# Patient Record
Sex: FEMALE | Race: BLACK OR AFRICAN AMERICAN | NOT HISPANIC OR LATINO | Employment: FULL TIME | URBAN - METROPOLITAN AREA
[De-identification: names, ages, dates, MRNs, and addresses within clinical notes are randomized per-mention and may not be internally consistent; named-entity substitution may affect disease eponyms.]

---

## 2023-02-23 ENCOUNTER — APPOINTMENT (OUTPATIENT)
Dept: CARDIOLOGY | Facility: CLINIC | Age: 28
DRG: 259 | End: 2023-02-23
Attending: EMERGENCY MEDICINE
Payer: COMMERCIAL

## 2023-02-23 ENCOUNTER — TELEPHONE (OUTPATIENT)
Dept: CARDIOLOGY | Facility: CLINIC | Age: 28
End: 2023-02-23
Payer: COMMERCIAL

## 2023-02-23 ENCOUNTER — HOSPITAL ENCOUNTER (INPATIENT)
Facility: CLINIC | Age: 28
LOS: 7 days | Discharge: HOME | DRG: 259 | End: 2023-03-02
Attending: EMERGENCY MEDICINE | Admitting: HOSPITALIST
Payer: COMMERCIAL

## 2023-02-23 ENCOUNTER — APPOINTMENT (OUTPATIENT)
Dept: GENERAL RADIOLOGY | Facility: CLINIC | Age: 28
DRG: 259 | End: 2023-02-23
Attending: EMERGENCY MEDICINE
Payer: COMMERCIAL

## 2023-02-23 DIAGNOSIS — Z95.0 PACEMAKER: Primary | ICD-10-CM

## 2023-02-23 DIAGNOSIS — D50.8 OTHER IRON DEFICIENCY ANEMIA: ICD-10-CM

## 2023-02-23 DIAGNOSIS — I44.2 COMPLETE HEART BLOCK (H): ICD-10-CM

## 2023-02-23 DIAGNOSIS — I48.92 ATRIAL FLUTTER, UNSPECIFIED TYPE (H): ICD-10-CM

## 2023-02-23 DIAGNOSIS — Z95.2 HISTORY OF TRICUSPID VALVE REPLACEMENT WITH MECHANICAL VALVE: Primary | ICD-10-CM

## 2023-02-23 DIAGNOSIS — Z95.0 CARDIAC PACEMAKER IN SITU: ICD-10-CM

## 2023-02-23 DIAGNOSIS — I51.3 MURAL THROMBUS OF HEART: ICD-10-CM

## 2023-02-23 LAB
ANION GAP SERPL CALCULATED.3IONS-SCNC: 12 MMOL/L (ref 7–15)
ATRIAL RATE - MUSE: 234 BPM
ATRIAL RATE - MUSE: 241 BPM
BASOPHILS # BLD AUTO: 0 10E3/UL (ref 0–0.2)
BASOPHILS NFR BLD AUTO: 0 %
BUN SERPL-MCNC: 14.2 MG/DL (ref 6–20)
CALCIUM SERPL-MCNC: 9.4 MG/DL (ref 8.6–10)
CHLORIDE SERPL-SCNC: 103 MMOL/L (ref 98–107)
CREAT SERPL-MCNC: 0.65 MG/DL (ref 0.51–0.95)
DEPRECATED HCO3 PLAS-SCNC: 23 MMOL/L (ref 22–29)
DIASTOLIC BLOOD PRESSURE - MUSE: NORMAL MMHG
DIASTOLIC BLOOD PRESSURE - MUSE: NORMAL MMHG
EOSINOPHIL # BLD AUTO: 0 10E3/UL (ref 0–0.7)
EOSINOPHIL NFR BLD AUTO: 1 %
ERYTHROCYTE [DISTWIDTH] IN BLOOD BY AUTOMATED COUNT: 17.6 % (ref 10–15)
ERYTHROCYTE [DISTWIDTH] IN BLOOD BY AUTOMATED COUNT: 17.7 % (ref 10–15)
GFR SERPL CREATININE-BSD FRML MDRD: >90 ML/MIN/1.73M2
GLUCOSE SERPL-MCNC: 82 MG/DL (ref 70–99)
HCT VFR BLD AUTO: 30 % (ref 35–47)
HCT VFR BLD AUTO: 34.3 % (ref 35–47)
HGB BLD-MCNC: 8.9 G/DL (ref 11.7–15.7)
HGB BLD-MCNC: 9.7 G/DL (ref 11.7–15.7)
HOLD SPECIMEN: NORMAL
IMM GRANULOCYTES # BLD: 0 10E3/UL
IMM GRANULOCYTES NFR BLD: 0 %
INR PPP: 2.17 (ref 0.85–1.15)
INTERPRETATION ECG - MUSE: NORMAL
INTERPRETATION ECG - MUSE: NORMAL
LVEF ECHO: NORMAL
LYMPHOCYTES # BLD AUTO: 1 10E3/UL (ref 0.8–5.3)
LYMPHOCYTES NFR BLD AUTO: 31 %
MCH RBC QN AUTO: 21.9 PG (ref 26.5–33)
MCH RBC QN AUTO: 22.5 PG (ref 26.5–33)
MCHC RBC AUTO-ENTMCNC: 28.3 G/DL (ref 31.5–36.5)
MCHC RBC AUTO-ENTMCNC: 29.7 G/DL (ref 31.5–36.5)
MCV RBC AUTO: 76 FL (ref 78–100)
MCV RBC AUTO: 77 FL (ref 78–100)
MONOCYTES # BLD AUTO: 0.2 10E3/UL (ref 0–1.3)
MONOCYTES NFR BLD AUTO: 5 %
NEUTROPHILS # BLD AUTO: 2.1 10E3/UL (ref 1.6–8.3)
NEUTROPHILS NFR BLD AUTO: 63 %
NRBC # BLD AUTO: 0 10E3/UL
NRBC BLD AUTO-RTO: 0 /100
NT-PROBNP SERPL-MCNC: 127 PG/ML (ref 0–450)
P AXIS - MUSE: -77 DEGREES
P AXIS - MUSE: -84 DEGREES
PLATELET # BLD AUTO: 179 10E3/UL (ref 150–450)
PLATELET # BLD AUTO: 231 10E3/UL (ref 150–450)
POTASSIUM SERPL-SCNC: 4 MMOL/L (ref 3.4–5.3)
PR INTERVAL - MUSE: 100 MS
PR INTERVAL - MUSE: NORMAL MS
QRS DURATION - MUSE: 4 MS
QRS DURATION - MUSE: 4 MS
QT - MUSE: 140 MS
QT - MUSE: 176 MS
QTC - MUSE: 238 MS
QTC - MUSE: 292 MS
R AXIS - MUSE: 0 DEGREES
R AXIS - MUSE: 0 DEGREES
RBC # BLD AUTO: 3.96 10E6/UL (ref 3.8–5.2)
RBC # BLD AUTO: 4.43 10E6/UL (ref 3.8–5.2)
SODIUM SERPL-SCNC: 138 MMOL/L (ref 136–145)
SYSTOLIC BLOOD PRESSURE - MUSE: NORMAL MMHG
SYSTOLIC BLOOD PRESSURE - MUSE: NORMAL MMHG
T AXIS - MUSE: 31 DEGREES
T AXIS - MUSE: 74 DEGREES
TROPONIN T SERPL HS-MCNC: <6 NG/L
VENTRICULAR RATE- MUSE: 166 BPM
VENTRICULAR RATE- MUSE: 174 BPM
WBC # BLD AUTO: 3.3 10E3/UL (ref 4–11)
WBC # BLD AUTO: 5.2 10E3/UL (ref 4–11)

## 2023-02-23 PROCEDURE — 85025 COMPLETE CBC W/AUTO DIFF WBC: CPT | Performed by: EMERGENCY MEDICINE

## 2023-02-23 PROCEDURE — 71046 X-RAY EXAM CHEST 2 VIEWS: CPT

## 2023-02-23 PROCEDURE — 250N000011 HC RX IP 250 OP 636: Performed by: EMERGENCY MEDICINE

## 2023-02-23 PROCEDURE — 210N000002 HC R&B HEART CARE

## 2023-02-23 PROCEDURE — 36415 COLL VENOUS BLD VENIPUNCTURE: CPT | Performed by: EMERGENCY MEDICINE

## 2023-02-23 PROCEDURE — 85610 PROTHROMBIN TIME: CPT | Performed by: EMERGENCY MEDICINE

## 2023-02-23 PROCEDURE — 99223 1ST HOSP IP/OBS HIGH 75: CPT | Mod: AI | Performed by: HOSPITALIST

## 2023-02-23 PROCEDURE — 250N000011 HC RX IP 250 OP 636: Performed by: HOSPITALIST

## 2023-02-23 PROCEDURE — 93005 ELECTROCARDIOGRAM TRACING: CPT

## 2023-02-23 PROCEDURE — 93306 TTE W/DOPPLER COMPLETE: CPT | Mod: 26 | Performed by: INTERNAL MEDICINE

## 2023-02-23 PROCEDURE — 83880 ASSAY OF NATRIURETIC PEPTIDE: CPT | Performed by: EMERGENCY MEDICINE

## 2023-02-23 PROCEDURE — 36415 COLL VENOUS BLD VENIPUNCTURE: CPT | Performed by: HOSPITALIST

## 2023-02-23 PROCEDURE — 84484 ASSAY OF TROPONIN QUANT: CPT | Performed by: EMERGENCY MEDICINE

## 2023-02-23 PROCEDURE — 255N000002 HC RX 255 OP 636: Performed by: EMERGENCY MEDICINE

## 2023-02-23 PROCEDURE — 999N000208 ECHOCARDIOGRAM COMPLETE

## 2023-02-23 PROCEDURE — 80048 BASIC METABOLIC PNL TOTAL CA: CPT | Performed by: EMERGENCY MEDICINE

## 2023-02-23 PROCEDURE — 85027 COMPLETE CBC AUTOMATED: CPT | Performed by: HOSPITALIST

## 2023-02-23 PROCEDURE — 99285 EMERGENCY DEPT VISIT HI MDM: CPT | Mod: 25

## 2023-02-23 RX ORDER — HEPARIN SODIUM 10000 [USP'U]/100ML
0-5000 INJECTION, SOLUTION INTRAVENOUS CONTINUOUS
Status: DISCONTINUED | OUTPATIENT
Start: 2023-02-23 | End: 2023-02-24

## 2023-02-23 RX ORDER — HEPARIN SODIUM 10000 [USP'U]/100ML
0-5000 INJECTION, SOLUTION INTRAVENOUS CONTINUOUS
Status: DISCONTINUED | OUTPATIENT
Start: 2023-02-23 | End: 2023-02-23

## 2023-02-23 RX ORDER — ACETAMINOPHEN 325 MG/1
650 TABLET ORAL DAILY PRN
Status: ON HOLD | COMMUNITY
End: 2023-03-02

## 2023-02-23 RX ORDER — WARFARIN SODIUM 10 MG/1
TABLET ORAL
COMMUNITY
End: 2023-06-07

## 2023-02-23 RX ADMIN — HEPARIN SODIUM 900 UNITS/HR: 10000 INJECTION, SOLUTION INTRAVENOUS at 18:14

## 2023-02-23 RX ADMIN — HEPARIN SODIUM 1350 UNITS/HR: 10000 INJECTION, SOLUTION INTRAVENOUS at 19:45

## 2023-02-23 RX ADMIN — HUMAN ALBUMIN MICROSPHERES AND PERFLUTREN 9 ML: 10; .22 INJECTION, SOLUTION INTRAVENOUS at 16:34

## 2023-02-23 ASSESSMENT — ENCOUNTER SYMPTOMS
SHORTNESS OF BREATH: 1
COUGH: 0
DIZZINESS: 1
LIGHT-HEADEDNESS: 1
FEVER: 0
FATIGUE: 1

## 2023-02-23 ASSESSMENT — ACTIVITIES OF DAILY LIVING (ADL)
ADLS_ACUITY_SCORE: 35
ADLS_ACUITY_SCORE: 33
ADLS_ACUITY_SCORE: 35

## 2023-02-23 NOTE — TELEPHONE ENCOUNTER
Was going to attempt to call patient again but appears as though she is currently in the ED. Will check back later.  NELI MITTAL

## 2023-02-23 NOTE — ED PROVIDER NOTES
History     Chief Complaint:  Chest Pain       HPI   Silvia Otero is a 27 year old female with a history of a tricuspid valve replacement and third degree AV block with a pacemaker on Coumadin who presents with chest pain. The patient reports that she had her pacemaker placed in [place] in 2016 and was told that she would need the battery replaced this year. About a week ago the patient started to develop stabbing chest pain, shortness of breath, dizziness, lightheadedness, and fatigue. She has been in communication with cardiology and is scheduled for an appointment with electrophysiology tomorrow. Today her symptoms worsened, prompting her to come into the ED for evaluation. Upon evaluation in the ED she reports that her pain has improved. She denies any recent fever, cough, or leg swelling. She has been taking her Coumadin as prescribed.  No numbness tingling or weakness.      Independent Historian:   None - Patient Only    Review of External Notes: I reviewed the paperwork that the patient has on her phone showing that she has a artificial tricuspid valve with a pacemaker due to third-degree heart block.    ROS:  Review of Systems   Constitutional: Positive for fatigue. Negative for fever.   Respiratory: Positive for shortness of breath. Negative for cough.    Cardiovascular: Positive for chest pain. Negative for leg swelling.   Neurological: Positive for dizziness and light-headedness.   All other systems reviewed and are negative.      Allergies:  No known drug allergies      Medications:    Coumadin     Past Medical History:    CAD   Third degree AV block   Tricuspid valve tumor     Past Surgical History:    Pacemaker placement   Tricuspid valve tumor resection   Tricuspid valve replacement      Family History:    Diabetes - Father     Social History:  The patient presents to the ED alone.   The patient moved to [place] from [place] in June 2022.     Physical Exam     Patient Vitals for the  "past 24 hrs:   BP Temp Temp src Pulse Resp SpO2 Height Weight   02/23/23 1419 126/72 97.5  F (36.4  C) Temporal 63 20 99 % 1.753 m (5' 9\") 74.4 kg (164 lb)        Physical Exam  Nursing note and vitals reviewed.  Constitutional:  Appears well-developed and well-nourished.   HENT:   Head:    Atraumatic.   Mouth/Throat:   Oropharynx is clear and moist. No oropharyngeal exudate.   Eyes:    Pupils are equal, round, and reactive to light.   Neck:    Normal range of motion. Neck supple.      No tracheal deviation present. No thyromegaly present.   Cardiovascular:  Normal rate, regular rhythm, no murmur   Pulmonary/Chest: Breath sounds are clear and equal without wheezes or crackles.  Abdominal:   Soft. Bowel sounds are normal. Exhibits no distension and      no mass. There is no tenderness.      There is no rebound and no guarding.   Musculoskeletal:  Exhibits no edema.   Lymphadenopathy:  No cervical adenopathy.   Neurological:   Alert and oriented to person, place, and time.GCS 15.  CN 2-12 intact.  and proximal upper extremity strength strong and equal.  Bilateral lower extremity strength strong and equal, including strong dorsiflexion and plantarflexion strength.  Sensation intact and equal to the face, arms and legs.  No facial droop or weakness. Normal speech.  Follows commands and answers questions normally.     Skin:    Skin is warm and dry. No rash noted. No pallor.      Emergency Department Course   ECG  ECG taken at 14:33:51, ECG read at 1509  Atrial flutter, Undetermined rhythm, Indeterminate axis, Pulmonary disease pattern, Nonspecific T wave abnormality, Critical test result: high heart rate, Abnormal ECG   Rate 166 bpm. CA interval * ms. QRS duration 4 ms. QT/QTc 176/292 ms. P-R-T axes -77 0 31.     ECG taken at 18:04 is unchanged from prior     Imaging:  Echocardiogram Complete   Final Result      XR Chest 2 Views   Final Result   IMPRESSION: Heart size is normal. Mechanical heart valve, median "   sternotomy wires, and implantable cardiac device in the anterior soft   tissues of the upper abdomen are noted. No pleural effusion,   pneumothorax, or abnormal area of consolidation. Pulmonary vascularity   is normal in size.      RADHA MONTGOMERY MD            SYSTEM ID:  [id number]      Transesophageal Echocardiogram    (Results Pending)   Report per radiology    Laboratory:  Labs Ordered and Resulted from Time of ED Arrival to Time of ED Departure   INR - Abnormal       Result Value    INR 2.17 (*)    CBC WITH PLATELETS AND DIFFERENTIAL - Abnormal    WBC Count 3.3 (*)     RBC Count 3.96      Hemoglobin 8.9 (*)     Hematocrit 30.0 (*)     MCV 76 (*)     MCH 22.5 (*)     MCHC 29.7 (*)     RDW 17.7 (*)     Platelet Count 179      % Neutrophils 63      % Lymphocytes 31      % Monocytes 5      % Eosinophils 1      % Basophils 0      % Immature Granulocytes 0      NRBCs per 100 WBC 0      Absolute Neutrophils 2.1      Absolute Lymphocytes 1.0      Absolute Monocytes 0.2      Absolute Eosinophils 0.0      Absolute Basophils 0.0      Absolute Immature Granulocytes 0.0      Absolute NRBCs 0.0     BASIC METABOLIC PANEL - Normal    Sodium 138      Potassium 4.0      Chloride 103      Carbon Dioxide (CO2) 23      Anion Gap 12      Urea Nitrogen 14.2      Creatinine 0.65      Calcium 9.4      Glucose 82      GFR Estimate >90     TROPONIN T, HIGH SENSITIVITY - Normal    Troponin T, High Sensitivity <6     NT PROBNP INPATIENT - Normal    N terminal Pro BNP Inpatient 127          Emergency Department Course & Assessments:     Interventions:  Medications   heparin loading dose for LOW INTENSITY TREATMENT * Give BEFORE starting heparin infusion (has no administration in time range)   heparin 25,000 units in 0.45% NaCl 250 mL ANTICOAGULANT infusion (has no administration in time range)   perflutren diluted 1mL to 2mL with saline (OPTISON) diluted injection 9 mL (9 mLs Intravenous $Given 2/23/23 1628)   sodium chloride (PF) 0.9% PF  flush 10 mL (10 mLs Intravenous $Given 2/23/23 7238)      Consultations/Discussion of Management or Tests:  1541: I spoke with Dr. Koch of the electrophysiology service regarding patient's presentation, findings, and plan of care.      1711: I spoke with Dr. Quiroga of the cardiology service regarding patient's presentation, findings, and plan of care.     1742: I spoke with Dr. Bess of the hospitalist service regarding patient's presentation, findings, and plan of care.     18:20 [employer] rep Jade called and stated that she did not get any of the interrogation info, so the tech was asked to reinterrogate the pacemaker.     Social Determinants of Health affecting care:   Healthcare Access/Compliance    Assessments:  1524: The patient was seen and evaluated.     1723: I updated and reassessed the patient.     Disposition:  The patient was admitted to the hospital under the care of Dr. Bess.     Impression & Plan      Medical Decision Making:  This patient arrived with concern for possibl pacemaker battery malfunction since she is aware that the battery on her pacemaker is due to be changed.  Her symptoms have been lightheadedness, intermittent chest pains and shortness of breath for the past week.  I found her to be in atrial flutter without pacemaker spikes on her EKG so I requested that her pacemaker be interrogated.  The tech interrogated her [employer] pacemaker but I did not hear back from the rep in spite of multiple phone calls.  I consulted electrophysiology cardiology and ordered a stat bedside echo.  Dr. Damon Quiroga the cardiologist called stating that he was concerned about intracardiac thrombus around her mechanical valve and since her INR is slightly subtherapeutic she is placed on heparin bolus and drip and admitted to the hospital with plans to have a HANK in the morning.  She will need to be kept n.p.o. after midnight.  There was no sign of acute myocardial infarction or heart failure at this time and I  feel that pulmonary embolism is very unlikely.  There is no sign of stroke.  There is no sign of pericardial effusion or tamponade on echo.  She is admitted to the care of the hospitalist to the cardiac telemetry unit.    Diagnosis:    ICD-10-CM    1. Mural thrombus of heart  I51.3       2. Atrial flutter, unspecified type (H)  I48.92               Scribe Disclosure:  I, Damon Cisneros, am serving as a scribe at 3:12 PM on 2/23/2023 to document services personally performed by Allison Tanner MD based on my observations and the provider's statements to me.   2/23/2023   Allison Tanner MD [place], Cheri Lee, MD  02/23/23 1812       Allison Tanner MD  02/23/23 1821

## 2023-02-23 NOTE — ED TRIAGE NOTES
Pt states she has chest heaviness that comes and goes. Pacemaker placed 2016 and needs change. PT. Also states she has dizziness at times.      Triage Assessment     Row Name 02/23/23 4426       Triage Assessment (Adult)    Airway WDL WDL       Respiratory WDL    Respiratory WDL WDL       Skin Circulation/Temperature WDL    Skin Circulation/Temperature WDL WDL       Cardiac WDL    Cardiac WDL X  pacemaker placed left side lower chest per pt       Peripheral/Neurovascular WDL    Peripheral Neurovascular WDL WDL       Cognitive/Neuro/Behavioral WDL    Cognitive/Neuro/Behavioral WDL WDL

## 2023-02-23 NOTE — TELEPHONE ENCOUNTER
This encounter was rerouted to device pool by scheduling. I am assuming it is because they were unable to get an in clinic appt slot scheduled close to EP appt at 1245 tomorrow.   There was an appt slot available at 1100 on the DCR2 schedule so I went ahead and scheduled patient for that slot. Called patient, no answer. LVM requesting a call back to device RN line to be sure this new in clinic device check time works for her. Left her on the schedule for both scheduled times for now and will cancel whichever isn't necessary once she calls back.    NELI RN

## 2023-02-23 NOTE — TELEPHONE ENCOUNTER
----- Message from Noah Mcintyre CMA sent at 2/23/2023  8:06 AM CST -----  Regarding: Hello, can you please place an order for device check in clinic, New Pt Thank you!   [facility]     Phone Message     May a detailed message be left on voicemail: yes      Reason for Call: Other: Patient is new to cardiology and has a pacemaker that was placed in [place]. She states she needs her battery changed on the device. Patient has some of her medica records with her personally. Please review and contact patient to schedule accordingly.       Action Taken: Other: cardiology     Travel Screening: Not Applicable   Thank you!  [facility]

## 2023-02-23 NOTE — Clinical Note
Stat order placed for HCG to be run on blood available in lab  will await result before any fluro is delivered

## 2023-02-23 NOTE — TELEPHONE ENCOUNTER
1400 UPDATE: per [employer], unable to retrieve prior records. Most likely able to obtain transmission from in clinic monitor however, a code will be needed from tech services to access the device.     Spoke with pt. Denies symptoms. Informed her that scheduling will be calling to coordinate an in clinic visit to establish with cardiology and for an in clinic device check, soonest available. Reviewed the reasoning behind establishing care.  Orders entered for both and routed to scheduling.     Pt reports she has a [employer] device. Direct number given for device RN if any symptoms, questions or conerns. DAXA Thomas

## 2023-02-23 NOTE — H&P
[facility]    History and Physical - Hospitalist Service       Date of Admission:  2/23/2023    Assessment & Plan      Silvia Otero is a 27 year old female admitted on 2/23/2023 after she presented with chest pain, found to have a possible intracardiac thrombus around her mechanical valve.    Third-degree AV block status post pacemaker placement  *Patient had pacemaker placed and [place] in 2016 and was informed she was to have the battery changed this year.  *She presents with symptoms of chest pain, shortness of breath and lightheadedness for the past 1 week and is concerned for pacemaker malfunction.  *EKG showing sawtooth waves with heart rate in the 170s and no pacer spikes.  Plan  --[employer] pacer tech consulted for pacemaker interrogation and plans to do it in person tonight  --Continue to monitor on telemetry.  --Cardiology already consulted; EP consult pending pacemaker interrogation results      Possible Mechanical tricuspid valve thrombus  * Stat echo obtained in the ED revealed possible mechanical valve thrombus  * Cardiologist who read the echo recommended a HANK in the a.m.  Order ready placed  Plan  -- N.p.o. after midnight for HANK in a.m.  -- Heparin drip      Subtherapeutic INR  *INR 2.1 on admission  *Goal INR 2.5-3.0  Plan  --Started on heparin drip  -- We will hold off on resumption of warfarin tonight due to possible HANK to be done in the morning.  Plan to resume postprocedure if no further procedures to be done    Atrial flutter  --Seen on EKG  --Rates currently controlled  --Cardiology consult  --Heparin drip, transition to PTA Coumadin after HANK.    Anemia  * Hgb 8.9 on admission  * baseline unclear as patient recently migrated from [place], however patient is on anticoagulation. No black stools or gross bleeding noted.  Plan  -- monitor CBC, repeat in am       Diet:  regular   DVT Prophylaxis: heparin gtt  Iglesias Catheter: Not present  Lines: None      Cardiac Monitoring: None  Code Status:  Full code    Clinically Significant Risk Factors Present on Admission               # Drug Induced Coagulation Defect: home medication list includes an anticoagulant medication                 Disposition Plan      Expected Discharge Date: 02/25/2023                  Rylie Bess MD  Hospitalist Service  [facility]  Securely message with [employer] (more info)  Text page via [facility] Paging/Directory     ______________________________________________________________________    Chief Complaint   Lightheadedness  Chest pain a  SOB    History is obtained from the patient    History of Present Illness   Silvia Otero is a 27 year old female who  Presents with above symptoms for the past 1 week.    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    She presents today with complaints of lightheadedness, intermittent chest heaviness and shortness of breath at the part of the past week.    Of note, she recently migrated from [place]  last year and is yet to establish care here in the . She has a hx of  A tricuspid tumor that was operated on complicated by damamanda to the tricuspid valve requiring a replacement in 2001 and revision in 2016. Also at that time, her course was complicated by a third degree heart block requiring a pace maker. She notes she was told she would need to change the batteries to her pace maker this year. She comes in today concerned about a malfunction of her pacemaker.     In the ED, vital signs were stable.  Labs revealed unremarkable BMP, proBNP within normal limits, troponin negative, WBC 3.7, hemoglobin 8.9, INR 2.1, chest x-ray unremarkable.  EKG with sawtooth waves seen, HR in the 170s. Stat echo was obtained in the ER and was significant for mechanical tricuspid valve with possible thrombus.      Past Medical History    No past medical history on file.    Past Surgical  History   No past surgical history on file.    Prior to Admission Medications   Prior to Admission Medications   Prescriptions Last Dose Informant Patient Reported? Taking?   warfarin ANTICOAGULANT (COUMADIN) 10 MG tablet   Yes Yes   Sig: Take by mouth See Admin Instructions 10 mg on Mondays thru Thursdays, then 15 mg Fridays thru Sundays.      Facility-Administered Medications: None        Review of Systems    The 10 point Review of Systems is negative other than noted in the HPI or here.      Physical Exam   Vital Signs: Temp: 97.5  F (36.4  C) Temp src: Temporal BP: 126/72 Pulse: 63   Resp: 20 SpO2: 99 % O2 Device: None (Room air)    Weight: 164 lbs 0 oz    General Appearance: Well appearing for stated age.  Respiratory: CTAB, no rales or ronchi  Cardiovascular: S1, S2 normal, no murmurs  GI: non-tender on palpation, BS present      Medical Decision Making       55 MINUTES SPENT BY ME on the date of service doing chart review, history, exam, documentation & further activities per the note.      Data     I have personally reviewed the following data over the past 24 hrs:    3.3 (L)  \   8.9 (L)   / 179     138 103 14.2 /  82   4.0 23 0.65 \       Trop: <6 BNP: 127       INR:  2.17 (H) PTT:  N/A   D-dimer:  N/A Fibrinogen:  N/A       Imaging results reviewed over the past 24 hrs:   Recent Results (from the past 24 hour(s))   XR Chest 2 Views    Narrative    CHEST TWO VIEWS  2/23/2023 3:17 PM     HISTORY: 27-year-old woman with shortness of breath and complete heart  block history, evaluate for CHF.    COMPARISON: None.       Impression    IMPRESSION: Heart size is normal. Mechanical heart valve, median  sternotomy wires, and implantable cardiac device in the anterior soft  tissues of the upper abdomen are noted. No pleural effusion,  pneumothorax, or abnormal area of consolidation. Pulmonary vascularity  is normal in size.    ARDHA MONTGOMERY MD         SYSTEM ID:  [id number]   Echocardiogram Complete   Result Value     LVEF  60-65%    [facility]    [id number]  [id number]  [id number]  [id number]^PATT^ANTHONY^SYED                                                                       Version 2     [facility]  Echocardiography Laboratory  [address] [place] [address]     Name: EDDY AKBAR  MRN: [id number]  : 1995  Study Date: 2023 04:10 PM  Age: 27 yrs  Gender: Female  Patient Location: [facility]  Reason For Study: Abn EKG  Ordering Physician: ANTHONY TANNER  Performed By: Shawna Pang     BSA: 1.9 m2  Height: 69 in  Weight: 164 lb  HR: 63  BP: 116/62 mmHg  ______________________________________________________________________________  Procedure  Complete Portable Echo Adult. Optison (NDC #[id number]) given intravenously.  ______________________________________________________________________________  Interpretation Summary     The left ventricle is normal in size.  The visual ejection fraction is 60-65%.  Diastolic Doppler findings (E/E' ratio and/or other parameters) suggest left  ventricular filling pressures are increased.  No regional wall motion abnormalities noted.  The right ventricle is moderately dilated.  Moderately decreased right ventricular systolic function  There is a mechanical Tricuspid valve with elevated mean gradient 15mmHg and  possible thrombus but cannot adequately assess with this modality due to  shadowing from the mechanical valve.     Patient was also in rapid atrial flutter initially.     Recommend HANK to better assess tricuspid valve and supplement IV heparin for  subtherapeutic INR. Discussed with Dr. Tanner at 5:15pm.  ______________________________________________________________________________  Left Ventricle  The left ventricle is normal in size. There is normal left ventricular wall  thickness. The visual ejection fraction is 60-65%. Diastolic Doppler findings  (E/E' ratio and/or other parameters) suggest left ventricular filling  pressures are increased.  No regional wall motion abnormalities noted.     Right Ventricle  The right ventricle is moderately dilated. Moderately decreased right  ventricular systolic function.     Atria  Normal left atrial size. The right atrium is moderately dilated. There is no  color Doppler evidence of an atrial shunt.     Mitral Valve  The mitral valve is normal in structure and function. There is trace mitral  regurgitation.     Tricuspid Valve  There is trace tricuspid regurgitation. Right ventricular systolic pressure  could not be approximated due to inadequate tricuspid regurgitation. IVC  diameter >2.1 cm collapsing <50% with sniff suggests a high RA pressure  estimated at 15 mmHg or greater. The Tricuspid valve mean diastolic gradient  is 15.4 mmHg mmHg. There is a mechanical Tricuspid valve.     Aortic Valve  Normal tricuspid aortic valve. No aortic regurgitation is present.     Pulmonic Valve  There is no pulmonic valvular regurgitation.     Vessels  Normal size aorta. The aortic root is normal size.     Pericardium  There is no pericardial effusion.     Rhythm  The rhythm was atrial flutter.  ______________________________________________________________________________  MMode/2D Measurements & Calculations     IVSd: 0.77 cm  LVIDd: 5.4 cm  LVIDs: 3.1 cm  LVPWd: 0.90 cm  FS: 41.5 %  LV mass(C)d: 161.8 grams  LV mass(C)dI: 85.2 grams/m2  Ao root diam: 2.7 cm  LA dimension: 3.8 cm  asc Aorta Diam: 2.7 cm  LA/Ao: 1.4  RWT: 0.34     Doppler Measurements & Calculations  MV E max kal: 89.2 cm/sec  MV A max kal: 52.9 cm/sec  MV E/A: 1.7  MV dec slope: 460.0 cm/sec2  MV dec time: 0.20 sec  TV V2 max: 248.4 cm/sec  TV max P.7 mmHg  TV mean PG: 15.4 mmHg  TV V2 VTI: 136.5 cm  PA acc time: 0.11 sec  E/E' av.5  Lateral E/e': 5.4     Medial E/e': 19.6     ______________________________________________________________________________  Report approved by: Brandon Dubon 2023 05:26 PM

## 2023-02-23 NOTE — ED NOTES
Rapid Assessment Note    History:   Silvia Otero is a 27 year old female on Warfarin with history of [employer] pacemaker implantation following tricuspid valve replacement and 3rd degree AV block who presents with malaise and needing a replacement pacemaker battery. She reports some shortness of breath, chest heaviness, stabbing chest pain, dizziness, lightheadedness, and fatigue. She explains that her pacemaker was placed in 2016 in [place] and was advised that she would need a replacement battery this year when she moved to the . Of note, she is not followed by cardiology presently.     Exam:   General:  Alert, interactive  Cardiovascular:  Well perfused  Lungs:  No respiratory distress, no accessory muscle use  Neuro:  Moving all 4 extremities  Skin:  Warm, dry  Psych:  Normal affect      Plan of Care:   I evaluated the patient and developed an initial plan of care. I discussed this plan and explained that I, or one of my partners, would be returning to complete the evaluation.     Patient presents with history of valve replacement and complete heart block requiring pacemaker and was told that she needed to have the battery changed in the coming year.  She has not been able to establish cardiology yet.  Orders are placed she is hemodynamically stable.      I, Pineda Hagen, am serving as a scribe to document services personally performed by Jeffery Swartz MD, based on my observations and the provider's statements to me.    2/23/2023  [employer]    Portions of this medical record were completed by a scribe. UPON MY REVIEW AND AUTHENTICATION BY ELECTRONIC SIGNATURE, this confirms (a) I performed the applicable clinical services, and (b) the record is accurate.      Jeffery Swartz MD  02/23/23 7329

## 2023-02-24 ENCOUNTER — APPOINTMENT (OUTPATIENT)
Dept: CARDIOLOGY | Facility: CLINIC | Age: 28
DRG: 259 | End: 2023-02-24
Attending: INTERNAL MEDICINE
Payer: COMMERCIAL

## 2023-02-24 LAB
ALBUMIN SERPL BCG-MCNC: 4.6 G/DL (ref 3.5–5.2)
ALP SERPL-CCNC: 72 U/L (ref 35–104)
ALT SERPL W P-5'-P-CCNC: 14 U/L (ref 10–35)
AST SERPL W P-5'-P-CCNC: 27 U/L (ref 10–35)
BILIRUB DIRECT SERPL-MCNC: <0.2 MG/DL (ref 0–0.3)
BILIRUB SERPL-MCNC: 0.5 MG/DL
CK SERPL-CCNC: 111 U/L (ref 26–192)
HCG SERPL QL: NEGATIVE
INR PPP: 2.11 (ref 0.85–1.15)
IRON BINDING CAPACITY (ROCHE): 417 UG/DL (ref 240–430)
IRON SATN MFR SERPL: 5 % (ref 15–46)
IRON SERPL-MCNC: 22 UG/DL (ref 37–145)
LVEF ECHO: NORMAL
MAGNESIUM SERPL-MCNC: 2 MG/DL (ref 1.7–2.3)
PROT SERPL-MCNC: 7.1 G/DL (ref 6.4–8.3)
TROPONIN T SERPL HS-MCNC: <6 NG/L
TSH SERPL DL<=0.005 MIU/L-ACNC: 2.39 UIU/ML (ref 0.3–4.2)
UFH PPP CHRO-ACNC: 0.53 IU/ML
UFH PPP CHRO-ACNC: 0.86 IU/ML

## 2023-02-24 PROCEDURE — 250N000011 HC RX IP 250 OP 636: Performed by: INTERNAL MEDICINE

## 2023-02-24 PROCEDURE — 33227 REMOVE&REPLACE PM GEN SINGL: CPT | Performed by: INTERNAL MEDICINE

## 2023-02-24 PROCEDURE — 999N000184 HC STATISTIC TELEMETRY

## 2023-02-24 PROCEDURE — 210N000002 HC R&B HEART CARE

## 2023-02-24 PROCEDURE — 76000 FLUOROSCOPY <1 HR PHYS/QHP: CPT | Performed by: INTERNAL MEDICINE

## 2023-02-24 PROCEDURE — 93325 DOPPLER ECHO COLOR FLOW MAPG: CPT | Mod: 26 | Performed by: INTERNAL MEDICINE

## 2023-02-24 PROCEDURE — 84703 CHORIONIC GONADOTROPIN ASSAY: CPT | Performed by: HOSPITALIST

## 2023-02-24 PROCEDURE — 0JH604Z INSERTION OF PACEMAKER, SINGLE CHAMBER INTO CHEST SUBCUTANEOUS TISSUE AND FASCIA, OPEN APPROACH: ICD-10-PCS | Performed by: INTERNAL MEDICINE

## 2023-02-24 PROCEDURE — 85520 HEPARIN ASSAY: CPT | Performed by: HOSPITALIST

## 2023-02-24 PROCEDURE — 0JPT0PZ REMOVAL OF CARDIAC RHYTHM RELATED DEVICE FROM TRUNK SUBCUTANEOUS TISSUE AND FASCIA, OPEN APPROACH: ICD-10-PCS | Performed by: INTERNAL MEDICINE

## 2023-02-24 PROCEDURE — 250N000011 HC RX IP 250 OP 636: Performed by: HOSPITALIST

## 2023-02-24 PROCEDURE — C1786 PMKR, SINGLE, RATE-RESP: HCPCS | Performed by: INTERNAL MEDICINE

## 2023-02-24 PROCEDURE — 36415 COLL VENOUS BLD VENIPUNCTURE: CPT | Performed by: HOSPITALIST

## 2023-02-24 PROCEDURE — 99222 1ST HOSP IP/OBS MODERATE 55: CPT | Mod: 57 | Performed by: INTERNAL MEDICINE

## 2023-02-24 PROCEDURE — 99232 SBSQ HOSP IP/OBS MODERATE 35: CPT | Performed by: HOSPITALIST

## 2023-02-24 PROCEDURE — 93320 DOPPLER ECHO COMPLETE: CPT | Mod: 26 | Performed by: INTERNAL MEDICINE

## 2023-02-24 PROCEDURE — 272N000001 HC OR GENERAL SUPPLY STERILE: Performed by: INTERNAL MEDICINE

## 2023-02-24 PROCEDURE — 258N000003 HC RX IP 258 OP 636: Performed by: INTERNAL MEDICINE

## 2023-02-24 PROCEDURE — 250N000009 HC RX 250: Performed by: INTERNAL MEDICINE

## 2023-02-24 PROCEDURE — 80076 HEPATIC FUNCTION PANEL: CPT | Performed by: HOSPITALIST

## 2023-02-24 PROCEDURE — 99152 MOD SED SAME PHYS/QHP 5/>YRS: CPT | Performed by: INTERNAL MEDICINE

## 2023-02-24 PROCEDURE — 83550 IRON BINDING TEST: CPT | Performed by: HOSPITALIST

## 2023-02-24 PROCEDURE — 999N000183 HC STATISTIC TEE INCLUDES SEDATION

## 2023-02-24 PROCEDURE — 82550 ASSAY OF CK (CPK): CPT | Performed by: HOSPITALIST

## 2023-02-24 PROCEDURE — 93312 ECHO TRANSESOPHAGEAL: CPT | Mod: 26 | Performed by: INTERNAL MEDICINE

## 2023-02-24 PROCEDURE — 83735 ASSAY OF MAGNESIUM: CPT | Performed by: HOSPITALIST

## 2023-02-24 PROCEDURE — 85610 PROTHROMBIN TIME: CPT | Performed by: HOSPITALIST

## 2023-02-24 PROCEDURE — 84484 ASSAY OF TROPONIN QUANT: CPT | Performed by: HOSPITALIST

## 2023-02-24 PROCEDURE — 99153 MOD SED SAME PHYS/QHP EA: CPT | Performed by: INTERNAL MEDICINE

## 2023-02-24 PROCEDURE — 93325 DOPPLER ECHO COLOR FLOW MAPG: CPT

## 2023-02-24 PROCEDURE — 250N000013 HC RX MED GY IP 250 OP 250 PS 637: Performed by: HOSPITALIST

## 2023-02-24 PROCEDURE — 84443 ASSAY THYROID STIM HORMONE: CPT | Performed by: HOSPITALIST

## 2023-02-24 DEVICE — IMPLANTABLE DEVICE: Type: IMPLANTABLE DEVICE | Status: FUNCTIONAL

## 2023-02-24 RX ORDER — NALOXONE HYDROCHLORIDE 0.4 MG/ML
0.4 INJECTION, SOLUTION INTRAMUSCULAR; INTRAVENOUS; SUBCUTANEOUS
Status: DISCONTINUED | OUTPATIENT
Start: 2023-02-24 | End: 2023-03-02 | Stop reason: HOSPADM

## 2023-02-24 RX ORDER — CEFAZOLIN SODIUM 1 G/3ML
1 INJECTION, POWDER, FOR SOLUTION INTRAMUSCULAR; INTRAVENOUS
Status: DISCONTINUED | OUTPATIENT
Start: 2023-02-24 | End: 2023-02-24 | Stop reason: HOSPADM

## 2023-02-24 RX ORDER — CEFAZOLIN SODIUM 2 G/100ML
2 INJECTION, SOLUTION INTRAVENOUS
Status: DISCONTINUED | OUTPATIENT
Start: 2023-02-24 | End: 2023-02-24

## 2023-02-24 RX ORDER — AMOXICILLIN 250 MG
1 CAPSULE ORAL 2 TIMES DAILY PRN
Status: DISCONTINUED | OUTPATIENT
Start: 2023-02-24 | End: 2023-03-02 | Stop reason: HOSPADM

## 2023-02-24 RX ORDER — HEPARIN SODIUM 200 [USP'U]/100ML
100-500 INJECTION, SOLUTION INTRAVENOUS CONTINUOUS PRN
Status: DISCONTINUED | OUTPATIENT
Start: 2023-02-24 | End: 2023-02-24 | Stop reason: HOSPADM

## 2023-02-24 RX ORDER — LIDOCAINE HYDROCHLORIDE 40 MG/ML
1.5 SOLUTION TOPICAL ONCE
Status: COMPLETED | OUTPATIENT
Start: 2023-02-24 | End: 2023-02-24

## 2023-02-24 RX ORDER — AMOXICILLIN 250 MG
2 CAPSULE ORAL 2 TIMES DAILY PRN
Status: DISCONTINUED | OUTPATIENT
Start: 2023-02-24 | End: 2023-03-02 | Stop reason: HOSPADM

## 2023-02-24 RX ORDER — DOBUTAMINE HYDROCHLORIDE 200 MG/100ML
5-40 INJECTION INTRAVENOUS CONTINUOUS PRN
Status: DISCONTINUED | OUTPATIENT
Start: 2023-02-24 | End: 2023-02-24 | Stop reason: HOSPADM

## 2023-02-24 RX ORDER — FENTANYL CITRATE 50 UG/ML
INJECTION, SOLUTION INTRAMUSCULAR; INTRAVENOUS
Status: DISCONTINUED | OUTPATIENT
Start: 2023-02-24 | End: 2023-02-24 | Stop reason: HOSPADM

## 2023-02-24 RX ORDER — HEPARIN SODIUM 200 [USP'U]/100ML
100-600 INJECTION, SOLUTION INTRAVENOUS CONTINUOUS PRN
Status: DISCONTINUED | OUTPATIENT
Start: 2023-02-24 | End: 2023-02-24 | Stop reason: HOSPADM

## 2023-02-24 RX ORDER — LIDOCAINE 40 MG/G
CREAM TOPICAL
Status: DISCONTINUED | OUTPATIENT
Start: 2023-02-24 | End: 2023-02-24

## 2023-02-24 RX ORDER — NALOXONE HYDROCHLORIDE 0.4 MG/ML
0.2 INJECTION, SOLUTION INTRAMUSCULAR; INTRAVENOUS; SUBCUTANEOUS
Status: DISCONTINUED | OUTPATIENT
Start: 2023-02-24 | End: 2023-03-02 | Stop reason: HOSPADM

## 2023-02-24 RX ORDER — FLUMAZENIL 0.1 MG/ML
0.2 INJECTION, SOLUTION INTRAVENOUS
Status: DISCONTINUED | OUTPATIENT
Start: 2023-02-24 | End: 2023-02-24

## 2023-02-24 RX ORDER — KETOROLAC TROMETHAMINE 30 MG/ML
INJECTION, SOLUTION INTRAMUSCULAR; INTRAVENOUS
Status: DISCONTINUED | OUTPATIENT
Start: 2023-02-24 | End: 2023-02-24 | Stop reason: HOSPADM

## 2023-02-24 RX ORDER — CEFAZOLIN SODIUM 1 G/3ML
1 INJECTION, POWDER, FOR SOLUTION INTRAMUSCULAR; INTRAVENOUS EVERY 8 HOURS
Status: COMPLETED | OUTPATIENT
Start: 2023-02-25 | End: 2023-02-25

## 2023-02-24 RX ORDER — NALOXONE HYDROCHLORIDE 0.4 MG/ML
0.4 INJECTION, SOLUTION INTRAMUSCULAR; INTRAVENOUS; SUBCUTANEOUS
Status: DISCONTINUED | OUTPATIENT
Start: 2023-02-24 | End: 2023-02-24

## 2023-02-24 RX ORDER — FENTANYL CITRATE 50 UG/ML
50 INJECTION, SOLUTION INTRAMUSCULAR; INTRAVENOUS ONCE
Status: COMPLETED | OUTPATIENT
Start: 2023-02-24 | End: 2023-02-24

## 2023-02-24 RX ORDER — DEXTROSE MONOHYDRATE 25 G/50ML
9.5 INJECTION, SOLUTION INTRAVENOUS
Status: DISCONTINUED | OUTPATIENT
Start: 2023-02-24 | End: 2023-02-24

## 2023-02-24 RX ORDER — LIDOCAINE 50 MG/G
OINTMENT TOPICAL ONCE
Status: COMPLETED | OUTPATIENT
Start: 2023-02-24 | End: 2023-02-24

## 2023-02-24 RX ORDER — LIDOCAINE HYDROCHLORIDE 10 MG/ML
INJECTION, SOLUTION INFILTRATION; PERINEURAL
Status: DISCONTINUED | OUTPATIENT
Start: 2023-02-24 | End: 2023-02-24 | Stop reason: HOSPADM

## 2023-02-24 RX ORDER — MIDAZOLAM HCL IN 0.9 % NACL/PF 1 MG/ML
.5-6 PLASTIC BAG, INJECTION (ML) INTRAVENOUS CONTINUOUS PRN
Status: DISCONTINUED | OUTPATIENT
Start: 2023-02-24 | End: 2023-02-24 | Stop reason: HOSPADM

## 2023-02-24 RX ORDER — NALOXONE HYDROCHLORIDE 0.4 MG/ML
0.2 INJECTION, SOLUTION INTRAMUSCULAR; INTRAVENOUS; SUBCUTANEOUS
Status: DISCONTINUED | OUTPATIENT
Start: 2023-02-24 | End: 2023-02-24

## 2023-02-24 RX ORDER — ONDANSETRON 2 MG/ML
4 INJECTION INTRAMUSCULAR; INTRAVENOUS EVERY 6 HOURS PRN
Status: DISCONTINUED | OUTPATIENT
Start: 2023-02-24 | End: 2023-03-02 | Stop reason: HOSPADM

## 2023-02-24 RX ORDER — GLYCOPYRROLATE 0.2 MG/ML
0.1 INJECTION, SOLUTION INTRAMUSCULAR; INTRAVENOUS ONCE
Status: COMPLETED | OUTPATIENT
Start: 2023-02-24 | End: 2023-02-24

## 2023-02-24 RX ORDER — ACETAMINOPHEN 325 MG/1
975 TABLET ORAL EVERY 8 HOURS
Status: DISCONTINUED | OUTPATIENT
Start: 2023-02-24 | End: 2023-03-02 | Stop reason: HOSPADM

## 2023-02-24 RX ORDER — HYDROMORPHONE HYDROCHLORIDE 1 MG/ML
0.5 INJECTION, SOLUTION INTRAMUSCULAR; INTRAVENOUS; SUBCUTANEOUS ONCE
Status: COMPLETED | OUTPATIENT
Start: 2023-02-24 | End: 2023-02-24

## 2023-02-24 RX ORDER — ONDANSETRON 4 MG/1
4 TABLET, ORALLY DISINTEGRATING ORAL EVERY 6 HOURS PRN
Status: DISCONTINUED | OUTPATIENT
Start: 2023-02-24 | End: 2023-03-02 | Stop reason: HOSPADM

## 2023-02-24 RX ORDER — SODIUM CHLORIDE 9 MG/ML
INJECTION, SOLUTION INTRAVENOUS CONTINUOUS PRN
Status: DISCONTINUED | OUTPATIENT
Start: 2023-02-24 | End: 2023-02-24

## 2023-02-24 RX ORDER — FENTANYL CITRATE 50 UG/ML
25 INJECTION, SOLUTION INTRAMUSCULAR; INTRAVENOUS
Status: DISCONTINUED | OUTPATIENT
Start: 2023-02-24 | End: 2023-02-24

## 2023-02-24 RX ORDER — SODIUM CHLORIDE 450 MG/100ML
INJECTION, SOLUTION INTRAVENOUS CONTINUOUS
Status: DISCONTINUED | OUTPATIENT
Start: 2023-02-24 | End: 2023-02-24

## 2023-02-24 RX ORDER — LIDOCAINE 40 MG/G
CREAM TOPICAL
Status: DISCONTINUED | OUTPATIENT
Start: 2023-02-24 | End: 2023-03-02 | Stop reason: HOSPADM

## 2023-02-24 RX ORDER — OXYCODONE AND ACETAMINOPHEN 5; 325 MG/1; MG/1
1 TABLET ORAL EVERY 4 HOURS PRN
Status: DISCONTINUED | OUTPATIENT
Start: 2023-02-24 | End: 2023-03-02 | Stop reason: HOSPADM

## 2023-02-24 RX ADMIN — MIDAZOLAM 1 MG: 1 INJECTION INTRAMUSCULAR; INTRAVENOUS at 08:54

## 2023-02-24 RX ADMIN — HEPARIN SODIUM 1250 UNITS/HR: 10000 INJECTION, SOLUTION INTRAVENOUS at 11:49

## 2023-02-24 RX ADMIN — MIDAZOLAM 1 MG: 1 INJECTION INTRAMUSCULAR; INTRAVENOUS at 09:27

## 2023-02-24 RX ADMIN — MIDAZOLAM 1 MG: 1 INJECTION INTRAMUSCULAR; INTRAVENOUS at 08:57

## 2023-02-24 RX ADMIN — ACETAMINOPHEN 975 MG: 325 TABLET, FILM COATED ORAL at 19:37

## 2023-02-24 RX ADMIN — TOPICAL ANESTHETIC 1 ML: 200 SPRAY DENTAL; PERIODONTAL at 08:48

## 2023-02-24 RX ADMIN — MIDAZOLAM 1 MG: 1 INJECTION INTRAMUSCULAR; INTRAVENOUS at 08:59

## 2023-02-24 RX ADMIN — MIDAZOLAM 1 MG: 1 INJECTION INTRAMUSCULAR; INTRAVENOUS at 09:04

## 2023-02-24 RX ADMIN — GLYCOPYRROLATE 0.1 MG: 0.2 INJECTION, SOLUTION INTRAMUSCULAR; INTRAVENOUS at 08:27

## 2023-02-24 RX ADMIN — FENTANYL CITRATE 25 MCG: 50 INJECTION, SOLUTION INTRAMUSCULAR; INTRAVENOUS at 08:57

## 2023-02-24 RX ADMIN — FENTANYL CITRATE 25 MCG: 50 INJECTION, SOLUTION INTRAMUSCULAR; INTRAVENOUS at 08:55

## 2023-02-24 RX ADMIN — HYDROMORPHONE HYDROCHLORIDE 0.5 MG: 1 INJECTION, SOLUTION INTRAMUSCULAR; INTRAVENOUS; SUBCUTANEOUS at 21:17

## 2023-02-24 RX ADMIN — CEFAZOLIN SODIUM 2 G: 2 INJECTION, SOLUTION INTRAVENOUS at 16:32

## 2023-02-24 RX ADMIN — FENTANYL CITRATE 25 MCG: 50 INJECTION, SOLUTION INTRAMUSCULAR; INTRAVENOUS at 09:02

## 2023-02-24 RX ADMIN — MIDAZOLAM 1 MG: 1 INJECTION INTRAMUSCULAR; INTRAVENOUS at 09:01

## 2023-02-24 RX ADMIN — LIDOCAINE HYDROCHLORIDE 1.5 ML: 40 SOLUTION TOPICAL at 08:26

## 2023-02-24 RX ADMIN — FENTANYL CITRATE 25 MCG: 50 INJECTION, SOLUTION INTRAMUSCULAR; INTRAVENOUS at 09:00

## 2023-02-24 RX ADMIN — SODIUM CHLORIDE: 9 INJECTION, SOLUTION INTRAVENOUS at 08:33

## 2023-02-24 ASSESSMENT — ACTIVITIES OF DAILY LIVING (ADL)
ADLS_ACUITY_SCORE: 35
ADLS_ACUITY_SCORE: 36
ADLS_ACUITY_SCORE: 35

## 2023-02-24 NOTE — PROGRESS NOTES
Reviewed Silvia's h/o mechanical TV and placement of what appears to be single chamber (epicardial lead) [place] PPM (placed at time ov TVR). PPM was placed in anterior soft tissues of upper abdomen given mechanical TV.      Called  Brayan to obtain consent as pt still has sedation on board post HANK this AM.  Called [phone] x [phone] - no answer, no ability to leave VM    Asked pt to let Brayan know that we are trying to reach him if he contacts her.  Have let DAXA MAJANO know to contact me if she gets another contact # (pt did not have one) or time I can reach him.    ** Addnedum 2/24/ @ 7688 - spoke with Dionaraseli on Silvia's phone. Reviewed R/B/I of procedure - We discussed the risks, benefits and indications of pacemaker generator replacement, including but not limited to use of conscious sedation and discomfort, device malfunction and/or recall, and infection requiring explantation of the device and long term antibiotics. Reviewed that would not expect peripheral vessel injury, pneumothorax, cardiac puncture and/or tamponade given no adjustment to epicardial lead. We also briefly discussed follow up expectations and restrictions following the procedure. The patient voiced understanding and is willing to proceed.  A consent form was signed via Telephone and witnessed by RN. Silvia declined offer to sign consent herself given very sleepy.        MAGGY Israel

## 2023-02-24 NOTE — PROGRESS NOTES
RECEIVING UNIT ED HANDOFF REVIEW    ED Nurse Handoff Report was reviewed by: DERRELL ESCOBEDO RN on February 23, 2023 at 11:14 PM

## 2023-02-24 NOTE — CONSULTS
[facility]    Cardiac Electrophysiology Consultation     Date of Admission:  2/23/2023  Date of Consult (When I saw the patient): 02/24/23    Assessment & Plan   Silvia Otero is a 27 year old female who was admitted on 2/23/2023. I was asked to see the patient for single chamber (SJM) pacemaker at [place]. 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 She was followed closely until 2001 when her TV was replaced. In 2016 a new mechanical TV was placed as she outgrew the old one. An epicardial LV was implanted attaching to the generator placed in mid upper abdomen. All the above was done in [place]. [place] [facility] [place] a year ago and have not seen by cardiology. She contacted our device clinic yesterday to be seen asap as she was told her device needs to be replaced. Per our device staff she reported no sxs at that time. Patient was scheduled to see me today at our [place] clinic.    I was contacted by ED staff yesterday pm about this patient. I was told she was in atrial flutter. I recommended to call me back after device interrogation as device would able to tell us duration of her atrial flutter and whether or not device had reached [facility]. Apparently, remote device check was not possible. In person interrogation shows that device had reached [facility]ISON since last September. Echo show questionable clot on the TV with high gradient. HANK was done this am. No evidence of systemic infection. Afebrile.    Pt's sxs could be from device at [facility] with pacing at one a rate of 70 bpm without rate adaptive capability, unknown duration of atrial arrhythmia or possible TV stenosis if gradient truly high. She would need device generator replacement today as it had been 5 months since device had reached [facility]. Normally would benefit from dual chamber ppm but given mechanical TV new endocardial RV lead implantation would not prudent.  Her LVEF is normal. She was doing well prior to recently despite having a single chamber ppm.  Consent obtained from her spouse as she received sedation for HANK this am.    Dayne Garcia MD    Code Status    Full Code    Primary Care Physician   Physician No Ref-Primary    History is obtained from the patient and electronic health record      Past Medical History   I have reviewed this patient's medical history and updated it with pertinent information if needed.   No past medical history on file.    Past Surgical History   I have reviewed this patient's surgical history and updated it with pertinent information if needed.  No past surgical history on file.    Prior to Admission Medications   Prior to Admission Medications   Prescriptions Last Dose Informant Patient Reported? Taking?   acetaminophen (TYLENOL) 325 MG tablet 2/21/2023  Yes Yes   Sig: Take 650 mg by mouth daily as needed for mild pain   warfarin ANTICOAGULANT (COUMADIN) 10 MG tablet 2/22/2023 at 10 mg at 2300 Self Yes Yes   Sig: Take by mouth See Admin Instructions 10 mg on Mondays thru Thursdays, then 15 mg Fridays thru Sundays.      Facility-Administered Medications: None     Allergies   No Known Allergies    Social History   I have reviewed this patient's social history and updated it with pertinent information if needed. Silvia Otero      Family History   I have reviewed this patient's family history and updated it with pertinent information if needed.   No family history on file.    Review of Systems   NA     Physical Exam   Temp: 98.2  F (36.8  C) Temp src: Oral BP: 115/68 Pulse: 64   Resp: 15 SpO2: 100 % O2 Device: Nasal cannula Oxygen Delivery: 1 LPM  Vital Signs with Ranges  Temp:  [97.5  F (36.4  C)-98.2  F (36.8  C)] 98.2  F (36.8  C)  Pulse:  [62-65] 64  Resp:  [11-28] 15  BP: (100-132)/(66-85) 115/68  SpO2:  [97 %-100 %] 100 %  164 lbs 6.4 oz    Constitutional: awake, alert, cooperative, no apparent distress, and appears stated  age  Eyes: Lids and lashes normal, pupils equal, round and reactive to light, extra ocular muscles intact, sclera clear, conjunctiva normal  ENT: Normocephalic, without obvious abnormality, atraumatic, sinuses nontender on palpation, external ears without lesions, oral pharynx with moist mucous membranes, tonsils without erythema or exudates, gums normal and good dentition.  Hematologic / Lymphatic: no cervical lymphadenopathy  Respiratory: No increased work of breathing, good air exchange, clear to auscultation bilaterally, no crackles or wheezing  Cardiovascular: Normal apical impulse, regular rate and rhythm, normal S1 and S2, no S3 or S4, and no murmur noted. Mechanical sound present  GI: No scars, normal bowel sounds, soft, non-distended, non-tender, no masses palpated, no hepatosplenomegally  Skin: no bruising or bleeding  Musculoskeletal: There is no redness, warmth, or swelling of the joints.  Full range of motion noted.    Neurologic: Awake, alert,.  Neuropsychiatric: General: normal, calm and normal eye contact    Data   I personally reviewed all recent ECGs and images.

## 2023-02-24 NOTE — PRE-PROCEDURE
GENERAL PRE-PROCEDURE:   Procedure:  PPM gen change  Date/Time:  2/24/2023 4:38 PM    Written consent obtained?: Yes    Risks and benefits: Risks, benefits and alternatives were discussed    Consent given by:  Patient  Patient states understanding of procedure being performed: Yes    Patient's understanding of procedure matches consent: Yes    Procedure consent matches procedure scheduled: Yes    Expected level of sedation:  Moderate  Appropriately NPO:  Yes  Mallampati  :  Grade 2- soft palate, base of uvula, tonsillar pillars, and portion of posterior pharyngeal wall visible  Lungs:  Lungs clear with good breath sounds bilaterally  Heart:  Normal heart sounds and rate  History & Physical reviewed:  History and physical reviewed and no updates needed  Statement of review:  I have reviewed the lab findings, diagnostic data, medications, and the plan for sedation

## 2023-02-24 NOTE — PROVIDER NOTIFICATION
MD Notification    Notified Person: MD    Notified Person Name: Dr. Gaxiola    Notification Date/Time: 2- 0733    Notification Interaction: OrderBorder messaging    Purpose of Notification: Code status order needed    Orders Received:    Comments:

## 2023-02-24 NOTE — PROGRESS NOTES
[facility]  Hospitalist Progress Note   02/24/2023          Assessment and Plan:       Silvia Otero is a 27 year old female with history of tricuspid valve replacement, third-degree AV block with pacemaker admitted on 2/23/2023 after she presented with chest pain. Found to have a possible intracardiac thrombus around her mechanical valve.     Third-degree AV block status post pacemaker placement  *Patient had pacemaker placed and [place] in 2016 and was informed she was to have the battery changed this year.  *She presents with symptoms of chest pain, shortness of breath and lightheadedness for the past 1 week and is concerned for pacemaker malfunction.  *EKG showing sawtooth waves with heart rate in the 170s and no pacer spikes.  Chest x-ray mechanical heart valve, median sternotomy wires, implantable cardiac device in the anterior soft tissue of the upper abdomen noted.  Troponin high-sensitivity less than 6.  proBNP 127.  In person interrogation of device showed that device had reached [facility]ISON since last September.  --EP cardiology following.  Plans for HANK today, possible device generator replacement today.  Follow TSH, magnesium levels.     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  Goal INR between 2.5-3.0  Echo obtained in the ED revealed possible mechanical valve thrombus  Telemetry monitoring.  Continue IV heparin drip.  Plan for HANK today.  Cardiology following.  Appreciate comanagement    Atrial flutter  Seen on EKG  Rates currently controlled  Continue IV heparin  EP following, appreciate input.     Anemia with microcytosis.  * Hgb 8.9 on admission.  MCV 76  * baseline unclear as patient recently migrated from [place], however patient is on anticoagulation. No black stools or gross bleeding noted.  Monitor hemoglobin levels in AM   Follow iron studies, TSH,  LFTs.       Orders Placed This Encounter      NPO for Medical/Clinical Reasons Except for: Other; Specify: NPO for 1 hour after HANK then Advance diet as tolerated to Adult Basic Diet      DVT Prophylaxis: SCDs, on IV heparin.  Code Status: Full Code  Disposition: Expected discharge in 1 to 2 days pending cardiology clearance.    Discussed with patient, floor team.  >35 minutes spent by me on the date of service doing chart review, history, exam, documentation & further activities per the note.      Hannah Gaxiola MD        Interval History:      Patient lying in bed.  Denies any chest pain or shortness of breath.  No headache or dizziness.  No nausea or vomiting.  No new tingling or numbness.  No focal weakness.  N.p.o. this morning.  Plan for HANK  On intravenous heparin.         Physical Exam:        Physical Exam   Temp:  [97.5  F (36.4  C)-98.2  F (36.8  C)] 98.2  F (36.8  C)  Pulse:  [62-65] 62  Resp:  [11-28] 21  BP: (100-132)/(66-85) 122/77  SpO2:  [97 %-100 %] 99 %    Intake/Output Summary (Last 24 hours) at 2/24/2023 1106  Last data filed at 2/24/2023 1010  Gross per 24 hour   Intake 400 ml   Output --   Net 400 ml       Admission Weight: 74.4 kg (164 lb)  Current Weight: 74.6 kg (164 lb 6.4 oz)    PHYSICAL EXAM  GENERAL: Patient is in no distress. Alert and oriented.  HEENT: Oropharynx pink  HEART: Regular rate and rhythm. S1S2.  Systolic murmur present.  LUNGS: Clear to auscultation bilaterally. No expiratory wheeze.  Respirations unlabored  ABDOMEN: Soft, no abdominal tenderness, bowel sounds heard   NEURO: Moving all extremities  EXTREMITIES: No pedal edema.  SKIN: Warm, dry. No rash   PSYCHIATRY Cooperative       Medications:          sodium chloride (PF)  3 mL Intravenous Q8H     sodium chloride (PF)  3 mL Intracatheter Q8H     sodium chloride (PF) **OR** dextrose, fentaNYL, flumazenil, - MEDICATION INSTRUCTIONS -, HOLD MEDICATION, HOLD MEDICATION, HOLD MEDICATION, lidocaine 4%, lidocaine (buffered  or not buffered), - MEDICATION INSTRUCTIONS -, midazolam, naloxone, naloxone, naloxone, naloxone, sodium chloride (PF), sodium chloride (PF), sodium chloride         Data:      All new lab and imaging data was reviewed.

## 2023-02-24 NOTE — PROGRESS NOTES
0800 A/O. Pt denies difficulty swallowing or sleep apnea. No dentures or loose teeth. NPO x 10 hrs. Discussed/reviewed procedure with pt pre procedure w/ verbal understanding received.  All questions & concerns addressed.     HANK: Pt tolerated well. VSS. Total sedation given - 6 mg Versed & 100 mcg Fentanyl. Dr Villalobos unable to speak with pt post procedure due to sedation.  See Procedural sedation flowsheet.     1100 Pt transferred to [facility] per WC. Detailed report called to Mary Huffman RN.  Resp even & unlabored upon transfer. Pt  A/O. Pt to be NPO until 1100 or longer if going to have Gen change today. Both pt & nurse informed.

## 2023-02-24 NOTE — PHARMACY-ADMISSION MEDICATION HISTORY
Pharmacy Medication History  Admission medication history interview status for the 2/23/2023  admission is complete. See EPIC admission navigator for prior to admission medications     Location of Interview: Patient room  Medication history sources: Patient and Surescripts    In the past week, patient estimated taking medication this percent of the time: greater than 90%    Medication Affordability:  Not including over the counter (OTC) medications, was there a time in the past 12 months when you did not take your medications as prescribed because of cost?: No    Medication reconciliation completed by provider prior to medication history? No    Time spent in this activity: 5 minutes    Prior to Admission medications    Medication Sig Last Dose Taking? Auth Provider Long Term End Date   acetaminophen (TYLENOL) 325 MG tablet Take 650 mg by mouth daily as needed for mild pain 2/21/2023 Yes Unknown, Entered By History     warfarin ANTICOAGULANT (COUMADIN) 10 MG tablet Take by mouth See Admin Instructions 10 mg on Mondays thru Thursdays, then 15 mg Fridays thru Sundays. 2/22/2023 at 10 mg at 2300 Yes Unknown, Entered By History         The information provided in this note is only as accurate as the sources available at the time of update(s)

## 2023-02-24 NOTE — PRE-PROCEDURE
GENERAL PRE-PROCEDURE:   Procedure:  HANK  Date/Time:  2/24/2023 8:49 AM    Verbal consent obtained?: Yes    Written consent obtained?: Yes    Risks and benefits: Risks, benefits and alternatives were discussed    Consent given by:  Patient  Patient states understanding of procedure being performed: Yes    Patient's understanding of procedure matches consent: Yes    Procedure consent matches procedure scheduled: Yes    Appropriately NPO:  Yes  Mallampati  :  Grade 2- soft palate, base of uvula, tonsillar pillars, and portion of posterior pharyngeal wall visible  Lungs:  Lungs clear with good breath sounds bilaterally  Heart:  Normal heart sounds and rate and a-flutter  History & Physical reviewed:  History and physical reviewed and no updates needed  Statement of review:  I have reviewed the lab findings, diagnostic data, medications, and the plan for sedation

## 2023-02-24 NOTE — PLAN OF CARE
Goal Outcome Evaluation:       Goal Outcome Evaluation:      Neuro: A&O X4  Tele/cardiac: A-Flutter, ECHO shows tricuspid regurg  Respiration: RA  Activity: Independent in room  Pain: None  Drips: PIV, heparin 1250 units/ 12.5 mL  Drains/tubes: none  Skin: good  GI/:   Aggression color: green       HANK to make decisions on plan

## 2023-02-24 NOTE — ED NOTES
[facility]  ED Nurse Handoff Report    ED Chief complaint: Cardiac Device Malfunction and Chest Pain      ED Diagnosis:   Final diagnoses:   Mural thrombus of heart   Atrial flutter, unspecified type (H)       Code Status: Full Code    Allergies: No Known Allergies    Patient Story: Patient states she has chest heaviness that comes and goes. Pacemaker placed 2016 and needs change. PT. Also states she has dizziness at times.     Focused Assessment:  Pacemaker placed in [place] in 2016 and was told that she would need the battery replaced this year. About a week ago the patient started to develop stabbing chest pain, shortness of breath, dizziness, lightheadedness, and fatigue. She has been in communication with cardiology and is scheduled for an appointment with electrophysiology tomorrow. Today her symptoms worsened, prompting her to come into the ED for evaluation. Upon evaluation in the ED she reports that her pain has improved. She denies any recent fever, cough, or leg swelling. She has been taking her Coumadin as prescribed.  No numbness tingling or weakness.    Treatments and/or interventions provided:   Labs Ordered and Resulted from Time of ED Arrival to Time of ED Departure   INR - Abnormal       Result Value    INR 2.17 (*)    CBC WITH PLATELETS AND DIFFERENTIAL - Abnormal    WBC Count 3.3 (*)     RBC Count 3.96      Hemoglobin 8.9 (*)     Hematocrit 30.0 (*)     MCV 76 (*)     MCH 22.5 (*)     MCHC 29.7 (*)     RDW 17.7 (*)     Platelet Count 179      % Neutrophils 63      % Lymphocytes 31      % Monocytes 5      % Eosinophils 1      % Basophils 0      % Immature Granulocytes 0      NRBCs per 100 WBC 0      Absolute Neutrophils 2.1      Absolute Lymphocytes 1.0      Absolute Monocytes 0.2      Absolute Eosinophils 0.0      Absolute Basophils 0.0      Absolute Immature Granulocytes 0.0      Absolute NRBCs 0.0     BASIC METABOLIC PANEL - Normal    Sodium 138      Potassium 4.0       "Chloride 103      Carbon Dioxide (CO2) 23      Anion Gap 12      Urea Nitrogen 14.2      Creatinine 0.65      Calcium 9.4      Glucose 82      GFR Estimate >90     TROPONIN T, HIGH SENSITIVITY - Normal    Troponin T, High Sensitivity <6     NT PROBNP INPATIENT - Normal    N terminal Pro BNP Inpatient 127     CBC WITH PLATELETS     Echocardiogram Complete   Final Result      XR Chest 2 Views   Final Result   IMPRESSION: Heart size is normal. Mechanical heart valve, median   sternotomy wires, and implantable cardiac device in the anterior soft   tissues of the upper abdomen are noted. No pleural effusion,   pneumothorax, or abnormal area of consolidation. Pulmonary vascularity   is normal in size.      RADHA MONTGOMERY MD            SYSTEM ID:  [id number]      Transesophageal Echocardiogram    (Results Pending)       To be done/followed up on inpatient unit:  Continue with heparin and HANK     Does this patient have any cognitive concerns?: None\    Activity level - Baseline/Home:  Independent  Activity Level - Current:   Independent    Patient's Preferred language: English   Needed?: No    Isolation: None  Infection: Not Applicable  Patient tested for COVID 19 prior to admission: NO  Bariatric?: No    Vital Signs:   Vitals:    02/23/23 1419 02/23/23 1845   BP: 126/72    Pulse: 63 65   Resp: 20 20   Temp: 97.5  F (36.4  C)    TempSrc: Temporal    SpO2: 99% 98%   Weight: 74.4 kg (164 lb)    Height: 1.753 m (5' 9\")        Cardiac Rhythm:     Was the PSS-3 completed:   Yes    Family Comments: Family at bedside   OBS brochure/video discussed/provided to patient/family: No                 For the majority of the shift this patient's behavior was Green.   Behavioral interventions performed were Care explained.    ED NURSE PHONE NUMBER: [phone]       "

## 2023-02-24 NOTE — PLAN OF CARE
Goal Outcome Evaluation:    VSS. Monitor remains Atrial flutter. Pt. Denies pain. Heparin drip continues at 1250 unit(s)/hr. HANK completed. Continue to monitor. Plan for pacemaker generator change today.

## 2023-02-25 LAB
ANION GAP SERPL CALCULATED.3IONS-SCNC: 10 MMOL/L (ref 7–15)
BUN SERPL-MCNC: 11.4 MG/DL (ref 6–20)
CALCIUM SERPL-MCNC: 9.6 MG/DL (ref 8.6–10)
CHLORIDE SERPL-SCNC: 107 MMOL/L (ref 98–107)
CREAT SERPL-MCNC: 0.69 MG/DL (ref 0.51–0.95)
DEPRECATED HCO3 PLAS-SCNC: 23 MMOL/L (ref 22–29)
ERYTHROCYTE [DISTWIDTH] IN BLOOD BY AUTOMATED COUNT: 17.3 % (ref 10–15)
ERYTHROCYTE [DISTWIDTH] IN BLOOD BY AUTOMATED COUNT: 17.4 % (ref 10–15)
GFR SERPL CREATININE-BSD FRML MDRD: >90 ML/MIN/1.73M2
GLUCOSE SERPL-MCNC: 88 MG/DL (ref 70–99)
HCT VFR BLD AUTO: 29.4 % (ref 35–47)
HCT VFR BLD AUTO: 31 % (ref 35–47)
HGB BLD-MCNC: 8.4 G/DL (ref 11.7–15.7)
HGB BLD-MCNC: 8.9 G/DL (ref 11.7–15.7)
HOLD SPECIMEN: NORMAL
INR PPP: 1.75 (ref 0.85–1.15)
MCH RBC QN AUTO: 22.1 PG (ref 26.5–33)
MCH RBC QN AUTO: 22.1 PG (ref 26.5–33)
MCHC RBC AUTO-ENTMCNC: 28.6 G/DL (ref 31.5–36.5)
MCHC RBC AUTO-ENTMCNC: 28.7 G/DL (ref 31.5–36.5)
MCV RBC AUTO: 77 FL (ref 78–100)
MCV RBC AUTO: 77 FL (ref 78–100)
PLATELET # BLD AUTO: 166 10E3/UL (ref 150–450)
PLATELET # BLD AUTO: 178 10E3/UL (ref 150–450)
POTASSIUM SERPL-SCNC: 3.6 MMOL/L (ref 3.4–5.3)
RBC # BLD AUTO: 3.8 10E6/UL (ref 3.8–5.2)
RBC # BLD AUTO: 4.02 10E6/UL (ref 3.8–5.2)
SODIUM SERPL-SCNC: 140 MMOL/L (ref 136–145)
UFH PPP CHRO-ACNC: 0.26 IU/ML
UFH PPP CHRO-ACNC: 0.31 IU/ML
WBC # BLD AUTO: 3.6 10E3/UL (ref 4–11)
WBC # BLD AUTO: 4.2 10E3/UL (ref 4–11)

## 2023-02-25 PROCEDURE — 85610 PROTHROMBIN TIME: CPT | Performed by: HOSPITALIST

## 2023-02-25 PROCEDURE — 99232 SBSQ HOSP IP/OBS MODERATE 35: CPT | Performed by: STUDENT IN AN ORGANIZED HEALTH CARE EDUCATION/TRAINING PROGRAM

## 2023-02-25 PROCEDURE — 250N000013 HC RX MED GY IP 250 OP 250 PS 637: Performed by: HOSPITALIST

## 2023-02-25 PROCEDURE — 36415 COLL VENOUS BLD VENIPUNCTURE: CPT | Performed by: STUDENT IN AN ORGANIZED HEALTH CARE EDUCATION/TRAINING PROGRAM

## 2023-02-25 PROCEDURE — 85520 HEPARIN ASSAY: CPT | Performed by: STUDENT IN AN ORGANIZED HEALTH CARE EDUCATION/TRAINING PROGRAM

## 2023-02-25 PROCEDURE — 85027 COMPLETE CBC AUTOMATED: CPT | Performed by: HOSPITALIST

## 2023-02-25 PROCEDURE — 250N000011 HC RX IP 250 OP 636: Performed by: INTERNAL MEDICINE

## 2023-02-25 PROCEDURE — 250N000013 HC RX MED GY IP 250 OP 250 PS 637: Performed by: INTERNAL MEDICINE

## 2023-02-25 PROCEDURE — 36415 COLL VENOUS BLD VENIPUNCTURE: CPT | Performed by: INTERNAL MEDICINE

## 2023-02-25 PROCEDURE — 85014 HEMATOCRIT: CPT | Performed by: INTERNAL MEDICINE

## 2023-02-25 PROCEDURE — 99232 SBSQ HOSP IP/OBS MODERATE 35: CPT | Mod: 24 | Performed by: INTERNAL MEDICINE

## 2023-02-25 PROCEDURE — 85520 HEPARIN ASSAY: CPT | Performed by: INTERNAL MEDICINE

## 2023-02-25 PROCEDURE — 210N000002 HC R&B HEART CARE

## 2023-02-25 PROCEDURE — 36415 COLL VENOUS BLD VENIPUNCTURE: CPT | Performed by: HOSPITALIST

## 2023-02-25 PROCEDURE — 250N000013 HC RX MED GY IP 250 OP 250 PS 637

## 2023-02-25 PROCEDURE — 82310 ASSAY OF CALCIUM: CPT | Performed by: HOSPITALIST

## 2023-02-25 PROCEDURE — 250N000013 HC RX MED GY IP 250 OP 250 PS 637: Performed by: STUDENT IN AN ORGANIZED HEALTH CARE EDUCATION/TRAINING PROGRAM

## 2023-02-25 RX ORDER — POLYETHYLENE GLYCOL 3350 17 G/17G
17 POWDER, FOR SOLUTION ORAL DAILY
Status: DISCONTINUED | OUTPATIENT
Start: 2023-02-25 | End: 2023-03-02 | Stop reason: HOSPADM

## 2023-02-25 RX ORDER — POLYETHYLENE GLYCOL 3350 17 G/17G
17 POWDER, FOR SOLUTION ORAL 2 TIMES DAILY PRN
Status: DISCONTINUED | OUTPATIENT
Start: 2023-02-25 | End: 2023-02-25

## 2023-02-25 RX ORDER — HEPARIN SODIUM 10000 [USP'U]/100ML
0-5000 INJECTION, SOLUTION INTRAVENOUS CONTINUOUS
Status: DISCONTINUED | OUTPATIENT
Start: 2023-02-25 | End: 2023-02-28

## 2023-02-25 RX ADMIN — CEFAZOLIN 1 G: 1 INJECTION, POWDER, FOR SOLUTION INTRAMUSCULAR; INTRAVENOUS at 00:20

## 2023-02-25 RX ADMIN — SENNOSIDES AND DOCUSATE SODIUM 2 TABLET: 50; 8.6 TABLET ORAL at 21:38

## 2023-02-25 RX ADMIN — CEFAZOLIN 1 G: 1 INJECTION, POWDER, FOR SOLUTION INTRAMUSCULAR; INTRAVENOUS at 08:10

## 2023-02-25 RX ADMIN — OXYCODONE HYDROCHLORIDE AND ACETAMINOPHEN 1 TABLET: 5; 325 TABLET ORAL at 00:20

## 2023-02-25 RX ADMIN — POLYETHYLENE GLYCOL 3350 17 G: 17 POWDER, FOR SOLUTION ORAL at 10:44

## 2023-02-25 RX ADMIN — CEFAZOLIN 1 G: 1 INJECTION, POWDER, FOR SOLUTION INTRAMUSCULAR; INTRAVENOUS at 16:20

## 2023-02-25 RX ADMIN — WARFARIN SODIUM 20 MG: 7.5 TABLET ORAL at 17:43

## 2023-02-25 RX ADMIN — HEPARIN SODIUM 900 UNITS/HR: 10000 INJECTION, SOLUTION INTRAVENOUS at 09:48

## 2023-02-25 RX ADMIN — ACETAMINOPHEN 975 MG: 325 TABLET, FILM COATED ORAL at 16:17

## 2023-02-25 RX ADMIN — ACETAMINOPHEN 975 MG: 325 TABLET, FILM COATED ORAL at 08:10

## 2023-02-25 RX ADMIN — ACETAMINOPHEN 975 MG: 325 TABLET, FILM COATED ORAL at 21:36

## 2023-02-25 RX ADMIN — OXYCODONE HYDROCHLORIDE AND ACETAMINOPHEN 1 TABLET: 5; 325 TABLET ORAL at 06:03

## 2023-02-25 RX ADMIN — SENNOSIDES AND DOCUSATE SODIUM 2 TABLET: 50; 8.6 TABLET ORAL at 09:54

## 2023-02-25 ASSESSMENT — ACTIVITIES OF DAILY LIVING (ADL)
ADLS_ACUITY_SCORE: 27
ADLS_ACUITY_SCORE: 27
ADLS_ACUITY_SCORE: 42
ADLS_ACUITY_SCORE: 36
ADLS_ACUITY_SCORE: 28
ADLS_ACUITY_SCORE: 36
ADLS_ACUITY_SCORE: 42
ADLS_ACUITY_SCORE: 36
ADLS_ACUITY_SCORE: 42
ADLS_ACUITY_SCORE: 42

## 2023-02-25 NOTE — PROVIDER NOTIFICATION
MD Notification    Notified Person: MD    Notified Person Name: Dr. Lorenzoluischi     Notification Date/Time:2/25/2023  0038    Notification Interaction:    Purpose of Notification:Pt c/o of ABD pain with ABD bleeding post PPM.  MD was updated on Pt's condition of ABD pain and moderate continuation of ABD bleeding after reenforcement of original dressing.  Pt's VSS are stable.  Purpose of notification was to ascertain if further intervention was needed.  MD was aware of Pt's current condition and wanted continued monitoring of Pt's condition over night and a HGB in the AM.    Orders Received:A CBC is ordered for the AM    Comments:

## 2023-02-25 NOTE — PHARMACY-ANTICOAGULATION SERVICE
Clinical Pharmacy - Warfarin Dosing Consult     Pharmacy has been consulted to manage this patient s warfarin therapy.  Indication: Other - specify in comments (Mechanical tricuspid valve replacement)  Therapy Goal: INR 2.5-3.5  Warfarin Prior to Admission: Yes  Warfarin PTA Regimen: 10mg Monday-Thursday; 15mg Fri,Sat,Sun  Recent documented change in oral intake/nutrition: No  Dose Comments: Doses held on 2/23 and 2/24.    INR   Date Value Ref Range Status   02/25/2023 1.75 (H) 0.85 - 1.15 Final   02/24/2023 2.11 (H) 0.85 - 1.15 Final       Recommend warfarin 20 mg today.  Pharmacy will monitor Silvia Riveraericla daily and order warfarin doses to achieve specified goal.      Please contact pharmacy as soon as possible if the warfarin needs to be held for a procedure or if the warfarin goals change.

## 2023-02-25 NOTE — PROVIDER NOTIFICATION
MD Notification    Notified Person: MD Cardiologist     Notified Person Name: Ni     Notification Date/Time: 2/24/23 2025    Notification Interaction: Telephone     Purpose of Notification: Abdominal PPM dressing nearly saturated with blood     Orders Received: Keep pt on strict bedrest, cover existing dressing with 4x4's and wrap abdomen with ace wrap.

## 2023-02-25 NOTE — PLAN OF CARE
Goal Outcome Evaluation:    VSS. Monitor shows 100% V.Paced rhythm. Tylenol given for incisional pain. Heparin drip continues at 900 unit(s)/hr. Senna and Miralax given for constipation. Abdominal dressing intact with no change this shift. Continue to monitor.

## 2023-02-25 NOTE — PROVIDER NOTIFICATION
MD Notification    Notified Person: MD Cardiologist     Notified Person Name: Dr. Regan     Notification Date/Time: 2/24/23 1800    Notification Interaction: Telephone      Orders Received: Hold Heparin drip this evening post procedure and recheck INR level tomorrow 2/25/23 AM. Monitor pt closely for site bleeding concerns.

## 2023-02-25 NOTE — PROGRESS NOTES
[facility]    Cardiology Progress Note    ASSESSMENT:  27-year-old female seen for tricuspid valve replacement and pacemaker.  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  She underwent tricuspid valve replacement in 2001 with repeat mechanical replacement in 2016.  Pacemaker implantation in 2006 with LV epicardial lead.  Device check upon admission showed ALLISON last September.    Yesterday she underwent [employer] pacemaker generator replacement.  She has pain at the abdominal site as expected.    Her tricuspid mechanical valve has very restricted bileaflet motion.  She reportedly has been on warfarin, though though it is unclear how therapeutic her INR has been.  This definitely could be thrombus that hopefully would improve with therapeutic INR levels.  Pannus or fibrous tissue is also possible.    Discussed case with the EP.  Plan will be to resume warfarin and bridge with low intensity heparin protocol, but no additional heparin bolus.    RECOMMENDATIONS:  1.  Status post abdominal generator replacement  -Stable  -Pain management, can use Percocet if needed  -Repeat hemoglobin this afternoon to follow trend    2.  Mechanical tricuspid valve with stenosis and bileaflet restricted motion  -Pharmacy consult to resume warfarin with goal INR around 2.5, bridge with low intensity heparin protocol, no initial bolus  -May need CV surgery consult    Patient will need to be admitted through the weekend.    Inderjit Blair MD  Cardiology - [facility]  Pager:  [phone]  Text Page    SUBJECTIVE: Moderate pain at the abdomen pacemaker generator change site.    MEDICATIONS:  Current Facility-Administered Medications   Medication     acetaminophen (TYLENOL) tablet 975 mg     ceFAZolin (ANCEF) 1 g vial to attach to  ml bag for ADULT or 50 ml bag for PEDS     HOLD: enoxaparin (LOVENOX) Post Procedure     HOLD: heparin (IV or Subcutaneous) Post Procedure      HOLD: metformin and metformin containing medications on day of procedure and 48 hours after IV contrast given     lidocaine (LMX4) cream     lidocaine 1 % 0.1-1 mL     melatonin tablet 1 mg     naloxone (NARCAN) injection 0.2 mg    Or     naloxone (NARCAN) injection 0.4 mg    Or     naloxone (NARCAN) injection 0.2 mg    Or     naloxone (NARCAN) injection 0.4 mg     ondansetron (ZOFRAN ODT) ODT tab 4 mg    Or     ondansetron (ZOFRAN) injection 4 mg     oxyCODONE-acetaminophen (PERCOCET) 5-325 MG per tablet 1 tablet     Patient is already receiving anticoagulation with heparin, enoxaparin (LOVENOX), warfarin (COUMADIN)  or other anticoagulant medication     senna-docusate (SENOKOT-S/PERICOLACE) 8.6-50 MG per tablet 1 tablet    Or     senna-docusate (SENOKOT-S/PERICOLACE) 8.6-50 MG per tablet 2 tablet     sodium chloride (PF) 0.9% PF flush 3 mL     sodium chloride (PF) 0.9% PF flush 3 mL       ALLERGIES:  No Known Allergies    REVIEW OF SYSTEMS:  General: no fatigue, weight loss  Cardiac: per HPI  Respiratory: per HPI  GI: no nausea, emesis, melena  Musculoskeletal: no weakness  Neurologic: no aphasia, paraesthesias  Neuropsychiatric: no depression    PHYSICAL EXAM:  Temp: 97.7  F (36.5  C) Temp src: Oral BP: 112/72 Pulse: 75   Resp: 18 SpO2: 97 % O2 Device: None (Room air) Oxygen Delivery: 1 LPM  Vital Signs with Ranges  Temp:  [97.4  F (36.3  C)-97.7  F (36.5  C)] 97.7  F (36.5  C)  Pulse:  [62-77] 75  Resp:  [11-39] 18  BP: ()/(57-88) 112/72  SpO2:  [97 %-100 %] 97 %  163 lbs 0 oz    Constitutional: awake, alert, Ox3  Eyes: PERRL, sclera nonicteric  ENT: trachea midline  Respiratory: Lungs clear  Cardiovascular: Regular rate and rhythm, 2/6 diastolic murmur at the upper sternal border  GI: nondistended, nontender, bowel sounds present  Lymph/Hematologic: no lymphadenopathy  Skin: dry, no rash, abdominal pacemaker site has some bloody drainage on the bandage, no hematoma  Musculoskeletal: good muscle tone,  strength 5/5 in upper and lower extremities  Neurologic: no focal deficits  Neuropsychiatric: appropriate affact    Intake/Output Summary (Last 24 hours) at 2/25/2023 0820  Last data filed at 2/25/2023 0400  Gross per 24 hour   Intake 880 ml   Output 500 ml   Net 380 ml     DATA:  Labs: Potassium 3.6, creatinine 0.7, WBC 3.6, hemoglobin 8.4, platelets 166, INR 1.7    Tele: Ventricular paced rhythm    Echo: HANK, February 24: EF 60%, normal RV function, mechanical tricuspid valve, no evidence of thrombus, mean gradient 12 mmHg,    Fluoroscopy of mechanical tricuspid valve yesterday showed minimal bileaflet movement, both leaflets appear fixed at about 45 degrees

## 2023-02-25 NOTE — PLAN OF CARE
Goal Outcome Evaluation:    Plan Of Care Reviewed With: Patient and Friend (at bedside)    A/Ox4 vitals stable post procedure. Friend at bedside. Tylenol and Dilaudid administered for pain control. PPM site located in the upper abdomen region VVIR . Dressing saturated with bloody drainage reinforced with extra gauze and ace bandage. Eating and drinking, due to void. Continue to monitor closely for bleeding.

## 2023-02-25 NOTE — PROGRESS NOTES
[facility]  Hospitalist Progress Note   02/25/2023          Assessment and Plan:       Silvia Otero is a 27 year old female with history of tricuspid valve replacement, third-degree AV block with pacemaker admitted on 2/23/2023 after she presented with chest pain. Found to have a possible intracardiac thrombus around her mechanical valve.     Third-degree AV block s/p PPM 2016  *Patient had pacemaker placed with her TV surgery in [place] in 2016  *She presents with symptoms of chest pain, shortness of breath and lightheadedness for the past 1 week  * In ED noted to be in Aflutter with heart rate in the 170s and no pacer spikes.  Chest x-ray mechanical heart valve, median sternotomy wires, implantable cardiac device in the   * In person interrogation of device showed that device had reached ALLISON since last September.    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 Goal INR between 2.5-3.0  Echo obtained in the ED revealed possible mechanical valve thrombus> follow up HANK demonstrated restricted leaflet movement and increased gradient    - cardiology considering CV surgery consult  - possibility of mechanical thrombus if patient has not been complaint with her warfarin, currently it appears she has not had any INR lab monitoring in quite some time.   - warfarin with heparin bridging tonight  - appreciate cardiology's assistance    Atrial flutter  - now paced     Anemia with microcytosis.  * Hgb 8.9 on admission.  MCV 76  * baseline unclear as patient recently migrated from [place],  however patient is on anticoagulation.    - labs revealed iron deficiency anemia  - patient endorses hx of heavy periods and she does take iron at home for hx of anemia  - continue PTA iron, consider IV infusion pending plan above    Constipation  - scheduled bowel meds     Orders Placed This Encounter      Regular Diet Adult      DVT Prophylaxis: SCDs, on IV heparin.  Code Status: Full Code  Disposition: Expected discharge in 1 to 2 days pending cardiology clearance.    Discussed with patient, floor team.  >45 minutes spent by me on the date of service doing chart review, history, exam, documentation & further activities per the note.      Mary Hackett, DO        Interval History:      Patient mostly complaining of abdominal distention and constipation today. Bleeding appears stable. Pain her PPM site but it is controlled. NO other issues. She reports hx of heavy periods and she takes iron at home         Physical Exam:        Physical Exam   Temp:  [97.4  F (36.3  C)-97.7  F (36.5  C)] 97.7  F (36.5  C)  Pulse:  [62-77] 75  Resp:  [11-39] 18  BP: ()/(57-88) 112/72  SpO2:  [97 %-100 %] 97 %    Intake/Output Summary (Last 24 hours) at 2/24/2023 1106  Last data filed at 2/24/2023 1010  Gross per 24 hour   Intake 400 ml   Output --   Net 400 ml       Admission Weight: 74.4 kg (164 lb)  Current Weight: 74.6 kg (164 lb 6.4 oz)    PHYSICAL EXAM  GENERAL: Patient is in no distress. Alert and oriented.  HEENT: Oropharynx pink  HEART: Regular rate and rhythm. S1S2. Click evident  LUNGS: Clear to auscultation bilaterally. No expiratory wheeze.  Respirations unlabored  ABDOMEN: Soft, no abdominal tenderness, bowel sounds heard PPM site in epgastric abdomen with bleeding on guaze  NEURO: Moving all extremities  EXTREMITIES: No pedal edema.  SKIN: Warm, dry. No rash   PSYCHIATRY Cooperative       Medications:          acetaminophen  975 mg Oral Q8H     ceFAZolin  1 g Intravenous Q8H     polyethylene glycol  17 g Oral  Daily     sodium chloride (PF)  3 mL Intracatheter Q8H     warfarin ANTICOAGULANT  20 mg Oral ONCE at 18:00     Warfarin Therapy Reminder  1 each Oral See Admin Instructions     HOLD MEDICATION, HOLD MEDICATION, HOLD MEDICATION, lidocaine 4%, lidocaine (buffered or not buffered), melatonin, naloxone **OR** naloxone **OR** naloxone **OR** naloxone, ondansetron **OR** ondansetron, oxyCODONE-acetaminophen, - MEDICATION INSTRUCTIONS -, senna-docusate **OR** senna-docusate, sodium chloride (PF)         Data:      All new lab and imaging data was reviewed.

## 2023-02-26 LAB
ANION GAP SERPL CALCULATED.3IONS-SCNC: 11 MMOL/L (ref 7–15)
BASOPHILS # BLD AUTO: 0 10E3/UL (ref 0–0.2)
BASOPHILS NFR BLD AUTO: 0 %
BUN SERPL-MCNC: 14.5 MG/DL (ref 6–20)
CALCIUM SERPL-MCNC: 9 MG/DL (ref 8.6–10)
CHLORIDE SERPL-SCNC: 104 MMOL/L (ref 98–107)
CREAT SERPL-MCNC: 0.58 MG/DL (ref 0.51–0.95)
DEPRECATED HCO3 PLAS-SCNC: 21 MMOL/L (ref 22–29)
EOSINOPHIL # BLD AUTO: 0.1 10E3/UL (ref 0–0.7)
EOSINOPHIL NFR BLD AUTO: 1 %
ERYTHROCYTE [DISTWIDTH] IN BLOOD BY AUTOMATED COUNT: 17.3 % (ref 10–15)
GFR SERPL CREATININE-BSD FRML MDRD: >90 ML/MIN/1.73M2
GLUCOSE SERPL-MCNC: 91 MG/DL (ref 70–99)
HCT VFR BLD AUTO: 27.6 % (ref 35–47)
HGB BLD-MCNC: 7.9 G/DL (ref 11.7–15.7)
IMM GRANULOCYTES # BLD: 0 10E3/UL
IMM GRANULOCYTES NFR BLD: 0 %
INR PPP: 1.63 (ref 0.85–1.15)
LYMPHOCYTES # BLD AUTO: 1 10E3/UL (ref 0.8–5.3)
LYMPHOCYTES NFR BLD AUTO: 24 %
MCH RBC QN AUTO: 22 PG (ref 26.5–33)
MCHC RBC AUTO-ENTMCNC: 28.6 G/DL (ref 31.5–36.5)
MCV RBC AUTO: 77 FL (ref 78–100)
MONOCYTES # BLD AUTO: 0.4 10E3/UL (ref 0–1.3)
MONOCYTES NFR BLD AUTO: 9 %
NEUTROPHILS # BLD AUTO: 2.7 10E3/UL (ref 1.6–8.3)
NEUTROPHILS NFR BLD AUTO: 66 %
NRBC # BLD AUTO: 0 10E3/UL
NRBC BLD AUTO-RTO: 0 /100
PLATELET # BLD AUTO: 133 10E3/UL (ref 150–450)
POTASSIUM SERPL-SCNC: 3.9 MMOL/L (ref 3.4–5.3)
RBC # BLD AUTO: 3.59 10E6/UL (ref 3.8–5.2)
SODIUM SERPL-SCNC: 136 MMOL/L (ref 136–145)
UFH PPP CHRO-ACNC: 0.29 IU/ML
WBC # BLD AUTO: 4.1 10E3/UL (ref 4–11)

## 2023-02-26 PROCEDURE — 36415 COLL VENOUS BLD VENIPUNCTURE: CPT | Performed by: STUDENT IN AN ORGANIZED HEALTH CARE EDUCATION/TRAINING PROGRAM

## 2023-02-26 PROCEDURE — 85520 HEPARIN ASSAY: CPT | Performed by: STUDENT IN AN ORGANIZED HEALTH CARE EDUCATION/TRAINING PROGRAM

## 2023-02-26 PROCEDURE — 258N000003 HC RX IP 258 OP 636: Performed by: STUDENT IN AN ORGANIZED HEALTH CARE EDUCATION/TRAINING PROGRAM

## 2023-02-26 PROCEDURE — 250N000011 HC RX IP 250 OP 636: Performed by: STUDENT IN AN ORGANIZED HEALTH CARE EDUCATION/TRAINING PROGRAM

## 2023-02-26 PROCEDURE — 250N000011 HC RX IP 250 OP 636: Performed by: INTERNAL MEDICINE

## 2023-02-26 PROCEDURE — 85004 AUTOMATED DIFF WBC COUNT: CPT | Performed by: STUDENT IN AN ORGANIZED HEALTH CARE EDUCATION/TRAINING PROGRAM

## 2023-02-26 PROCEDURE — 999N000128 HC STATISTIC PERIPHERAL IV START W/O US GUIDANCE

## 2023-02-26 PROCEDURE — 80048 BASIC METABOLIC PNL TOTAL CA: CPT | Performed by: STUDENT IN AN ORGANIZED HEALTH CARE EDUCATION/TRAINING PROGRAM

## 2023-02-26 PROCEDURE — 210N000002 HC R&B HEART CARE

## 2023-02-26 PROCEDURE — 99232 SBSQ HOSP IP/OBS MODERATE 35: CPT | Mod: 24 | Performed by: INTERNAL MEDICINE

## 2023-02-26 PROCEDURE — 85610 PROTHROMBIN TIME: CPT | Performed by: INTERNAL MEDICINE

## 2023-02-26 PROCEDURE — 250N000013 HC RX MED GY IP 250 OP 250 PS 637: Performed by: HOSPITALIST

## 2023-02-26 PROCEDURE — 250N000013 HC RX MED GY IP 250 OP 250 PS 637

## 2023-02-26 PROCEDURE — 99232 SBSQ HOSP IP/OBS MODERATE 35: CPT | Performed by: STUDENT IN AN ORGANIZED HEALTH CARE EDUCATION/TRAINING PROGRAM

## 2023-02-26 PROCEDURE — 250N000013 HC RX MED GY IP 250 OP 250 PS 637: Performed by: STUDENT IN AN ORGANIZED HEALTH CARE EDUCATION/TRAINING PROGRAM

## 2023-02-26 RX ADMIN — POLYETHYLENE GLYCOL 3350 17 G: 17 POWDER, FOR SOLUTION ORAL at 10:03

## 2023-02-26 RX ADMIN — ACETAMINOPHEN 975 MG: 325 TABLET, FILM COATED ORAL at 13:40

## 2023-02-26 RX ADMIN — SENNOSIDES AND DOCUSATE SODIUM 2 TABLET: 50; 8.6 TABLET ORAL at 10:03

## 2023-02-26 RX ADMIN — WARFARIN SODIUM 20 MG: 7.5 TABLET ORAL at 18:22

## 2023-02-26 RX ADMIN — IRON SUCROSE 300 MG: 20 INJECTION, SOLUTION INTRAVENOUS at 12:26

## 2023-02-26 RX ADMIN — ACETAMINOPHEN 975 MG: 325 TABLET, FILM COATED ORAL at 21:05

## 2023-02-26 RX ADMIN — HEPARIN SODIUM 900 UNITS/HR: 10000 INJECTION, SOLUTION INTRAVENOUS at 10:54

## 2023-02-26 RX ADMIN — ACETAMINOPHEN 975 MG: 325 TABLET, FILM COATED ORAL at 06:02

## 2023-02-26 ASSESSMENT — ACTIVITIES OF DAILY LIVING (ADL)
ADLS_ACUITY_SCORE: 28

## 2023-02-26 NOTE — PROGRESS NOTES
"SPIRITUAL HEALTH SERVICES Progress Note  [facility]    Saw pt Silvia Otero per patient request.    Patient/Family Understanding of Illness and Goals of Care - Silvia had a procedure two days ago to have a correction of her pacemaker. \"There is a blood clot on my heart valve so they are giving me blood thinners to try and dissolve it.\"    Distress and Loss - none named    Strengths, Coping, and Resources - Silvia shared that she has a strong and supportive group of family and friends who have been visiting her during her hospitalization.    Meaning, Beliefs, and Spirituality - Silvia shared that she trusts God is with her and that prayer is an important spiritual practice. She welcomed prayer, which I offered.    Plan of Care - [facility] remains available for support.    Vianey Lagos MDiv  Associate   Pager [phone]  [facility] pager [phone]  [facility] phone [phone]    [facility] available 24/7 for emergent requests/referrals, either by having the on-call  paged or by entering an ASAP/STAT consult in Epic (this will also page the on-call ).      "

## 2023-02-26 NOTE — PROGRESS NOTES
[id number]: A&Ox4. VSS on RA. Tele 100% V paced. Pacer incision, drsg dried blood, ace wrap in place, minimal pain, controlled on scheduled tylenol. Heparin infusing. Had small BM x1. Currently having menses.

## 2023-02-26 NOTE — PLAN OF CARE
Goal Outcome Evaluation:      A&O. VSS. Tele V-paced. Regular diet.  Up with standby assist. Scheduled tylenol for pain. Continues to complain of constipation, Dr. Hackett updated.

## 2023-02-26 NOTE — PLAN OF CARE
Goal Outcome Evaluation:    VSS. Monitor remains 100% V.Paced rhythm. Tylenol given for abdomen pacer incisional pain. Dressing intact with dried blood. No change this shift. Declines abdominal binder. Heparin drip continues at 900 unit(s)/hr. Continue to monitor.

## 2023-02-26 NOTE — PLAN OF CARE
Care plan note:      Recent Vitals:  Temp: 98.2  F (36.8  C) Temp src: Oral BP: 110/67 Pulse: 70   Resp: 18 SpO2: 98 % O2 Device: None (Room air)      Orientation/Neuro: Alert and Oriented x 4  Pain: Pain is controlled with current analgesics.  Medication(s) being used: acetaminophen   Tele: Paced   IV medications: Heparin   Mobility: St. by assist   Skin: Incision: Abd PPM incision   GI: constipation  : WDL     Diet: Tolerating diet:   Well  Orders Placed This Encounter      Regular Diet Adult      Safety/Concerns:  Fall Risk and Bleeding risk  Aggression Color: Green    Plan: Patient slept well overnight. VSS on RA. Patient denied chest pain and SOB. PPM incision has minimal drainage- dry blood, ace wrap covering- incisional pain controlled with scheduled tylenol. Pt is on menses. Heparin gtt running at 900 unit(s)/hr, next HepXa is tomorrow AM.   Continue to monitor.      Zelda Kureger RN

## 2023-02-26 NOTE — PROGRESS NOTES
[facility]    Cardiology Progress Note    ASSESSMENT:  27-year-old female seen for tricuspid valve replacement and pacemaker.  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  She underwent tricuspid valve replacement in 2001 with repeat mechanical replacement in 2016.  Pacemaker implantation in 2006 with LV epicardial lead.  Device check upon admission showed ALLISON last September. 2-24 she underwent [employer] [place] pacemaker generator replacement.  She has pain at the abdominal site as expected.    Her tricuspid mechanical valve has very restricted bileaflet motion on HANK and fluoro.  She reportedly has been on warfarin, though though it is unclear how therapeutic her INR has been.  This definitely could be thrombus that hopefully would improve with therapeutic INR levels.  Pannus or fibrous tissue is also possible.    Discussed case with the EP.  Plan will be to resume warfarin and bridge with low intensity heparin protocol, but no additional heparin bolus.    RECOMMENDATIONS:  1.  Status post abdominal generator replacement  -Stable  -Pain management, can use Percocet if needed  -Follow hemoglobin trend    2.  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    Inderjit Blair MD  Cardiology - [facility]  Pager:  [phone]  Text Page    SUBJECTIVE: Pain at the abdominal pacemaker site is better, she is able to walk and sit in the chair.  No other complaints.    MEDICATIONS:  Current Facility-Administered Medications   Medication     acetaminophen (TYLENOL) tablet 975 mg     heparin 25,000 units in 0.45% NaCl 250 mL ANTICOAGULANT infusion     HOLD: enoxaparin (LOVENOX) Post Procedure      HOLD: heparin (IV or Subcutaneous) Post Procedure     HOLD: metformin and metformin containing medications on day of procedure and 48 hours after IV contrast given     iron sucrose (VENOFER) 300 mg in sodium chloride 0.9 % 265 mL intermittent infusion     lidocaine (LMX4) cream     lidocaine 1 % 0.1-1 mL     melatonin tablet 1 mg     naloxone (NARCAN) injection 0.2 mg    Or     naloxone (NARCAN) injection 0.4 mg    Or     naloxone (NARCAN) injection 0.2 mg    Or     naloxone (NARCAN) injection 0.4 mg     ondansetron (ZOFRAN ODT) ODT tab 4 mg    Or     ondansetron (ZOFRAN) injection 4 mg     oxyCODONE-acetaminophen (PERCOCET) 5-325 MG per tablet 1 tablet     Patient is already receiving anticoagulation with heparin, enoxaparin (LOVENOX), warfarin (COUMADIN)  or other anticoagulant medication     polyethylene glycol (MIRALAX) Packet 17 g     senna-docusate (SENOKOT-S/PERICOLACE) 8.6-50 MG per tablet 1 tablet    Or     senna-docusate (SENOKOT-S/PERICOLACE) 8.6-50 MG per tablet 2 tablet     sodium chloride (PF) 0.9% PF flush 3 mL     sodium chloride (PF) 0.9% PF flush 3 mL     warfarin ANTICOAGULANT (COUMADIN) tablet 20 mg     Warfarin Dose Required Daily - Pharmacist Managed       ALLERGIES:  No Known Allergies    REVIEW OF SYSTEMS:  General: no fatigue, weight loss  Cardiac: per HPI  Respiratory: per HPI  GI: no nausea, emesis, melena  Musculoskeletal: no weakness  Neurologic: no aphasia, paraesthesias  Neuropsychiatric: no depression    PHYSICAL EXAM:  Temp: 98.6  F (37  C) Temp src: Oral BP: 118/70 Pulse: 69   Resp: 16 SpO2: 99 % O2 Device: None (Room air)    Vital Signs with Ranges  Temp:  [98.2  F (36.8  C)-98.6  F (37  C)] 98.6  F (37  C)  Pulse:  [69-71] 69  Resp:  [16-18] 16  BP: (110-118)/(64-70) 118/70  SpO2:  [98 %-99 %] 99 %  163 lbs 0 oz    Constitutional: awake, alert, Ox3  Eyes: PERRL, sclera nonicteric  ENT: trachea midline  Respiratory: Lungs clear  Cardiovascular: Regular rate and rhythm, 2/6  diastolic murmur at the upper sternal border  GI: nondistended, nontender, bowel sounds present  Lymph/Hematologic: no lymphadenopathy  Skin: dry, no rash, abdominal pacemaker site has some bloody drainage on the bandage, no hematoma  Musculoskeletal: good muscle tone, strength 5/5 in upper and lower extremities  Neurologic: no focal deficits  Neuropsychiatric: appropriate affact    Intake/Output Summary (Last 24 hours) at 2/25/2023 0820  Last data filed at 2/25/2023 0400  Gross per 24 hour   Intake 880 ml   Output 500 ml   Net 380 ml     DATA:  Labs: Potassium 3.9, creatinine 0.6, hemoglobin 7.9, platelets 133, INR 1.6    Tele: Ventricular paced rhythm    Echo: HANK, February 24: EF 60%, normal RV function, mechanical tricuspid valve, no evidence of thrombus, mean gradient 12 mmHg,    Fluoroscopy of mechanical tricuspid valve yesterday showed minimal bileaflet movement, both leaflets appear fixed at about 45 degrees

## 2023-02-26 NOTE — PROGRESS NOTES
[facility]    Medicine Progress Note - Hospitalist Service    Date of Admission:  2/23/2023    Assessment & Plan   Silvia Otero is a 27 year old female with history of tricuspid valve replacement and third-degree AV block with pacemaker in [facility] admitted on 2/23/2023 for chest pressure.      Third-degree AV block s/p PPM 2016  *Patient had pacemaker placed with her TV surgery in [place] in 2016  *She presented with symptoms of chest pain, shortness of breath and lightheadedness for the past 1 week  * In ED noted to be in Aflutter with heart rate in the 170s and no pacer spikes.  * In person interrogation of device showed that device had reached ALLISON since last September.     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 Goal INR between 2.5-3.0  Echo obtained in the ED revealed possible mechanical valve thrombus> follow up HANK demonstrated restricted leaflet movement and increased gradient but not obvious thrombus     - cardiology considering CV surgery consult  - warfarin now with heparin bridging  - educated both patient and her  on need for INR monitoring and risk of a blood clot  - Hb lower today however no plans to hold heparin in light of possible thrombus     Atrial flutter  - now paced     Iron deficiency anemia  * Hgb 8.9 on admission.  MCV 76  * baseline unclear as patient recently migrated from [place]     - labs revealed iron deficiency anemia  - patient endorses hx of heavy periods and she does take iron at home for hx of anemia  - will give IV venofer  - monitor Hb  Increase  Trileptal to 150 mg 2 pills twice a day     In one week decrease cymbalta to 60 mg capsule every other day     Call if diarrhea persists or side effects daily and follow for any significant blood loss while patient endorses being on her period     Constipation  - scheduled bowel meds            Diet: Regular Diet Adult    DVT Prophylaxis: warfarin with bridging  Iglesias Catheter: Not present  Lines: None     Cardiac Monitoring: ACTIVE order. Indication: Post- EP procedure (48 hours)  Code Status: Full Code      Clinically Significant Risk Factors                                Disposition Plan      Expected Discharge Date: 02/28/2023        Discharge Comments: ? more eval of tricuspid valve restriction          Mary Hackett DO  Hospitalist Service  [facility]  Securely message with [employer] (more info)  Text page via [employer] Paging/Directory   ______________________________________________________________________    Interval History   Patient is very sleepy today. She denies many complaints and did have a BM yesterday. Pain at her PPM site. No cough. No SOB. She reports being on her period right now but it is not worse than normal    Physical Exam   Vital Signs: Temp: 98.6  F (37  C) Temp src: Oral BP: 118/70 Pulse: 69   Resp: 16 SpO2: 99 % O2 Device: None (Room air)    Weight: 163 lbs 0 oz    Constitutional: Awake, alert, cooperative, no apparent distress  Respiratory: Clear to auscultation bilaterally, no crackles or wheezing  Cardiovascular: Regular rate and rhythm, normal S1 and S2, and no murmur noted, noted PPM site with saturated dressing that is dried, painful to palpation  GI: Normal bowel sounds, soft, non-distended, non-tender  Skin/Integumen: No rashes, no cyanosis, no edema  Other:     Medical Decision Making       40 MINUTES SPENT BY ME on the date of service doing chart review, history, exam, documentation & further activities per the note.      Data     I have personally reviewed the following data over the past 24 hrs:    4.1  \   7.9 (L)   / 133 (L)     136 104 14.5 /  91   3.9 21 (L) 0.58 \       INR:  1.63 (H) PTT:  N/A    D-dimer:  N/A Fibrinogen:  N/A       Imaging results reviewed over the past 24 hrs:   No results found for this or any previous visit (from the past 24 hour(s)).

## 2023-02-27 ENCOUNTER — APPOINTMENT (OUTPATIENT)
Dept: CARDIOLOGY | Facility: CLINIC | Age: 28
DRG: 259 | End: 2023-02-27
Attending: INTERNAL MEDICINE
Payer: COMMERCIAL

## 2023-02-27 ENCOUNTER — DOCUMENTATION ONLY (OUTPATIENT)
Dept: CARDIOLOGY | Facility: CLINIC | Age: 28
End: 2023-02-27
Payer: COMMERCIAL

## 2023-02-27 DIAGNOSIS — Z95.0 CARDIAC PACEMAKER IN SITU: Primary | ICD-10-CM

## 2023-02-27 DIAGNOSIS — I44.2 COMPLETE HEART BLOCK (H): ICD-10-CM

## 2023-02-27 LAB
ERYTHROCYTE [DISTWIDTH] IN BLOOD BY AUTOMATED COUNT: 17.4 % (ref 10–15)
HCT VFR BLD AUTO: 26.1 % (ref 35–47)
HGB BLD-MCNC: 7.7 G/DL (ref 11.7–15.7)
INR PPP: 2.24 (ref 0.85–1.15)
MCH RBC QN AUTO: 22.3 PG (ref 26.5–33)
MCHC RBC AUTO-ENTMCNC: 29.5 G/DL (ref 31.5–36.5)
MCV RBC AUTO: 76 FL (ref 78–100)
PLATELET # BLD AUTO: 145 10E3/UL (ref 150–450)
RBC # BLD AUTO: 3.45 10E6/UL (ref 3.8–5.2)
UFH PPP CHRO-ACNC: 0.33 IU/ML
WBC # BLD AUTO: 4 10E3/UL (ref 4–11)

## 2023-02-27 PROCEDURE — 93325 DOPPLER ECHO COLOR FLOW MAPG: CPT | Mod: 26 | Performed by: INTERNAL MEDICINE

## 2023-02-27 PROCEDURE — 210N000002 HC R&B HEART CARE

## 2023-02-27 PROCEDURE — 258N000003 HC RX IP 258 OP 636: Performed by: STUDENT IN AN ORGANIZED HEALTH CARE EDUCATION/TRAINING PROGRAM

## 2023-02-27 PROCEDURE — 85610 PROTHROMBIN TIME: CPT | Performed by: INTERNAL MEDICINE

## 2023-02-27 PROCEDURE — 93321 DOPPLER ECHO F-UP/LMTD STD: CPT

## 2023-02-27 PROCEDURE — 99232 SBSQ HOSP IP/OBS MODERATE 35: CPT | Mod: 24 | Performed by: NURSE PRACTITIONER

## 2023-02-27 PROCEDURE — 36415 COLL VENOUS BLD VENIPUNCTURE: CPT | Performed by: STUDENT IN AN ORGANIZED HEALTH CARE EDUCATION/TRAINING PROGRAM

## 2023-02-27 PROCEDURE — 85027 COMPLETE CBC AUTOMATED: CPT | Performed by: STUDENT IN AN ORGANIZED HEALTH CARE EDUCATION/TRAINING PROGRAM

## 2023-02-27 PROCEDURE — 99231 SBSQ HOSP IP/OBS SF/LOW 25: CPT | Performed by: STUDENT IN AN ORGANIZED HEALTH CARE EDUCATION/TRAINING PROGRAM

## 2023-02-27 PROCEDURE — 85520 HEPARIN ASSAY: CPT | Performed by: STUDENT IN AN ORGANIZED HEALTH CARE EDUCATION/TRAINING PROGRAM

## 2023-02-27 PROCEDURE — 93321 DOPPLER ECHO F-UP/LMTD STD: CPT | Mod: 26 | Performed by: INTERNAL MEDICINE

## 2023-02-27 PROCEDURE — 93308 TTE F-UP OR LMTD: CPT | Mod: 26 | Performed by: INTERNAL MEDICINE

## 2023-02-27 PROCEDURE — 250N000013 HC RX MED GY IP 250 OP 250 PS 637: Performed by: HOSPITALIST

## 2023-02-27 PROCEDURE — 250N000013 HC RX MED GY IP 250 OP 250 PS 637: Performed by: STUDENT IN AN ORGANIZED HEALTH CARE EDUCATION/TRAINING PROGRAM

## 2023-02-27 PROCEDURE — 93325 DOPPLER ECHO COLOR FLOW MAPG: CPT

## 2023-02-27 PROCEDURE — 250N000011 HC RX IP 250 OP 636: Performed by: INTERNAL MEDICINE

## 2023-02-27 PROCEDURE — 250N000011 HC RX IP 250 OP 636: Performed by: STUDENT IN AN ORGANIZED HEALTH CARE EDUCATION/TRAINING PROGRAM

## 2023-02-27 RX ORDER — DIPHENHYDRAMINE HYDROCHLORIDE 50 MG/ML
25 INJECTION INTRAMUSCULAR; INTRAVENOUS EVERY 6 HOURS PRN
Status: DISCONTINUED | OUTPATIENT
Start: 2023-02-27 | End: 2023-03-02 | Stop reason: HOSPADM

## 2023-02-27 RX ORDER — DIPHENHYDRAMINE HCL 25 MG
25 CAPSULE ORAL EVERY 6 HOURS PRN
Status: DISCONTINUED | OUTPATIENT
Start: 2023-02-27 | End: 2023-03-02 | Stop reason: HOSPADM

## 2023-02-27 RX ORDER — WARFARIN SODIUM 7.5 MG/1
15 TABLET ORAL
Status: COMPLETED | OUTPATIENT
Start: 2023-02-27 | End: 2023-02-27

## 2023-02-27 RX ADMIN — IRON SUCROSE 300 MG: 20 INJECTION, SOLUTION INTRAVENOUS at 13:47

## 2023-02-27 RX ADMIN — WARFARIN SODIUM 15 MG: 7.5 TABLET ORAL at 19:53

## 2023-02-27 RX ADMIN — ACETAMINOPHEN 975 MG: 325 TABLET, FILM COATED ORAL at 05:51

## 2023-02-27 RX ADMIN — ACETAMINOPHEN 975 MG: 325 TABLET, FILM COATED ORAL at 13:46

## 2023-02-27 RX ADMIN — POLYETHYLENE GLYCOL 3350 17 G: 17 POWDER, FOR SOLUTION ORAL at 11:36

## 2023-02-27 RX ADMIN — DIPHENHYDRAMINE HYDROCHLORIDE 25 MG: 50 INJECTION, SOLUTION INTRAMUSCULAR; INTRAVENOUS at 15:06

## 2023-02-27 RX ADMIN — HEPARIN SODIUM 900 UNITS/HR: 10000 INJECTION, SOLUTION INTRAVENOUS at 13:48

## 2023-02-27 RX ADMIN — ACETAMINOPHEN 975 MG: 325 TABLET, FILM COATED ORAL at 21:17

## 2023-02-27 RX ADMIN — SENNOSIDES AND DOCUSATE SODIUM 1 TABLET: 50; 8.6 TABLET ORAL at 19:56

## 2023-02-27 ASSESSMENT — ACTIVITIES OF DAILY LIVING (ADL)
ADLS_ACUITY_SCORE: 28
ADLS_ACUITY_SCORE: 27
ADLS_ACUITY_SCORE: 28
ADLS_ACUITY_SCORE: 25
ADLS_ACUITY_SCORE: 25
ADLS_ACUITY_SCORE: 27
ADLS_ACUITY_SCORE: 27
ADLS_ACUITY_SCORE: 28
ADLS_ACUITY_SCORE: 27

## 2023-02-27 NOTE — CONSULTS
Care Management Initial Consult    General Information  Assessment completed with: PatientSilvia  Type of CM/SW Visit: Initial Assessment    Primary Care Provider verified and updated as needed: No (pt has no primary.  Sees provider at [facility].)   Readmission within the last 30 days:        Reason for Consult: other (see comments) (INR management/warfarin)  Advance Care Planning: Advance Care Planning Reviewed: no concerns identified          Communication Assessment  Patient's communication style: spoken language (English or Bilingual)    Hearing Difficulty or Deaf: no   Wear Glasses or Blind: yes    Cognitive  Cognitive/Neuro/Behavioral: WDL                      Living Environment:   People in home: spouse  Rachelhiaraseli  Current living Arrangements: apartment      Able to return to prior arrangements:         Family/Social Support:  Care provided by: self  Provides care for:    Marital Status:     Dionaraseli       Description of Support System: Supportive         Current Resources:   Patient receiving home care services: No     Community Resources: None  Equipment currently used at home: none  Supplies currently used at home:      Employment/Financial:  Employment Status: employed full-time        Financial Concerns: other (see comments) (initially had no insurance on file-FC consulted and insurance now listed)   Referral to Financial Worker: Yes       Lifestyle & Psychosocial Needs:  Social Determinants of Health     Tobacco Use: Not on file   Alcohol Use: Not on file   Financial Resource Strain: Not on file   Food Insecurity: Not on file   Transportation Needs: Not on file   Physical Activity: Not on file   Stress: Not on file   Social Connections: Not on file   Intimate Partner Violence: Not on file   Depression: Not on file   Housing Stability: Not on file       Functional Status:  Prior to admission patient needed assistance:              Mental Health Status:  Mental Health Status:  No Current Concerns       Chemical Dependency Status:  Chemical Dependency Status: No Current Concerns             Values/Beliefs:  Spiritual, Cultural Beliefs, Church Practices, Values that affect care:                 Additional Information:  Consult for assisting with anticoagulation follow up at discharge.  Met with patient and introduced self and reason for visit.  Pt shared that she has her INR checks and warfarin dosed at [facility] in [facility] and would like to continue with them after discharge.  Asked pt when she was last seen and unsure.  Asked pt if I could call and follow up and pt in agreement.      Called [facility] at [phone] and spoke with RN that stated pt last INR check was November that they have on file.  Last warfarin fill was in April on 2022.  Asked if provider still able to manage pt INR/warfarin after discharge and RN stated she needed to discuss with provider and would call RN CC back and let staff know after speaking with doctor.  Awaiting return call.      Updated hospitalist that working on follow up for INR.  Per hospitalist, pt needs INR check Thursday or Friday of this week if she discharged tomorrow.    Will need to schedule INR and have follow up plan in place prior to pt discharge.    Kalyani Galdamez RN, BS  Care Coordinator  [email or web address]

## 2023-02-27 NOTE — PROGRESS NOTES
[facility]    Medicine Progress Note - Hospitalist Service    Date of Admission:  2/23/2023    Assessment & Plan   Silvia Otero is a 27 year old female with history of tricuspid valve replacement and third-degree AV block with pacemaker in [facility] admitted on 2/23/2023 for chest pressure.      Third-degree AV block s/p PPM 2016  *Patient had pacemaker placed with her TV surgery in [place] in 2016  *She presented with symptoms of chest pain, shortness of breath and lightheadedness for the past 1 week  * In ED noted to be in Aflutter with heart rate in the 170s and no pacer spikes.  * In person interrogation of device showed that device had reached ALLISON since last September.     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 Goal INR between 2.5-3.0  Echo obtained in the ED revealed possible mechanical valve thrombus> follow up HANK demonstrated restricted leaflet movement and increased gradient but not obvious thrombus     - cardiology considering CV surgery consult  - warfarin now with heparin bridging, almost therapeutic today. Discharge later today vs tomorrow?  - educated both patient and her  on need for INR monitoring and risk of a blood clot  - monitor daily Hb, unable to hold AC     Atrial flutter  - now paced     Iron deficiency anemia  * Hgb 8.9 on admission.  MCV 76  * baseline unclear as patient recently migrated from [place]     - labs revealed iron deficiency anemia  - patient endorses hx of heavy periods and she does take iron at home for hx of anemia  - will give IV venofer x2 days     Constipation  - scheduled bowel meds             Diet: Regular Diet Adult    DVT Prophylaxis: warfarin with bridging  Iglesias Catheter: Not present  Lines: None     Cardiac Monitoring: ACTIVE order. Indication: Post- EP procedure (48 hours)  Code Status: Full Code      Clinically Significant Risk Factors                                Disposition Plan      Expected Discharge Date: 02/28/2023        Discharge Comments: ? more eval of tricuspid valve restriction          Mary Hackett,   Hospitalist Service  [facility]  Securely message with appsplit (more info)  Text page via "[employer]" Paging/Directory   ______________________________________________________________________    Interval History   Patient feeling better today overall. She is hopeful she can go home soon. NO longer on her period. Pain is better at her PPM site.     Physical Exam   Vital Signs: Temp: 97.7  F (36.5  C) Temp src: Oral BP: 124/74 Pulse: 75   Resp: 16 SpO2: 98 % O2 Device: None (Room air)    Weight: 162 lbs 4.6 oz    Constitutional: Awake, alert, cooperative, no apparent distress  Respiratory: Clear to auscultation bilaterally, no crackles or wheezing  Cardiovascular: Regular rate and rhythm no murmur  GI: Normal bowel sounds, soft, non-distended, non-tender  Skin/Integumen: No rashes, no cyanosis, no edema  Other:     Medical Decision Making       30 MINUTES SPENT BY ME on the date of service doing chart review, history, exam, documentation & further activities per the note.      Data     I have personally reviewed the following data over the past 24 hrs:    4.0  \   7.7 (L)   / 145 (L)     N/A N/A N/A /  N/A   N/A N/A N/A \       INR:  2.24 (H) PTT:  N/A   D-dimer:  N/A Fibrinogen:  N/A       Imaging results reviewed over the past 24 hrs:   No results found for this or any previous visit (from the past 24 hour(s)).

## 2023-02-27 NOTE — PROVIDER NOTIFICATION
1417 paged Dr. Hackett because pt is complaining of stuffy nose and itchy throat while getting IV iron infusion. Infusion was stopped and doctor paged.     1455 Dr. Hackett responded that it sounded like she was having a mild reaction to the iron infusion. Give IV benadryl and then complete the infusion.     1506 IV Benadryl given and IV iron restarted. Will continue to monitor closely and notify provider with any changes.

## 2023-02-27 NOTE — DISCHARGE INSTRUCTIONS
Discharge Instructions for Pacemaker Implantation  You have had a procedure to insert a pacemaker. Once inside your body, this small electronic device helps keep your heart from beating too slowly. A pacemaker can t fix existing heart problems. But it can help you feel better and have more energy. As you recover, follow all of the instructions you are given, including those below.  Activity  Follow the instructions you are given about limiting your activity.  Ask your doctor when you can expect to return to work.  - This was reviewed with Dr. Koch in the hospital and return to work letter was provided with a return to work date of 3/13/23.   You can still exercise. It s good for your body and your heart. Talk with your doctor about an exercise plan.  Other Precautions  Follow your doctor's directions carefully for wound care. You may remove the outer dressing in 3 - 4 days. Leave the steri-strips in place; these will be removed at your 1 week follow-up. Never put any creams, lotions, or products like peroxide on an incision unless your doctor tells you to.   You may shower once the outer dressing has been removed.   Before you receive any treatment, tell all health care providers (including your dentist) that you have a pacemaker.  You will be given an ID card that contains information about your pacemaker. Always carry this card with you. You can show this card if your pacemaker sets off a metal detector. You should also show it to avoid screening with a hand-held security wand.  Keep your cell phone away from your pacemaker. Don t carry the phone in your shirt pocket, even when it s turned off.  Avoid strong magnets. Examples are those used in MRIs or in hand-held security wands.  Avoid strong electrical fields. Examples are those made by radio transmitting towers,  ham  radios, and heavy-duty electrical equipment.  Avoid leaning over the open cui of a running car. A running engine creates an electrical field.  Most household and yard appliances will not cause any problems. If you use any large power tools, such as an industrial , talk with your doctor.   Follow-Up  Follow up in the device clinic as scheduled. You have an appointment scheduled for Monday, March 6th at 8:15am. Your appointment is at [facility] in [place], [address] [place] [address], [address], [place] [address].   Make regular follow-up appointments with your device clinic. They will check the pacemaker to make sure it s working properly.  When to Call Your Device Clinic [phone]  Call your doctor immediately if you have any of the following:  Dizziness  Chest pain  Lack of energy  Fainting spells  Twitching chest muscles  Rapid pulse or pounding heartbeat  Shortness of breath  Pain around your pacemaker  Fever above 100.4 F (38 C) or other signs of infection (redness, swelling, drainage, or warmth at the incision site)     9538-5682 [employer]. [address] [place] [address]. All rights reserved. This information is not intended as a substitute for professional medical care. Always follow your healthcare professional's instructions.    [facility] in [place]: [phone]  Device Clinic (Monday to Friday, 8am-4pm): [phone]  *The device clinic is closed on weekends and holidays.  Any calls received during this time will be answered on the next business  day. For any urgent questions after hours, please call the main clinic number and you will be put in contact with the cardiologist on call.          INR Clinic  [phone]-main phone number.  Can call with questions/concerns and ask to be connected with a nurse.

## 2023-02-27 NOTE — PLAN OF CARE
Goal Outcome Evaluation:    Shift Note - [id number]  Neuro: A&Ox4  CV: Tele - 100% V-paced. VSS.  Respiratory: WDL, on RA  GI/: Slight constipation, voiding spontaneously  Skin: Abd pacer dressing, intact with dried drainage  Activity: SBA, calls appropriately  Diet: Regular, tolerating well.  IV: PIV x2  Drips: Heparin ggt, running @ 900 units/hr. Anti Xa next AM.   BG: n/a  Pain: Abdominal incisional pain, managed well with scheduled Tylenol. Refused oxy and abdominal binder.  Other: NA  Consults: Cardiology  Plan: pain management, monitor Hgb, bridging between heparin ggt and warfarin, possible [facility] consult

## 2023-02-27 NOTE — PROGRESS NOTES
[facility]    Cardiology Progress Note    Date of Admission: 02/23/2023  Service Date: 02/27/2023    Summary:  Ms. 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 She was followed closely until 2001 when her tricuspid valve was replaced. In 2016, a new mechanical tricuspid valve was placed as she outgrew the old one. Single chamber (epicardial lead) St. Aniceto PPM was placed at time ov TVR. All the above was done in [facility]. She was admitted on 2/23/2023 for after she presented with chest pain, found to have a possible intracardiac thrombus around her mechanical valve. She was also seen by EP and underwent generator change for her PPM on 2/24/23.    Interval History   Patient is resting comfortably. No acute events overnight. States she's feeling better this morning than yesterday. Denies chest pain, shortness of breath, palpitations, or lower extremity edema.    Telemetry: V. Paced    Assessment:  1. Status post abdominal PPM generator replacement (Single chamber [epicardial lead] St. Aniceto PPM)  - Stable. Pain management, can use Percocet if needed  - Follow hemoglobin trend.     2. 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 Initial concern for thrombus on mechanical TV, but HANK 2/24/23 shows no evidence for thrombus but elevated mean gradient across the tricuspid valve at 12 mmHg at HR 63 bpm.  - Warfarin resumed with goal INR around 2.5, bridge with low intensity heparin protocol, no initial bolus    Plan:   1. Will need inpatient admission until INR is therapeutic, preferably closer to 2.5.  2. Dr. Blair noting may need CV surgery consult. Will discuss with Dr. Diego.   3. Cardiology will continue to follow along.    Thank you for the  opportunity to participate in this pleasant patient's care.     PORSCHE Forrester, CNP   Nurse Practitioner  [facility]  Pager: [phone]  Text Page or securely message via [employer] (8am - 5pm, M-F)    Patient Active Problem List   Diagnosis     Mural thrombus of heart     Physical Exam   Temp: 98.5  F (36.9  C) Temp src: Oral BP: 117/70 Pulse: 77   Resp: 16 SpO2: 97 % O2 Device: None (Room air)    Vitals:    02/24/23 0600 02/25/23 0500 02/27/23 0622   Weight: 74.6 kg (164 lb 6.4 oz) 73.9 kg (163 lb) 73.6 kg (162 lb 4.6 oz)     Vital Signs with Ranges  Temp:  [97.9  F (36.6  C)-98.6  F (37  C)] 98.5  F (36.9  C)  Pulse:  [72-80] 77  Resp:  [16-20] 16  BP: (111-125)/(70-78) 117/70  Cuff Mean (mmHg):  [98] 98  SpO2:  [97 %-100 %] 97 %  I/O last 3 completed shifts:  In: 240 [P.O.:240]  Out: -     General: Appears her stated age, well nourished, and in no acute distress.  Eyes: No scleral icterus.  HEENT: Neck supple. No JVD.  Cardiovascular: Regular rate and rhythm, normal S1 and S2. Grade 2/6 diastolic murmur at the upper sternal border. No rub or gallop.  Extremities: Moves all extremities well and symmetrically. There is no LE edema.  Respiratory: Breathing non-labored. Lungs clear to auscultation with no crackles or wheezes bilaterally.  Skin: No pallor or cyanosis.  Psych: Appropriate affect. Mentation normal.  Neurological: No gross motor neurological focal deficits.    Medications     heparin 900 Units/hr (02/26/23 1659)     - MEDICATION INSTRUCTIONS -         acetaminophen  975 mg Oral Q8H     iron sucrose  300 mg Intravenous Q24H     polyethylene glycol  17 g Oral Daily     sodium chloride (PF)  3 mL Intracatheter Q8H     Warfarin Therapy Reminder  1 each Oral See Admin Instructions     Recent Labs   Lab 02/27/23  0626 02/26/23  0550 02/25/23  0604   INR 2.24* 1.63* 1.75*      This note was completed in part using Dragon voice recognition software. Although reviewed after completion, some  word and grammatical errors may occur.

## 2023-02-27 NOTE — PROGRESS NOTES
Post St. Aniceto Generator Change 2/24/23 (Device in Abdomen) Instructions    Dressing:  Clean, dry, and intact  Lead Measurements per Device Rep:  WNL during procedure, see procedure note    Education Provided:  - Do not shower until outer occlusive dressing has been removed.  - Remove outer dressing in 3 - 4 days.  - Leave steri-strips in place, these will be removed at the 1 week check.   - Call Device Clinic with increased swelling, drainage, or fever > 101 degrees.   - Follow-up with the Device Clinic as scheduled.  1 week scheduled for 3/6/34.    Pt verbalized understanding of instructions and AVS updated.  Patient requested a return to work note with an expected return date of 3/13/23.  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  Reviewed with Dr. Koch who agreed with return to work on 3/13/23.  Letter provided to patient.     DAXA Alexander

## 2023-02-27 NOTE — PLAN OF CARE
Goal Outcome Evaluation:    Shift Note - Assumed cares [id number]  Neuro: A&Ox4, sleepy  CV: Tele - 100% V-paced. VSS.  Respiratory: WDL, on RA  GI/: Slight constipation, voiding spontaneously  Skin: Abd pacer dressing, intact with dried drainage  Activity: SBA, calls appropriately  Diet: Regular, tolerating well.  IV: PIV x2  Drips: Heparin ggt, running @ 900 units/hr  BG: n/a  Pain: Abdominal incisional pain, managed well with scheduled Tylenol. Refused oxy and abdominal binder.  Other: Received IV venofer today, pt c/o flu-like sx (runny nose, slight congestion) that started same time as infusion.  Consults: Cardiology  Plan: pain management, monitor Hgb, bridging between heparin ggt and warfarin, possible [facility] consult

## 2023-02-28 ENCOUNTER — APPOINTMENT (OUTPATIENT)
Dept: GENERAL RADIOLOGY | Facility: CLINIC | Age: 28
DRG: 259 | End: 2023-02-28
Attending: INTERNAL MEDICINE
Payer: COMMERCIAL

## 2023-02-28 LAB
ERYTHROCYTE [DISTWIDTH] IN BLOOD BY AUTOMATED COUNT: 17.5 % (ref 10–15)
HCT VFR BLD AUTO: 26.5 % (ref 35–47)
HGB BLD-MCNC: 7.6 G/DL (ref 11.7–15.7)
INR PPP: 2.8 (ref 0.85–1.15)
MCH RBC QN AUTO: 22.2 PG (ref 26.5–33)
MCHC RBC AUTO-ENTMCNC: 28.7 G/DL (ref 31.5–36.5)
MCV RBC AUTO: 77 FL (ref 78–100)
PLATELET # BLD AUTO: 138 10E3/UL (ref 150–450)
RBC # BLD AUTO: 3.43 10E6/UL (ref 3.8–5.2)
UFH PPP CHRO-ACNC: 0.33 IU/ML
WBC # BLD AUTO: 3.6 10E3/UL (ref 4–11)

## 2023-02-28 PROCEDURE — 85027 COMPLETE CBC AUTOMATED: CPT | Performed by: INTERNAL MEDICINE

## 2023-02-28 PROCEDURE — 210N000002 HC R&B HEART CARE

## 2023-02-28 PROCEDURE — 250N000013 HC RX MED GY IP 250 OP 250 PS 637: Performed by: HOSPITALIST

## 2023-02-28 PROCEDURE — 85610 PROTHROMBIN TIME: CPT | Performed by: INTERNAL MEDICINE

## 2023-02-28 PROCEDURE — 85520 HEPARIN ASSAY: CPT | Performed by: INTERNAL MEDICINE

## 2023-02-28 PROCEDURE — 99222 1ST HOSP IP/OBS MODERATE 55: CPT | Mod: GC | Performed by: STUDENT IN AN ORGANIZED HEALTH CARE EDUCATION/TRAINING PROGRAM

## 2023-02-28 PROCEDURE — 76000 FLUOROSCOPY <1 HR PHYS/QHP: CPT

## 2023-02-28 PROCEDURE — 250N000013 HC RX MED GY IP 250 OP 250 PS 637: Performed by: STUDENT IN AN ORGANIZED HEALTH CARE EDUCATION/TRAINING PROGRAM

## 2023-02-28 PROCEDURE — 36415 COLL VENOUS BLD VENIPUNCTURE: CPT | Performed by: INTERNAL MEDICINE

## 2023-02-28 PROCEDURE — 99232 SBSQ HOSP IP/OBS MODERATE 35: CPT | Performed by: STUDENT IN AN ORGANIZED HEALTH CARE EDUCATION/TRAINING PROGRAM

## 2023-02-28 PROCEDURE — 99233 SBSQ HOSP IP/OBS HIGH 50: CPT | Mod: 24 | Performed by: NURSE PRACTITIONER

## 2023-02-28 RX ORDER — WARFARIN SODIUM 5 MG/1
10 TABLET ORAL
Status: COMPLETED | OUTPATIENT
Start: 2023-02-28 | End: 2023-02-28

## 2023-02-28 RX ADMIN — ACETAMINOPHEN 975 MG: 325 TABLET, FILM COATED ORAL at 21:15

## 2023-02-28 RX ADMIN — POLYETHYLENE GLYCOL 3350 17 G: 17 POWDER, FOR SOLUTION ORAL at 09:37

## 2023-02-28 RX ADMIN — ACETAMINOPHEN 975 MG: 325 TABLET, FILM COATED ORAL at 06:02

## 2023-02-28 RX ADMIN — WARFARIN SODIUM 10 MG: 5 TABLET ORAL at 17:20

## 2023-02-28 RX ADMIN — ACETAMINOPHEN 975 MG: 325 TABLET, FILM COATED ORAL at 13:46

## 2023-02-28 ASSESSMENT — ACTIVITIES OF DAILY LIVING (ADL)
ADLS_ACUITY_SCORE: 25

## 2023-02-28 NOTE — CONSULTS
Cardiac Surgery    Consult Note     Silvia Otero  Code Status: Full Code  Primary Care Physician: No Ref-Primary, Physician   : 1995   Age: 27 year old     Date of Service: 2023     Subjective     Chief Complaint: Chest Pain    History of present illness:     28 y/o who has history of multiple sternotomies, starting at age of [age] for right atrial myxoma requirinng resection involving TV in . She then had TR requiring replacement in . She then outgrew this valve and most recently had a mechanical TV replacement in 2016. During these surgeries she had heart block at some point and had a single epicardial lead placed.     She is admitted now for chest pains and after generator change for her PPM a few days ago by EP.  Her INR was subtherapeutic on admission at 2.1 and she has been noted to have severe tricuspid stenosis with valve thrombosis. She reports her only symptoms were light headedness and chest pain which are now resolved. She has had no right heart failure symptoms otherwise.     Subjective   Review of Systems:   General- denies any fevers, chills, or night sweats  HEENT- denies headache, change in vision or change in hearing, sore throat, or rhinorrhea  Cardiac- had chest pain  Pulmonary- denies shortness of breath  GI- denies any abd pain, nausea, vomiting, constipation, or diarrhea, no hematochezia  - denies any dysuria, hematuria, or urinary changes  Skin- denies any skin rashes or skin changes  MSK- denies any muscle pains  Psych- denies any suicidal or homicidal ideation  Neuro- denies dizziness, syncope, or tremors  Endocrine- denies heat or cold intolerance     Patient Active Problem List   Diagnosis     Mural thrombus of heart     No past medical history on file.  Past Surgical History:   Procedure Laterality Date     EP PACEMAKER GENERATOR REPLACEMENT- DUAL N/A 2023    Procedure: Pacemaker Generator Replacement Dual;  Surgeon: Jodron Regan MD;  Location:   "[facility]     Social History     Socioeconomic History     Marital status:      Spouse name: Not on file     Number of children: Not on file     Years of education: Not on file     Highest education level: Not on file   Occupational History     Not on file   Tobacco Use     Smoking status: Not on file     Smokeless tobacco: Not on file   Substance and Sexual Activity     Alcohol use: Not on file     Drug use: Not on file     Sexual activity: Not on file   Other Topics Concern     Not on file   Social History Narrative     Not on file     Social Determinants of Health     Financial Resource Strain: Not on file   Food Insecurity: Not on file   Transportation Needs: Not on file   Physical Activity: Not on file   Stress: Not on file   Social Connections: Not on file   Intimate Partner Violence: Not on file   Housing Stability: Not on file     No family history on file.  Medications Prior to Admission   Medication Sig Dispense Refill Last Dose     acetaminophen (TYLENOL) 325 MG tablet Take 650 mg by mouth daily as needed for mild pain   2/21/2023     warfarin ANTICOAGULANT (COUMADIN) 10 MG tablet Take by mouth See Admin Instructions 10 mg on Mondays thru Thursdays, then 15 mg Fridays thru Sundays.   2/22/2023 at 10 mg at 2300     No Known Allergies       Objective   Objective   /69 (BP Location: Left arm)   Pulse 73   Temp 98.3  F (36.8  C) (Oral)   Resp 18   Ht 1.753 m (5' 9\")   Wt 74.9 kg (165 lb 1.6 oz)   SpO2 98%   BMI 24.38 kg/m      Temp  Min: 98.3  F (36.8  C)  Max: 98.6  F (37  C)  BP  Min: 106/58  Max: 123/80     Intake/Output Summary (Last 24 hours) at 2/28/2023 1249  Last data filed at 2/28/2023 0930  Gross per 24 hour   Intake 100 ml   Output --   Net 100 ml     No intake/output data recorded.     Physical Exam:   Gen- alert and oriented x3, NAD  HEENT- NC/AT, EOMI  Cardiac- RRR, paced  Pulm- normal respiratory effort  Abd- soft, NT/ND, no rebound or guarding  - " deferred  Extremities- no peripheral edema  Neuro- gross motor intact  Skin- no obvious rashes, bruises, or cuts     Pertinent Labs: Reviewed.     Arterial Blood Gases   No lab results found in last 7 days.    Complete Blood Count   Recent Labs   Lab 23  0705 23  0626 23  0550 23  1114   WBC 3.6* 4.0 4.1 4.2   HGB 7.6* 7.7* 7.9* 8.9*   * 145* 133* 178       Basic Metabolic Panel  Recent Labs   Lab 23  0550 23  0604 23  1428    140 138   POTASSIUM 3.9 3.6 4.0   CHLORIDE 104 107 103   CO2    BUN 14.5 11.4 14.2   CR 0.58 0.69 0.65       Liver Function Tests  Recent Labs   Lab 23  0705 23  0626 23  0550 23  0604 23  1141 23  0045   AST  --   --   --   --   --  27   ALT  --   --   --   --   --  14   ALKPHOS  --   --   --   --   --  72   BILITOTAL  --   --   --   --   --  0.5   ALBUMIN  --   --   --   --   --  4.6   INR 2.80* 2.24* 1.63* 1.75*   < >  --     < > = values in this interval not displayed.       Coagulation Profile  Recent Labs   Lab 23  0705 23  0626 23  0550 23  0604   INR 2.80* 2.24* 1.63* 1.75*          Pertinent Imaging (Last 24 hours):  Recent Results (from the past 24 hour(s))   Echocardiogram Limited    Narrative    [id number]  [id number]  [id number]  [id number]^TAMIKO^RENÉE     [facility]  Echocardiography Laboratory  [address] [place] [id number]     Name: EDDY AKBAR  MRN: [id number]  : 1995  Study Date: 2023 04:26 PM  Age: 27 yrs  Gender: Female  Patient Location: [facility]  Reason For Study: Prosthetic Valve Dysfunction  Ordering Physician: RENÉE MORRISON  Referring Physician: Kathy Ref-Primary, Physician  Performed By: Kimber Coates     BSA: 1.9 m2  Height: 69 in  Weight: 162 lb  HR: 72  BP: 106/58 mmHg  ______________________________________________________________________________  Procedure  Limited Portable Echo  Adult.  ______________________________________________________________________________  Interpretation Summary     Limited echo, only evaluate gradient across TV - Per Dr Diego     There is severe tricuspid stenosis. MG is 15 mm Hg with elevated PHT  consistent with severe mechanical prosthetic valve dysfunction as previously  noted.  ______________________________________________________________________________  Left Ventricle  Left ventricular systolic function is normal.     Right Ventricle  The right ventricle is not well visualized. Mildly decreased right ventricular  systolic function.     Mitral Valve  The mitral valve is normal in structure and function.     Tricuspid Valve  There is severe tricuspid stenosis.     Aortic Valve  The aortic valve is normal in structure and function.  ______________________________________________________________________________  Report approved by: Brandon Grossman 02/27/2023 04:50 PM     ______________________________________________________________________________      XR Chest/Heart Fluoro    Narrative    CHEST/HEART FLUORO  2/28/2023 9:18 AM       INDICATION: Chest pressure.    COMPARISON: Chest x-ray 2/23/2023       Impression    IMPRESSION: 0.2 minutes fluoroscopy utilized. No spot images.    Mild elevation of the right hemidiaphragm. There is normal  diaphragmatic excursion bilaterally. No evidence of diaphragmatic  paralysis.       Last Echo:  Echo result w/o MOPS: Interpretation Summary Limited echo, only evaluate gradient across TV - Per Dr Diego There is severe tricuspid stenosis. MG is 15 mm Hg with elevated PHTconsistent with severe mechanical prosthetic valve dysfunction as previouslynoted.               Current Medications     S  C  H  E  D   acetaminophen  975 mg Oral Q8H     polyethylene glycol  17 g Oral Daily     sodium chloride (PF)  3 mL Intracatheter Q8H     Warfarin Therapy Reminder  1 each Oral See Admin Instructions      G  T  T   - MEDICATION  INSTRUCTIONS -        P  R  N diphenhydrAMINE **OR** diphenhydrAMINE, HOLD MEDICATION, HOLD MEDICATION, HOLD MEDICATION, lidocaine 4%, lidocaine (buffered or not buffered), melatonin, naloxone **OR** naloxone **OR** naloxone **OR** naloxone, ondansetron **OR** ondansetron, oxyCODONE-acetaminophen, - MEDICATION INSTRUCTIONS -, senna-docusate **OR** senna-docusate, sodium chloride (PF)         Assessment     27 year old female with history of multiple cardiac surgeries most recently have mechanical tricuspid valve replacement in 2016 in the setting of having had CHB and has PPM with epricardial lead who is noted to have symptomatic mechanical triscupid stenosis with thrombus     Plan     - recommend attempting continued therapeutic anticoagulation +/- lytic therapy before deciding that she has failed conservative measures and subjecting her to multiple redo sternotomy  - at this time, no surgical intervention planned  - given she has no symptoms, pending the decision regarding lytics, we would plan to follow-up with this patient as an outpatient with repeat ECHO to determine if medical therapy has helped  - rest of cares per primary and cardiology team       Signed:    Shadi Jones MD 2/28/2023 at 12:49 PM  Cardiothoracic Surgery Fellow  Pager: [phone]

## 2023-02-28 NOTE — PLAN OF CARE
VS: Stable on RA   Assist by: SB Assist    Cardiac: WDL; CP free overnight  Neuro: A&Ox4  CMS: Denies paresthesias  Respi: WDL  : Continent  GI: Abdo soft, mild tenderness reported; BNO 2/27; given senna and tylenol; BS audible    Strip/Tele: V-paced    Pressure areas/Skin: Abdominal incision with intact dressing (dried drainage); Scar to anterior mid-chest    LDA's: PIVC to Right arm; On heparin 900u''/hr      Plans:  > Hep Xa in am  > CV Surgery consult  >Warfarin-Heparin bridging  > +/- discharge today    !! IV iron infusion precaution; refer to am RN notes    Problem: Dysrhythmia  Goal: Normalized Cardiac Rhythm  Outcome: Progressing     Problem: Plan of Care - These are the overarching goals to be used throughout the patient stay.    Goal: Absence of Hospital-Acquired Illness or Injury  Intervention: Identify and Manage Fall Risk  Recent Flowsheet Documentation  Taken 2/28/2023 0200 by Ave Live, RN  Safety Promotion/Fall Prevention:    clutter free environment maintained    increase visualization of patient    safety round/check completed  Taken 2/27/2023 2000 by Ave Live, RN  Safety Promotion/Fall Prevention:    activity supervised    chair alarm on

## 2023-02-28 NOTE — PROGRESS NOTES
[facility]    Cardiology Progress Note    Date of Admission: 02/23/2023  Service Date: 02/28/2023    Summary:  Ms. 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 She was followed closely until 2001 when her tricuspid valve was replaced. In 2016, a new mechanical tricuspid valve was placed as she outgrew the old one. Single chamber (epicardial lead) St. Aniceto PPM was placed at time ov TVR. All the above was done in [facility]. She was admitted on 2/23/2023 for after she presented with chest pain, found to have a possible intracardiac thrombus around her mechanical valve. She was also seen by EP and underwent generator change for her PPM on 2/24/23.    Interval History   Patient is resting comfortably. No acute events overnight. Denies chest pain, shortness of breath, palpitations, or lower extremity edema. We reviewed her echocardiogram findings and the plan of care as outlined below.     Telemetry: V. Paced    Assessment:  1. Status post abdominal PPM generator replacement (Single chamber [epicardial lead] St. Aniceto PPM)  - Stable. Pain management, can use Percocet if needed  - Follow hemoglobin trend.     2. 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 Initial concern for thrombus on mechanical TV, but HANK 2/24/23 shows no evidence for thrombus but elevated mean gradient across the tricuspid valve at 12 mmHg at HR 63 bpm.  - Warfarin resumed with goal INR around 2.5, bridged with low intensity heparin protocol. INR now at 2.8 so heparin stopped and continuing warfarin alone.  - Repeat limited TTE 2/27 still showing severe TR with mean gradient of 15 mmHg.    Plan:   1. Case discussed with Dr. Diego. CV surgery consult placed given severe mechanical  tricuspid valve stenosis.   2. Heparin stopped. Continue anticoagulation with warfarin with target INR around 2.5.   3. Cardiology will continue to follow along.    Thank you for the opportunity to participate in this pleasant patient's care.     PORSCHE Forrester, CNP   Nurse Practitioner  [facility]  Pager: [phone]  Text Page or securely message via [employer] (8am - 5pm, M-F)    Patient Active Problem List   Diagnosis     Mural thrombus of heart     Physical Exam   Temp: 98.3  F (36.8  C) Temp src: Oral BP: 119/69 Pulse: 73   Resp: 18 SpO2: 98 % O2 Device: None (Room air)    Vitals:    02/25/23 0500 02/27/23 0622 02/28/23 0615   Weight: 73.9 kg (163 lb) 73.6 kg (162 lb 4.6 oz) 74.9 kg (165 lb 1.6 oz)     Vital Signs with Ranges  Temp:  [98.3  F (36.8  C)-98.6  F (37  C)] 98.3  F (36.8  C)  Pulse:  [72-76] 73  Resp:  [16-18] 18  BP: (106-123)/(58-80) 119/69  SpO2:  [98 %-100 %] 98 %  No intake/output data recorded.    General: Appears her stated age, well nourished, and in no acute distress.  Eyes: No scleral icterus.  HEENT: Neck supple. No JVD.  Cardiovascular: Regular rate and rhythm, normal S1 and S2. Grade 2/6 diastolic murmur at the upper sternal border. No rub or gallop.  Extremities: Moves all extremities well and symmetrically. There is no LE edema.  Respiratory: Breathing non-labored. Lungs clear to auscultation with no crackles or wheezes bilaterally.  Skin: No pallor or cyanosis.  Psych: Appropriate affect. Mentation normal.  Neurological: No gross motor neurological focal deficits.    Medications     - MEDICATION INSTRUCTIONS -         acetaminophen  975 mg Oral Q8H     polyethylene glycol  17 g Oral Daily     sodium chloride (PF)  3 mL Intracatheter Q8H     Warfarin Therapy Reminder  1 each Oral See Admin Instructions     Recent Labs   Lab 02/28/23  0705 02/27/23  0626 02/26/23  0550   INR 2.80* 2.24* 1.63*      This note was completed in part using Dragon voice recognition  software. Although reviewed after completion, some word and grammatical errors may occur.

## 2023-02-28 NOTE — PLAN OF CARE
This encounter was created in error - please disregard.   VSS. Tele: 100 v-paced. PPM  to touch. Tylenol given. See MD note regarding iron infusion. Possible discharge tomorrow pending cardiology thinking if pt needs CV surgery to see pt. Echo competed- severe valuve dysfunction.

## 2023-02-28 NOTE — PROGRESS NOTES
Care Management Follow Up    Length of Stay (days): 5    Expected Discharge Date: 03/01/2023     Concerns to be Addressed:       Patient plan of care discussed at interdisciplinary rounds: Yes    Anticipated Discharge Disposition: Home     Anticipated Discharge Services: None  Anticipated Discharge DME:      Patient/family educated on Medicare website which has current facility and service quality ratings:    Education Provided on the Discharge Plan:    Patient/Family in Agreement with the Plan: yes    Referrals Placed by CM/SW:    Private pay costs discussed: Not applicable    Additional Information:  Met with pt again to discuss discharge planning and management/follow up of warfarin and INR.  Pt would like to continue to see Dr. Gonzales at [facility] in [facility] ([phone]).  Called [facility] and spoke with RN care coordinator, Rosalinda, in regards to provider managing pt INR/warfarin at discharge.  Per Rosalinda, Dr Gonzales can manage this for patient.  Discussed having care coordinator at clinic assist with making sure pt has follow up appointments scheduled for close follow up on INR and Rosalinda stated she would be the one to do this and will follow after pt discharges.  Requested copy of discharge summary to be faxed to [phone] when pt discharges.    Appointment scheduled for Friday March 3rd at 11:40am for INR/follow up, in anticipation that pt will discharge tomorrow.    Kalyani Galdamez RN, BS  Care Coordinator  [email or web address]

## 2023-02-28 NOTE — PROGRESS NOTES
[facility]    Medicine Progress Note - Hospitalist Service    Date of Admission:  2/23/2023    Assessment & Plan   Silvia Otero is a 27 year old female with history of tricuspid valve replacement and third-degree AV block with pacemaker in [facility] admitted on 2/23/2023 for chest pressure.      Third-degree AV block s/p PPM 2016  *Patient had pacemaker placed with her repeat TV surgery in [place] in 2016  *She presented with symptoms of chest pain, shortness of breath and lightheadedness for the past 1 week.   * In ED noted to be in Aflutter with heart rate in the 170s and no pacer spikes.  * In person interrogation of device showed that device had reached ALLISON since last September.     - underwent generator replacement with EP 02/24, remains 100% paced  - follow up in clinic next week with EP  - Hb stable     Possible Mechanical tricuspid valve thrombus  History of tricuspid valve replacement on anticoagulation with poor compliance  Subtherapeutic INR on admission [2.1]  Severe mechanical tricuspid stenosis  PTA on Coumadin however since admission it bcame clear that patient has not had an INR draw in many months. Goal INR between 2.5-3.0  *Echo obtained in the ED revealed possible tricuspid mechanical valve thrombus> follow up HANK demonstrated restricted leaflet movement and increased gradient but not obvious thrombus     - repeat echo yesterday with therapeutic INR continues to demonstrate increased gradients and severe stenosis, cardiology placed CV surgery consult today  - stop heparin and continue warfarin with pharmacy to dose INR  -  assisting with outpatient INR follow up, by their report patient last had follow up 10/2022  - educated both patient and her  on need for INR monitoring and risk of a blood clot  - monitor daily Hb, unable to hold AC     Atrial flutter  - now paced     Iron deficiency anemia  * Hgb 8.9 on admission.  MCV 76  * baseline unclear as patient  [place] from [place]     - labs revealed iron deficiency anemia on admission and patient endorses hx of heavy periods  - s/p 2 transfusions of IV venofer  - monitor Hb daily     Constipation  - scheduled bowel meds            Diet: Regular Diet Adult    DVT Prophylaxis: warfarin with bridging  Iglesias Catheter: Not present  Lines: None     Cardiac Monitoring: ACTIVE order. Indication: Post- EP procedure (48 hours)  Code Status: Full Code      Clinically Significant Risk Factors                                Disposition Plan      Expected Discharge Date: 03/01/2023      Destination: home  Discharge Comments: ? more eval of tricuspid valve restriction          Mary Hackett DO  Hospitalist Service  [facility]  Securely message with [employer] (more info)  Text page via [employer] Paging/Directory   ______________________________________________________________________    Interval History   Patient denies many complaints today. She really wants to go home but understands the next steps. She denies LE edema or heart failure symptoms. She is having BMs. I reviewed her echo report and had long conversation with patient at bedside today.    Physical Exam   Vital Signs: Temp: 98.3  F (36.8  C) Temp src: Oral BP: 119/69 Pulse: 73   Resp: 18 SpO2: 98 % O2 Device: None (Room air)    Weight: 165 lbs 1.6 oz    Constitutional: Awake, alert, cooperative, no apparent distress  Respiratory: Clear to auscultation bilaterally, no crackles or wheezing  Cardiovascular: Regular rate and rhythm no murmur  GI: Normal bowel sounds, soft, non-distended, non-tender  Skin/Integumen: No rashes, no cyanosis, no edema  Other:     Medical Decision Making       45 MINUTES SPENT BY ME on the date of service doing chart review, history, exam, documentation & further activities per the note.      Data     I have personally reviewed the following data over the past 24 hrs:    3.6 (L)  \   7.6 (L)   / 138 (L)     N/A  N/A N/A /  N/A   N/A N/A N/A \       INR:  2.80 (H) PTT:  N/A   D-dimer:  N/A Fibrinogen:  N/A       Imaging results reviewed over the past 24 hrs:   Recent Results (from the past 24 hour(s))   Echocardiogram Limited    Narrative    [id number]  [id number]  [id number]  [id number]^TAMIKO^RENÉE     [facility]  Echocardiography Laboratory  [address] [place] [address]     Name: EDDY AKBAR  MRN: [id number]  : 1995  Study Date: 2023 04:26 PM  Age: 27 yrs  Gender: Female  Patient Location: [facility]  Reason For Study: Prosthetic Valve Dysfunction  Ordering Physician: RENÉE DIEGO  Referring Physician: Kathy Ref-Primary, Physician  Performed By: Kimber Coates     BSA: 1.9 m2  Height: 69 in  Weight: 162 lb  HR: 72  BP: 106/58 mmHg  ______________________________________________________________________________  Procedure  Limited Portable Echo Adult.  ______________________________________________________________________________  Interpretation Summary     Limited echo, only evaluate gradient across TV - Per Dr Diego     There is severe tricuspid stenosis. MG is 15 mm Hg with elevated PHT  consistent with severe mechanical prosthetic valve dysfunction as previously  noted.  ______________________________________________________________________________  Left Ventricle  Left ventricular systolic function is normal.     Right Ventricle  The right ventricle is not well visualized. Mildly decreased right ventricular  systolic function.     Mitral Valve  The mitral valve is normal in structure and function.     Tricuspid Valve  There is severe tricuspid stenosis.     Aortic Valve  The aortic valve is normal in structure and function.  ______________________________________________________________________________  Report approved by: Brandon Grossman 2023 04:50 PM     ______________________________________________________________________________

## 2023-03-01 LAB
ERYTHROCYTE [DISTWIDTH] IN BLOOD BY AUTOMATED COUNT: 18.7 % (ref 10–15)
HCT VFR BLD AUTO: 24.6 % (ref 35–47)
HGB BLD-MCNC: 7.3 G/DL (ref 11.7–15.7)
INR PPP: 3.27 (ref 0.85–1.15)
MCH RBC QN AUTO: 22.7 PG (ref 26.5–33)
MCHC RBC AUTO-ENTMCNC: 29.7 G/DL (ref 31.5–36.5)
MCV RBC AUTO: 77 FL (ref 78–100)
PLATELET # BLD AUTO: 145 10E3/UL (ref 150–450)
RBC # BLD AUTO: 3.21 10E6/UL (ref 3.8–5.2)
WBC # BLD AUTO: 4.2 10E3/UL (ref 4–11)

## 2023-03-01 PROCEDURE — 99232 SBSQ HOSP IP/OBS MODERATE 35: CPT | Performed by: INTERNAL MEDICINE

## 2023-03-01 PROCEDURE — 36415 COLL VENOUS BLD VENIPUNCTURE: CPT | Performed by: INTERNAL MEDICINE

## 2023-03-01 PROCEDURE — 99291 CRITICAL CARE FIRST HOUR: CPT | Mod: 24 | Performed by: NURSE PRACTITIONER

## 2023-03-01 PROCEDURE — 99255 IP/OBS CONSLTJ NEW/EST HI 80: CPT | Performed by: INTERNAL MEDICINE

## 2023-03-01 PROCEDURE — 85027 COMPLETE CBC AUTOMATED: CPT | Performed by: STUDENT IN AN ORGANIZED HEALTH CARE EDUCATION/TRAINING PROGRAM

## 2023-03-01 PROCEDURE — 250N000013 HC RX MED GY IP 250 OP 250 PS 637: Performed by: HOSPITALIST

## 2023-03-01 PROCEDURE — 85610 PROTHROMBIN TIME: CPT | Performed by: INTERNAL MEDICINE

## 2023-03-01 PROCEDURE — 250N000013 HC RX MED GY IP 250 OP 250 PS 637: Performed by: STUDENT IN AN ORGANIZED HEALTH CARE EDUCATION/TRAINING PROGRAM

## 2023-03-01 PROCEDURE — 210N000002 HC R&B HEART CARE

## 2023-03-01 RX ORDER — WARFARIN SODIUM 7.5 MG/1
7.5 TABLET ORAL
Status: COMPLETED | OUTPATIENT
Start: 2023-03-01 | End: 2023-03-01

## 2023-03-01 RX ADMIN — ACETAMINOPHEN 975 MG: 325 TABLET, FILM COATED ORAL at 15:07

## 2023-03-01 RX ADMIN — WARFARIN SODIUM 7.5 MG: 7.5 TABLET ORAL at 18:14

## 2023-03-01 RX ADMIN — ACETAMINOPHEN 975 MG: 325 TABLET, FILM COATED ORAL at 05:35

## 2023-03-01 RX ADMIN — ACETAMINOPHEN 975 MG: 325 TABLET, FILM COATED ORAL at 21:53

## 2023-03-01 RX ADMIN — POLYETHYLENE GLYCOL 3350 17 G: 17 POWDER, FOR SOLUTION ORAL at 10:36

## 2023-03-01 ASSESSMENT — ACTIVITIES OF DAILY LIVING (ADL)
ADLS_ACUITY_SCORE: 25

## 2023-03-01 NOTE — PLAN OF CARE
A&Ox4, denies CP or SOB. C/o sore throat treated with tylenol. Tele Vpaced with underlying Aflutter. Bmx1 today. Independent in room. Heparin stopped, continue coumadin. CV surgery consult complete, no surgical intervention now. Hematology consult pending. Discharge home next 1-2 days.

## 2023-03-01 NOTE — PROGRESS NOTES
[facility]    Cardiology Progress Note    Date of Admission: 02/23/2023  Service Date: 03/01/2023    Summary:  Ms. 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 She was followed closely until 2001 when her tricuspid valve was replaced. In 2016, a new mechanical tricuspid valve was placed as she outgrew the old one. Single chamber (epicardial lead) St. Aniceto PPM was placed at time ov TVR. All the above was done in [facility]. She was admitted on 2/23/2023 for after she presented with chest pain, found to have a possible intracardiac thrombus around her mechanical valve. She was also seen by EP and underwent generator change for her PPM on 2/24/23.    Interval History   Patient is resting comfortably. No acute events overnight. Denies chest pain, shortness of breath, palpitations, or lower extremity edema. We reviewed the plan of care as outlined below. All questions were answered.    Telemetry: V. Paced    Assessment:  1. Status post abdominal PPM generator replacement (Single chamber [epicardial lead] St. Aniceto PPM)     2. 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 Initial concern for thrombus on mechanical TV, but HANK 2/24/23 shows no evidence for thrombus but elevated mean gradient across the tricuspid valve at 12 mmHg at HR 63 bpm. Valve fluoroscopy on 2/24 showed restricted valve motion.  - Warfarin resumed with goal INR around 2.5, bridged with low intensity heparin protocol until reaching therapeutic INR now at 2.8 so heparin stopped and continuing warfarin alone.  - Repeat limited TTE 2/27 still showing severe TR with mean gradient of 15 mmHg.   - CV surgery consulted and recommend consideration of lytics and a higher INR goal to treat potential mechanical  valve thrombosis.    Plan:   1. Hematology/Oncology consulted for further recommendations regarding consideration for lytics or higher INR goal possible mechanical tricuspid valve thrombosis.   2. Continue anticoagulation with warfarin. INR up from 2.8 to 3.27 today.  3. If no improvement in tricuspid stenosis and mechanical valve function with trial of lytics or higher INR target, may need to refer to adult congenital cardiology team at [facility] for further guidance and consideration of re-do tricuspid valve repair. Could likely do this as outpatient and possibly discharge today pending heme/onc discussion regarding lytics/INR target since she is stable with no signs/symptoms of heart failure.   4. Cardiology will continue to follow along.     Thank you for the opportunity to participate in this pleasant patient's care.     PORSCHE Forrester, CNP   Nurse Practitioner  [facility]  Pager: [phone]  Text Page or securely message via [employer] (8am - 5pm, M-F)    Patient Active Problem List   Diagnosis     Mural thrombus of heart     Physical Exam   Temp: 99.1  F (37.3  C) Temp src: Oral BP: 113/67 Pulse: 79   Resp: 18 SpO2: 97 % O2 Device: None (Room air)    Vitals:    02/27/23 0622 02/28/23 0615 03/01/23 0600   Weight: 73.6 kg (162 lb 4.6 oz) 74.9 kg (165 lb 1.6 oz) 75.8 kg (167 lb 1.7 oz)     Vital Signs with Ranges  Temp:  [99.1  F (37.3  C)] 99.1  F (37.3  C)  Pulse:  [72-79] 79  Resp:  [16-18] 18  BP: (105-121)/(61-74) 113/67  SpO2:  [94 %-100 %] 97 %  I/O last 3 completed shifts:  In: 220 [P.O.:220]  Out: -     General: Appears her stated age, well nourished, and in no acute distress.  Eyes: No scleral icterus.  HEENT: Neck supple. No JVD.  Cardiovascular: Regular rate and rhythm, normal S1 and S2. Grade 2/6 diastolic murmur at the upper sternal border. No rub or gallop.  Extremities: Moves all extremities well and symmetrically. There is no LE edema.  Respiratory: Breathing non-labored. Lungs  clear to auscultation with no crackles or wheezes bilaterally.  Skin: No pallor or cyanosis.  Psych: Appropriate affect. Mentation normal.  Neurological: No gross motor neurological focal deficits.    Medications     - MEDICATION INSTRUCTIONS -         acetaminophen  975 mg Oral Q8H     polyethylene glycol  17 g Oral Daily     sodium chloride (PF)  3 mL Intracatheter Q8H     warfarin ANTICOAGULANT  7.5 mg Oral ONCE at 18:00     Warfarin Therapy Reminder  1 each Oral See Admin Instructions     Recent Labs   Lab 03/01/23  0630 02/28/23  0705 02/27/23  0626   INR 3.27* 2.80* 2.24*      This note was completed in part using Dragon voice recognition software. Although reviewed after completion, some word and grammatical errors may occur.

## 2023-03-01 NOTE — PLAN OF CARE
VS:Stable on RA  Assist by: Independent    Cardiac: RRR; CP free overnight, murmur auscultated  Neuro: A&Ox4  CMS: Denies paresthesias  Respi: Lungs clear  : Continent to bathroom  GI: Abdo soft, non-tender; reporting slight tenderness to upper abdominal incision site.     Strip/Tele: V-paced    Pressure areas/Skin: Scar to midline chest; Dressed surgical incision to upper abdominal area    LDA's: PIVC to R ACF; SL      Plans:  >Continue anticoagulation with warfarin with target INR around 2.5.  >CV surgery  -recommend attempting continued therapeutic anticoagulation +/- lytic therapy; at this time, no surgical intervention planned  >HaemOnc consult      Problem: Dysrhythmia  Goal: Normalized Cardiac Rhythm  Outcome: Progressing     Problem: Plan of Care - These are the overarching goals to be used throughout the patient stay.    Goal: Absence of Hospital-Acquired Illness or Injury  Intervention: Identify and Manage Fall Risk  Recent Flowsheet Documentation  Taken 2/28/2023 2040 by Ave Live RN  Safety Promotion/Fall Prevention:    clutter free environment maintained    increase visualization of patient    lighting adjusted    room near nurse's station  Intervention: Prevent Skin Injury  Recent Flowsheet Documentation  Taken 2/28/2023 2040 by Ave Live, RN  Body Position: position changed independently  Goal: Optimal Comfort and Wellbeing  Intervention: Monitor Pain and Promote Comfort  Recent Flowsheet Documentation  Taken 2/28/2023 2040 by Ave Live, RN  Pain Management Interventions: medication (see MAR)

## 2023-03-01 NOTE — PROGRESS NOTES
[facility]    Medicine Progress Note - Hospitalist Service    Date of Admission:  2/23/2023    Assessment & Plan   Silvia Otero is a 27 year old female with history of tricuspid valve replacement and third-degree AV block with pacemaker in [facility] admitted on 2/23/2023 for chest pressure.      Third-degree AV block s/p PPM 2016  *Patient had pacemaker placed with her repeat TV surgery in [place] in 2016  *She presented with symptoms of chest pain, shortness of breath and lightheadedness for the past 1 week.   * In ED noted to be in Aflutter with heart rate in the 170s and no pacer spikes.  * In person interrogation of device showed that device had reached ALLISON since last September.    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 Goal INR between 2.5-3.0  *Echo obtained in the ED revealed possible tricuspid mechanical valve thrombus> follow up HANK demonstrated restricted leaflet movement and increased gradient but not obvious thrombus  * repeat echo 2/27 with therapeutic INR continues to demonstrate increased gradients and severe stenosis, cardiology placed CV surgery consult on 2/28    - CV surgery recommends consideration of lytics and a higher INR goal to to treat potential mechanical valve thrombosis. If unsuccessful, re-consult  - cardiology has consulted hem/onc for further recs  - heparin stopped on 2/28 once INR >2.5  - continue with warfarin with pharmacy to dose INR--3.27 today  - CM assisting with outpatient INR follow up, by their report patient last had follow up 10/2022  - pt educated about  importance of need for INR monitoring and medication compliance  - monitor daily Hb, unable to hold AC     Atrial flutter  - now paced     Iron deficiency anemia  * Hgb 8.9 on admission.  MCV 76  * baseline unclear as patient recently [place] from [place]     - labs revealed iron deficiency anemia on admission and patient endorses hx of heavy periods  - s/p 2 transfusions of IV venofer  - monitor Hb daily     Constipation  - scheduled bowel meds          Diet: Regular Diet Adult    DVT Prophylaxis: Warfarin  Iglesias Catheter: Not present  Lines: None     Cardiac Monitoring: ACTIVE order. Indication: Post- EP procedure (48 hours)  Code Status: Full Code      Clinically Significant Risk Factors                                Disposition Plan     Expected Discharge Date: 03/01/2023      Destination: home  Discharge Comments: ? more eval of tricuspid valve restriction          Maximiliano Díaz MD  Hospitalist Service  [facility]  Securely message with [employer] (more info)  Text page via [employer] Paging/Directory   ______________________________________________________________________    Interval History   She denies complaints of chest pain, shortness of breath. Reviewed with patient hospital course and current plan for hem/onc consultation. Discussed about importance of INR monitoring. She expresses her understanding.     Physical Exam   Vital Signs: Temp: 99.1  F (37.3  C) Temp src: Oral BP: 113/67 Pulse: 79   Resp: 18 SpO2: 97 % O2 Device: None (Room air)    Weight: 167 lbs 1.74 oz    General Appearance: Alert, awake and no apparent distress  Respiratory: CTAB, no wheezing  Cardiovascular: regular rate and rhythm  GI: soft and non-tender  Skin: warm and dry      Medical Decision Making       40 MINUTES SPENT BY ME on the date of service doing chart review, history, exam, documentation & further activities per the note.  MANAGEMENT DISCUSSED with the following over the past 24 hours:  patient and RN   NOTE(S)/MEDICAL RECORDS REVIEWED over the past 24 hours: cardiology, CV surgerynotes  Tests ORDERED & REVIEWED in the past 24 hours:  - CBC  - Coags/INR      Data     I have personally reviewed the following data over the past 24 hrs:    4.2  \   7.3 (L)   / 145 (L)     N/A N/A N/A /  N/A   N/A N/A N/A \       INR:  3.27 (H) PTT:  N/A   D-dimer:  N/A Fibrinogen:  N/A       Imaging results reviewed over the past 24 hrs:   No results found for this or any previous visit (from the past 24 hour(s)).

## 2023-03-01 NOTE — PROVIDER NOTIFICATION
MD Notification    Notified Person: MD    Notified Person Name: Arjun    Notification Date/Time:03/01/23 1:24 PM    Notification Interaction: Vocera text    Purpose of Notification:FYI-Just seen by Dr Lovett with hematology. Her recommendation is for lytic therapy, the protocol is in her note.    Orders Received: Please contact cardiology.      Comments: Spoke to SHILPA Araiza-awaiting orders

## 2023-03-01 NOTE — PROGRESS NOTES
CV Surgery    Discussed with Dr. Ya this am. Recommend consideration of lytics and a higher INR goal to treat potential mechanical valve thrombosis. If unsuccessful please re-consult us for further consideration.     Will sign off for now. Call with concerns/questions.    Aide Nuno PA-C

## 2023-03-01 NOTE — PROGRESS NOTES
Brief cardiology progress note:  Discussed case with Dr. Diego who reviewed case with Dr. Covington. Prior to proceeding with lytic therapy, recommend completing CTa of the heart for further assessment of thrombus on the mechanical tricuspid valve given limited data to support lytic therapy in this setting and given that fluoroscopy did not document any clear thrombus. Orders placed for CT of the heart to be completed tomorrow.    PORSCHE Forrester, CNP   Nurse Practitioner  [facility]  Pager: [phone]  Text Page or securely message via [employer] (8am - 5pm, M-F)

## 2023-03-01 NOTE — CONSULTS
[facility]    Hematology/Oncology Consult Note      Date of Admission:  2/23/2023  Date of Consult:  03/01/23  Reason for Consult:  Possible mechanical valve thrombus, anticoagulation recommendations      ASSESSMENT/PLAN:  Silvia Otero is a 27 year old female with mechanical tricuspid valve with severe stenosis deemed high risk for another replacement    I think the assessment for whether she is high risk or not needs to come from cardiothoracic surgery and it appears that they have deemed her high risk.  I was asked to specific question regarding lytic therapy there is data for lytic therapy in this situation and one can consider it in a  high risk situation. THERE IS LIMITED DATA TO SUPPORT THIS APPROACH.The data that supports it is the PROMETEE trial (small number of patients).  It is unclear to me whether there is a thrombus on the study that has been completed and without being absolutely sure that we are dealing with potentially a thrombus exposing the patient to lytic therapy is a concern (risk of bleeding). Floroscopy did not document a thrombus      If there is a need for lytic therapy , please consult Interventional. The data documented is for dosing:   alteplase 10mg IV bolus with an additional 90mg IV infusion over the course of 20 h. Adjuvant therapeutic anticoagulation with heparin infusion guided by aPTT at 1.5-2.0 times the control value should also be administered please consult IRfor further management regarding lytics.  Her INR is 3.0 (with coumadin) which will need to be reversed.     She needs to be on chronic coumadin (DOACs not recommended)    I talked with the cardiology team and due to the complications associated with this case, they are considering transfering to the [place].    I will sign off. Call if questions thank you for the interesting consult    Total time spent on day of visit, including review of tests, obtaining/reviewing separately obtained  history, ordering medications/tests/procedures, communicating with PCP/consultants, and documenting in electronic medical record: 80 minutes    Tarik Lovett MD   [phone]           HISTORY OF PRESENT ILLNESS: Silvia Otero is a 27 year old female with a history of tricuspid valve tumor status postresection in 1997 at the age of 2 eventually requiring tricuspid valve replacement and redo TVR at age 17 all her prior operations were in [place].  She recently moved [place] the [place] about a [place] ago.  She has been on chronic Coumadin due to her mechanical tricuspid valve.  She was admitted on 2/23 after she presented with chest pain and lightheadedness and was found to have elevated gradients across the valve and a suspicion for mechanical valve dysfunction/thrombosis.  She has had problems with maintaining appropriate INR levels on Coumadin.  Cardiothoracic surgery was consulted and they are recommending a trial of Lasix and a higher INR to treat potential mechanical valve thrombosis and if unsuccessful they can reevaluate for possible redo tricuspid valve replacement as surgery in the setting would be high risk due to her for sternotomy.  Cardiology was consulted and they are deferring to us for recommendations regarding anticoagulation.  Warfarin is currently being continued INR of 3.27 today.      Of note her HANK completed on 2/23/2023    1. Left ventricular systolic function is normal. The visual ejection fraction  is 60-65%.  2. No regional wall motion abnormalities noted.  3. The right atrium is moderately dilated.  4. S/P mechanical TVR; leaflets poorly visualized. No evidence for thrombus  Elevated mean gradient 12 mmHg at HR 63 bpm.  5. No thrombus is detected in the left atrial appendage. There is no color  Doppler evidence of an atrial shunt.  6. A contrast injection (Bubble Study) was performed that was negative for  flow across the interatrial septum.  7. Cannot rule out valve obstruction based on HANK  imaging. Recommend cardiac  CTA and/or fluoroscopy for further evaluation of leaflet motion.  ______________________________________________________________________________      REVIEW OF SYSTEMS:   14 point ROS was reviewed and is negative other than as noted above in HPI.       MEDICATIONS:  Current Facility-Administered Medications   Medication     acetaminophen (TYLENOL) tablet 975 mg     diphenhydrAMINE (BENADRYL) capsule 25 mg    Or     diphenhydrAMINE (BENADRYL) injection 25 mg     lidocaine (LMX4) cream     lidocaine 1 % 0.1-1 mL     melatonin tablet 1 mg     naloxone (NARCAN) injection 0.2 mg    Or     naloxone (NARCAN) injection 0.4 mg    Or     naloxone (NARCAN) injection 0.2 mg    Or     naloxone (NARCAN) injection 0.4 mg     ondansetron (ZOFRAN ODT) ODT tab 4 mg    Or     ondansetron (ZOFRAN) injection 4 mg     oxyCODONE-acetaminophen (PERCOCET) 5-325 MG per tablet 1 tablet     Patient is already receiving anticoagulation with heparin, enoxaparin (LOVENOX), warfarin (COUMADIN)  or other anticoagulant medication     polyethylene glycol (MIRALAX) Packet 17 g     senna-docusate (SENOKOT-S/PERICOLACE) 8.6-50 MG per tablet 1 tablet    Or     senna-docusate (SENOKOT-S/PERICOLACE) 8.6-50 MG per tablet 2 tablet     sodium chloride (PF) 0.9% PF flush 3 mL     sodium chloride (PF) 0.9% PF flush 3 mL     warfarin ANTICOAGULANT (COUMADIN) tablet 7.5 mg     Warfarin Dose Required Daily - Pharmacist Managed         ALLERGIES:  No Known Allergies      PAST MEDICAL HISTORY:  No past medical history on file.      PAST SURGICAL HISTORY:  Past Surgical History:   Procedure Laterality Date     EP PACEMAKER GENERATOR REPLACEMENT- DUAL N/A 2/24/2023    Procedure: Pacemaker Generator Replacement Dual;  Surgeon: Jordon Regan MD;  Location:  [facility]         SOCIAL HISTORY:  Social History     Socioeconomic History     Marital status:      Spouse name: Not on file     Number of children: Not on  "file     Years of education: Not on file     Highest education level: Not on file   Occupational History     Not on file   Tobacco Use     Smoking status: Not on file     Smokeless tobacco: Not on file   Substance and Sexual Activity     Alcohol use: Not on file     Drug use: Not on file     Sexual activity: Not on file   Other Topics Concern     Not on file   Social History Narrative     Not on file     Social Determinants of Health     Financial Resource Strain: Not on file   Food Insecurity: Not on file   Transportation Needs: Not on file   Physical Activity: Not on file   Stress: Not on file   Social Connections: Not on file   Intimate Partner Violence: Not on file   Housing Stability: Not on file         FAMILY HISTORY:  No family history on file.      PHYSICAL EXAM:  Vital signs:  Temp: 99.1  F (37.3  C) Temp src: Oral BP: 113/67 Pulse: 79   Resp: 18 SpO2: 97 % O2 Device: None (Room air) Oxygen Delivery: 1 LPM Height: 175.3 cm (5' 9\") Weight: 75.8 kg (167 lb 1.7 oz)  Estimated body mass index is 24.68 kg/m  as calculated from the following:    Height as of this encounter: 1.753 m (5' 9\").    Weight as of this encounter: 75.8 kg (167 lb 1.7 oz).    ECO  GENERAL/CONSTITUTIONAL: No acute distress.  EYES: Pupils are equal, round, and react to light and accommodation. Extraocular movements intact.  No scleral icterus.  ENT/MOUTH: Neck supple. Oropharynx clear, no mucositis.  LYMPH: No anterior cervical, posterior cervical, supraclavicular, axillary or inguinal adenopathy.   RESPIRATORY: Clear to auscultation bilaterally. No crackles or wheezing.   CARDIOVASCULAR: Sternotomy scar looks good  GASTROINTESTINAL: No hepatosplenomegaly, masses, or tenderness. The patient has normal bowel sounds. No guarding.  No distention.  MUSCULOSKELETAL: Warm and well-perfused, no cyanosis, clubbing, or edema.  NEUROLOGIC: Cranial nerves II-XII are intact. Alert, oriented, answers questions appropriately.  INTEGUMENTARY: No " rashes or jaundice.  GAIT: Steady, does not use assistive device      LABS:  CBC RESULTS:   Recent Labs   Lab Test 03/01/23  0630   WBC 4.2   RBC 3.21*   HGB 7.3*   HCT 24.6*   MCV 77*   MCH 22.7*   MCHC 29.7*   RDW 18.7*   *       Recent Labs   Lab Test 02/26/23  0550 02/25/23  0604    140   POTASSIUM 3.9 3.6   CHLORIDE 104 107   CO2 21* 23   ANIONGAP 11 10   GLC 91 88   BUN 14.5 11.4   CR 0.58 0.69   DENIS 9.0 9.6         PATHOLOGY:  na  IMAGING:    na        Thank you for the opportunity to participate in this patient's care.  Please call with any questions.    Tarik Lovett MD  Hematology/Oncology  [facility]

## 2023-03-02 ENCOUNTER — DOCUMENTATION ONLY (OUTPATIENT)
Dept: ANTICOAGULATION | Facility: CLINIC | Age: 28
End: 2023-03-02

## 2023-03-02 ENCOUNTER — APPOINTMENT (OUTPATIENT)
Dept: CARDIOLOGY | Facility: CLINIC | Age: 28
DRG: 259 | End: 2023-03-02
Attending: NURSE PRACTITIONER
Payer: COMMERCIAL

## 2023-03-02 VITALS
BODY MASS INDEX: 24.51 KG/M2 | WEIGHT: 165.5 LBS | RESPIRATION RATE: 18 BRPM | SYSTOLIC BLOOD PRESSURE: 111 MMHG | TEMPERATURE: 97.9 F | HEIGHT: 69 IN | OXYGEN SATURATION: 99 % | HEART RATE: 72 BPM | DIASTOLIC BLOOD PRESSURE: 69 MMHG

## 2023-03-02 DIAGNOSIS — I48.92 ATRIAL FLUTTER, UNSPECIFIED TYPE (H): ICD-10-CM

## 2023-03-02 DIAGNOSIS — Z95.2 HISTORY OF TRICUSPID VALVE REPLACEMENT WITH MECHANICAL VALVE: ICD-10-CM

## 2023-03-02 DIAGNOSIS — I51.3 MURAL THROMBUS OF HEART: Primary | ICD-10-CM

## 2023-03-02 LAB
ANION GAP SERPL CALCULATED.3IONS-SCNC: 16 MMOL/L (ref 7–15)
BUN SERPL-MCNC: 11.2 MG/DL (ref 6–20)
CALCIUM SERPL-MCNC: 9.9 MG/DL (ref 8.6–10)
CHLORIDE SERPL-SCNC: 100 MMOL/L (ref 98–107)
CREAT SERPL-MCNC: 0.53 MG/DL (ref 0.51–0.95)
CREAT SERPL-MCNC: 0.53 MG/DL (ref 0.51–0.95)
DEPRECATED HCO3 PLAS-SCNC: 20 MMOL/L (ref 22–29)
ERYTHROCYTE [DISTWIDTH] IN BLOOD BY AUTOMATED COUNT: 19.9 % (ref 10–15)
GFR SERPL CREATININE-BSD FRML MDRD: >90 ML/MIN/1.73M2
GFR SERPL CREATININE-BSD FRML MDRD: >90 ML/MIN/1.73M2
GLUCOSE SERPL-MCNC: 98 MG/DL (ref 70–99)
HCT VFR BLD AUTO: 28.8 % (ref 35–47)
HGB BLD-MCNC: 8.1 G/DL (ref 11.7–15.7)
INR PPP: 2.7 (ref 0.85–1.15)
MCH RBC QN AUTO: 22.4 PG (ref 26.5–33)
MCHC RBC AUTO-ENTMCNC: 28.1 G/DL (ref 31.5–36.5)
MCV RBC AUTO: 80 FL (ref 78–100)
PLATELET # BLD AUTO: 163 10E3/UL (ref 150–450)
POTASSIUM SERPL-SCNC: 4.4 MMOL/L (ref 3.4–5.3)
RBC # BLD AUTO: 3.62 10E6/UL (ref 3.8–5.2)
SODIUM SERPL-SCNC: 136 MMOL/L (ref 136–145)
WBC # BLD AUTO: 4.6 10E3/UL (ref 4–11)

## 2023-03-02 PROCEDURE — 85610 PROTHROMBIN TIME: CPT | Performed by: INTERNAL MEDICINE

## 2023-03-02 PROCEDURE — 75572 CT HRT W/3D IMAGE: CPT | Mod: 26 | Performed by: INTERNAL MEDICINE

## 2023-03-02 PROCEDURE — 250N000011 HC RX IP 250 OP 636: Performed by: HOSPITALIST

## 2023-03-02 PROCEDURE — 250N000013 HC RX MED GY IP 250 OP 250 PS 637: Performed by: HOSPITALIST

## 2023-03-02 PROCEDURE — 36415 COLL VENOUS BLD VENIPUNCTURE: CPT | Performed by: NURSE PRACTITIONER

## 2023-03-02 PROCEDURE — 75572 CT HRT W/3D IMAGE: CPT

## 2023-03-02 PROCEDURE — 99239 HOSP IP/OBS DSCHRG MGMT >30: CPT | Performed by: INTERNAL MEDICINE

## 2023-03-02 PROCEDURE — 250N000013 HC RX MED GY IP 250 OP 250 PS 637: Performed by: NURSE PRACTITIONER

## 2023-03-02 PROCEDURE — 82310 ASSAY OF CALCIUM: CPT | Performed by: INTERNAL MEDICINE

## 2023-03-02 PROCEDURE — 99291 CRITICAL CARE FIRST HOUR: CPT | Mod: 24 | Performed by: NURSE PRACTITIONER

## 2023-03-02 PROCEDURE — 36415 COLL VENOUS BLD VENIPUNCTURE: CPT | Performed by: INTERNAL MEDICINE

## 2023-03-02 PROCEDURE — 82565 ASSAY OF CREATININE: CPT | Performed by: NURSE PRACTITIONER

## 2023-03-02 PROCEDURE — 999N000128 HC STATISTIC PERIPHERAL IV START W/O US GUIDANCE

## 2023-03-02 PROCEDURE — 85014 HEMATOCRIT: CPT | Performed by: INTERNAL MEDICINE

## 2023-03-02 RX ORDER — IOPAMIDOL 755 MG/ML
50-150 INJECTION, SOLUTION INTRAVASCULAR ONCE
Status: COMPLETED | OUTPATIENT
Start: 2023-03-02 | End: 2023-03-02

## 2023-03-02 RX ORDER — ASPIRIN 81 MG/1
81 TABLET ORAL DAILY
Status: DISCONTINUED | OUTPATIENT
Start: 2023-03-02 | End: 2023-03-02 | Stop reason: HOSPADM

## 2023-03-02 RX ORDER — WARFARIN SODIUM 7.5 MG/1
15 TABLET ORAL
Status: COMPLETED | OUTPATIENT
Start: 2023-03-02 | End: 2023-03-02

## 2023-03-02 RX ADMIN — ASPIRIN 81 MG: 81 TABLET, COATED ORAL at 14:50

## 2023-03-02 RX ADMIN — ACETAMINOPHEN 975 MG: 325 TABLET, FILM COATED ORAL at 14:50

## 2023-03-02 RX ADMIN — IOPAMIDOL 100 ML: 755 INJECTION, SOLUTION INTRAVENOUS at 11:12

## 2023-03-02 RX ADMIN — ACETAMINOPHEN 975 MG: 325 TABLET, FILM COATED ORAL at 06:46

## 2023-03-02 RX ADMIN — WARFARIN SODIUM 15 MG: 7.5 TABLET ORAL at 17:33

## 2023-03-02 ASSESSMENT — ACTIVITIES OF DAILY LIVING (ADL)
ADLS_ACUITY_SCORE: 22
ADLS_ACUITY_SCORE: 24
ADLS_ACUITY_SCORE: 22
ADLS_ACUITY_SCORE: 24
ADLS_ACUITY_SCORE: 24

## 2023-03-02 NOTE — PROGRESS NOTES
[facility]    Cardiology Progress Note    Date of Admission: 02/23/2023  Service Date: 03/02/2023    Summary:  Ms. 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 She was followed closely until 2001 when her tricuspid valve was replaced. In 2016, a new mechanical tricuspid valve was placed as she outgrew the old one. Single chamber (epicardial lead) St. Aniceto PPM was placed at time ov TVR. All the above was done in [facility]. She was admitted on 2/23/2023 for after she presented with chest pain, found to have a possible intracardiac thrombus around her mechanical valve. She was also seen by EP and underwent generator change for her PPM on 2/24/23.    Interval History   Vital signs stable. No acute events overnight. Denies chest pain, shortness of breath, palpitations, or lower extremity edema. We reviewed the plan of care as outlined below. All questions were answered.    Telemetry: V. Paced    Assessment:  1. Status post abdominal PPM generator replacement (Single chamber [epicardial lead] St. Aniceto PPM)     2. 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 Initial concern for thrombus on mechanical TV, but HANK 2/24/23 shows no evidence for thrombus but elevated mean gradient across the tricuspid valve at 12 mmHg at HR 63 bpm. Valve fluoroscopy on 2/24 showed restricted valve motion.  - Warfarin resumed with goal INR around 2.5, bridged with low intensity heparin protocol until reaching therapeutic INR now at 2.8 so heparin stopped and continuing warfarin alone.  - Repeat limited TTE 2/27 still showing severe TR with mean gradient of 15 mmHg.   - CV surgery consulted and recommend consideration of lytics and a higher INR goal to treat potential mechanical valve  thrombosis.  - No signs/symptoms concerning for acute CHF.     Plan:   1. CTa heart today to further assess for possible mechanical tricuspid valve thrombus.   2. If evidence of thrombus by CT, could consider lytic therapy. However, if no clear thrombus by CT would continue anticoagulation with warfarin with a higher INR goal of 3.5 to 4.0 and repeat echo in 6-8 weeks and if stenosis is still severe at that time than refer back to CV surgery to consider re-do TVR.   3. Cardiology will continue to follow along. Possible discharge home this afternoon pending CT findings and review with Dr. Diego.  4. Orders placed for close follow up with a cardiology IFEANYI in ~1-2 weeks. Will need to establish with INR clinic for close monitoring of INR as well.     Thank you for the opportunity to participate in this pleasant patient's care.     PORSCHE Forrester, CNP   Nurse Practitioner  [facility]  Pager: [phone]  Text Page or securely message via [employer] (8am - 5pm, M-F)    Patient Active Problem List   Diagnosis     Mural thrombus of heart     Physical Exam   Temp: 98.6  F (37  C) Temp src: Oral BP: 111/69 Pulse: 72   Resp: 18 SpO2: 99 % O2 Device: None (Room air)    Vitals:    02/28/23 0615 03/01/23 0600 03/02/23 0603   Weight: 74.9 kg (165 lb 1.6 oz) 75.8 kg (167 lb 1.7 oz) 75.1 kg (165 lb 8 oz)     Vital Signs with Ranges  Temp:  [98.6  F (37  C)-99.4  F (37.4  C)] 98.6  F (37  C)  Pulse:  [72] 72  Resp:  [16-18] 18  BP: (111-121)/(69-71) 111/69  SpO2:  [98 %-99 %] 99 %  I/O last 3 completed shifts:  In: 540 [P.O.:540]  Out: -     General: Appears her stated age, well nourished, and in no acute distress.  Eyes: No scleral icterus.  HEENT: Neck supple. No JVD.  Cardiovascular: Regular rate and rhythm, normal S1 and S2. Grade 2/6 diastolic murmur at the upper sternal border. No rub or gallop.  Extremities: Moves all extremities well and symmetrically. There is no LE edema.  Respiratory: Breathing  non-labored. Lungs clear to auscultation with no crackles or wheezes bilaterally.  Skin: No pallor or cyanosis.  Psych: Appropriate affect. Mentation normal.  Neurological: No gross motor neurological focal deficits.    Medications     - MEDICATION INSTRUCTIONS -         acetaminophen  975 mg Oral Q8H     iopamidol (ISOVUE-370)   mL Intravenous Once     polyethylene glycol  17 g Oral Daily     sodium chloride (PF)  10 mL Intravenous Once     sodium chloride (PF)  100 mL Intravenous Once     sodium chloride (PF)  3 mL Intracatheter Q8H     warfarin ANTICOAGULANT  15 mg Oral ONCE at 18:00     Warfarin Therapy Reminder  1 each Oral See Admin Instructions     Recent Labs   Lab 03/02/23  0635 03/01/23  0630 02/28/23  0705   INR 2.70* 3.27* 2.80*      This note was completed in part using Dragon voice recognition software. Although reviewed after completion, some word and grammatical errors may occur.

## 2023-03-02 NOTE — PROGRESS NOTES
"BRIEF NUTRITION ASSESSMENT    REASON FOR ASSESSMENT:  Silvia Otero is a 27 year old female seen by Registered Dietitian for [facility]    NUTRITION HISTORY:  - Pt is quiet, did not offer much information during visit this afternoon.   - She reported usual intakes at home, typically eats meals BID (no breakfast).   - Weight has been stable, no loss or gain.     CURRENT DIET AND INTAKE:  Diet: Regular diet  Sporadic intakes and orders, pt did note she is getting some food brought in. Appetite comes and goes.               ANTHROPOMETRICS:  Height: 5' 9\"  Weight:  165 lbs 8 oz (75.1 kg)  Body mass index is 24.44 kg/m .   Weight Status: Normal BMI  Weight History:   Wt Readings from Last 10 Encounters:   03/02/23 75.1 kg (165 lb 8 oz)     LABS:  Labs reviewed    MALNUTRITION:  Visual Nutrition Focused Physical Assessment (NFPA) completed  Patient does not meet two of the following criteria necessary for diagnosing malnutrition.     % Weight Loss:  None noted  % Intake:  <75% for > 7 days (moderate malnutrition)  Subcutaneous Fat Loss:  None observed  Muscle Loss:  None observed  Fluid Retention:  None noted    NUTRITION INTERVENTION:  Nutrition Diagnosis:  No nutrition diagnosis at this time.    Implementation:  Nutrition Education:  per Provider order if indicated - encouraged high pro foods at each meal    FOLLOW UP/MONITORING:   Will re-evaluate in 7 - 10 days, or sooner, if re-consulted.    Nell Tavera RD, LD  [facility], Orth[facility], [facility]o Spine  Pager: [phone]  Weekend Pager: [phone]      "

## 2023-03-02 NOTE — PLAN OF CARE
A&OX4, RA, denies pain. T-Max 99.4 up independently. Other VSS. Tele- Aflutter CVR/ V-Paced. Incisional site with steri stripes with dried blood.

## 2023-03-02 NOTE — PLAN OF CARE
A&Ox4. Tele remains 100% Vpaced with underlying aflutter. Denies CP or SOB. Hematology consult complete. Plan for CT heart tomorrow to assess TV. NPO after MN.

## 2023-03-02 NOTE — PROGRESS NOTES
Patient is establishing care with Dr. America Christianson on 3/10 at 10 am.   INR clinic referral pended--    Patient discharging from [facility] today 3/2.  Patient has follow-up INR lab appt. Scheduled tomorrow 3/3 at [facility].      Please review and sign     Thanks! Jennifer Adame RN

## 2023-03-02 NOTE — PROGRESS NOTES
ANTICOAGULATION  MANAGEMENT: Discharge Review    Silvia Otero chart reviewed for anticoagulation continuity of care    Hospital Admission on 2/23 for chest pain, SOB and dizziness.    Discharge disposition: Home    Results:    Recent labs: (last 7 days)     02/24/23  0045 02/24/23  1141 02/25/23  0604 02/25/23  1547 02/25/23  2215 02/26/23  0550 02/27/23  0626 02/28/23  0705 03/01/23  0630 03/02/23  0635   INR  --  2.11* 1.75*  --   --  1.63* 2.24* 2.80* 3.27* 2.70*   AAUFH 0.86 0.53  --  0.26 0.31 0.29 0.33 0.33  --   --      Anticoagulation inpatient management:     See Anticoagulation calendar for inpatient dosing and INR results    Anticoagulation discharge instructions:     Warfarin dosing: Next INR 3/3 at [facility]   Bridging: No   INR goal change: Yes: 3.5-4.0      Medication changes affecting anticoagulation: patient appears to be taking Tylenol PRN-will need to clarify if this is a newer medication for pt    Additional factors affecting anticoagulation: Yes: Anemia hgb 8.9, hematocrit low at 28.8 (improving from previous); mechanical tricuspid valve with possible thrombus; unclear if patient is constipated but does have Miralax prescribed recently on med list--will need to discuss      PLAN     Recommend to check INR on 3/3    Spoke with Kalyani, Care coordinator involved in care at discharge for pt    Anticoagulation Calendar updated    Jennifer Adame RN

## 2023-03-02 NOTE — PROGRESS NOTES
Spoke with Dr. Diego.   Requested that cardiology be involved in discharge follow up and communication with cardiology clinic to ensure accurate information is shared and follow up.     Patient does not know where she is going for her INR or instructions.   Will need to explain in great detail where patient is to go for her blood check and follow up at [facility].   Dr. Elen Humphrey

## 2023-03-02 NOTE — PROGRESS NOTES
Care Management Follow Up    Length of Stay (days): 7    Expected Discharge Date: 03/03/2023     Concerns to be Addressed:       Patient plan of care discussed at interdisciplinary rounds: Yes    Anticipated Discharge Disposition: Home     Anticipated Discharge Services: None  Anticipated Discharge DME:      Patient/family educated on Medicare website which has current facility and service quality ratings:    Education Provided on the Discharge Plan:    Patient/Family in Agreement with the Plan: yes    Referrals Placed by CM/SW:    Private pay costs discussed: Not applicable    Additional Information:  Met with patient to discuss setting up care with INR/anticoagulation clinic at discharge.  Had previously discussed going to her clinic she had been going to in [facility] but recommended to establish care with INR clinic for closer management of her warfarin/INR.  Pt in agreement with establishing care with INR/anticoagulation clinic and primary within [place] to manage.    Spoke with DAXA Rodriguez at INR clinic.  She recommended getting patient an appointment within [facility] to establish care and they will then follow for INR/dosing with that provider.    Appointment made for Friday, March 10th at 9:40am in [place] with Dr America Christianson-appointment date, time and address/phone number on AVS.  Once discharge planned and known when pt needs first INR ,CM to call main RN line and ask for  Jennifer at [phone].  Jennifer will schedule the INR and follow up with [place] INR clinic regarding pt having INR checked before seeing new provider to have warfarin dose managed prior to first appointment.  Pt updated with plan and in agreement.    Addendum:  Cardiology recommending INR lab tomorrow.  Spoke with DAXA Rodriguez with INR clinic and  INR appointment scheduled for Friday March 3rd at noon at the [facility] lab-appointment date, time and address on AVS.      Updated pt again with plan for establishing care with  primary provider at [facility] clinic and that INR clinic will be through [place] also.  Pt voiced understanding.    Addendum:  Spoke with Dr Diego regarding cardiology managing INR/warfarin after discharge, per hospitalist recommendation.  Dr Diego ok with pt establishing care with primary MD and having them manage after discharge through anticoagulation clinic.    Kalyani Galdamez RN, BS  Care Coordinator  [email or web address]

## 2023-03-02 NOTE — PLAN OF CARE
Goal Outcome Evaluation:      Plan of Care Reviewed With: patient, friend    Overall Patient Progress: improvingOverall Patient Progress: improving         VSS, tele V-paced, denies pain at this time.  Follow up appointments in place.  Discharge instructions gone over with patient.  IV and tele removed, belongings sent with patient. Transferred off unit via staff and friend will provide transportation home.

## 2023-03-02 NOTE — PLAN OF CARE
Goal Outcome Evaluation:      Plan of Care Reviewed With: patient      Patient is up in the room independent, flat effect, denies pain, did not wanted miralex. VSS, RA, had angio done.

## 2023-03-03 ENCOUNTER — TELEPHONE (OUTPATIENT)
Dept: INTERNAL MEDICINE | Facility: CLINIC | Age: 28
End: 2023-03-03

## 2023-03-03 ENCOUNTER — LAB (OUTPATIENT)
Dept: LAB | Facility: CLINIC | Age: 28
End: 2023-03-03
Payer: COMMERCIAL

## 2023-03-03 ENCOUNTER — PATIENT OUTREACH (OUTPATIENT)
Dept: ONCOLOGY | Facility: CLINIC | Age: 28
End: 2023-03-03

## 2023-03-03 ENCOUNTER — ANTICOAGULATION THERAPY VISIT (OUTPATIENT)
Dept: ANTICOAGULATION | Facility: CLINIC | Age: 28
End: 2023-03-03

## 2023-03-03 DIAGNOSIS — I07.8 TRICUSPID VALVE MASS: ICD-10-CM

## 2023-03-03 DIAGNOSIS — Z79.01 ANTICOAGULATED: ICD-10-CM

## 2023-03-03 DIAGNOSIS — Z95.2 HISTORY OF TRICUSPID VALVE REPLACEMENT WITH MECHANICAL VALVE: ICD-10-CM

## 2023-03-03 DIAGNOSIS — I51.3 MURAL THROMBUS OF HEART: Primary | ICD-10-CM

## 2023-03-03 DIAGNOSIS — I48.92 ATRIAL FLUTTER, UNSPECIFIED TYPE (H): ICD-10-CM

## 2023-03-03 DIAGNOSIS — I07.8 TRICUSPID VALVE MASS: Primary | ICD-10-CM

## 2023-03-03 LAB
ANION GAP SERPL CALCULATED.3IONS-SCNC: 15 MMOL/L (ref 7–15)
BUN SERPL-MCNC: 17.9 MG/DL (ref 6–20)
CALCIUM SERPL-MCNC: 9.5 MG/DL (ref 8.6–10)
CHLORIDE SERPL-SCNC: 101 MMOL/L (ref 98–107)
CREAT SERPL-MCNC: 0.73 MG/DL (ref 0.51–0.95)
DEPRECATED HCO3 PLAS-SCNC: 23 MMOL/L (ref 22–29)
GFR SERPL CREATININE-BSD FRML MDRD: >90 ML/MIN/1.73M2
GLUCOSE SERPL-MCNC: 102 MG/DL (ref 70–99)
INR PPP: 2.51 (ref 0.85–1.15)
POTASSIUM SERPL-SCNC: 4 MMOL/L (ref 3.4–5.3)
SODIUM SERPL-SCNC: 139 MMOL/L (ref 136–145)

## 2023-03-03 PROCEDURE — 85610 PROTHROMBIN TIME: CPT

## 2023-03-03 PROCEDURE — 36415 COLL VENOUS BLD VENIPUNCTURE: CPT

## 2023-03-03 PROCEDURE — 80048 BASIC METABOLIC PNL TOTAL CA: CPT

## 2023-03-03 NOTE — PROGRESS NOTES
March 3, 2023    Hematology/Oncology  referral reviewed.   Referred By Priority: 1-2 Weeks  03/02/23 1608 Sign Tarik Lovett MD      Referred To   Tarik Lovett MD                Diagnoses: Other iron deficiency anemia   Order: Adult Oncology/Hematology  Referral   My Clinical Question Is:anemia    Hematology       Dr Tarik Lovett's hematology consult inpatient 3/1/23 -- BOOKMARKED    OUTGOING CALL to pt:  Introduced my role as nurse navigator with [facility] Hematology/Oncology dept and that we have recd the referral for dx of iron deficiency anemia from Dr Lovett  Pt states that she was not aware of the referral so I explained what anemia is and she would like a  to call her at a later time as she was arriving to another medical appt when we spoke.  Hematology NPS will call pt  in next 1-2 business days. Pt voiced understanding of above instructions and information and denied further questions    Shiloh Solorzano, RN, BSN, OCN  [facility] Hematology/Oncology Nurse Navigator  [phone]

## 2023-03-03 NOTE — TELEPHONE ENCOUNTER
Dr. Brock called and left DVm asking for a call back ASAP as patient has a lab appointment at 12pm but has other questions that she would like to go over with [facility] RN.     Please call back MD to discuss.     Thank you!    Mary Soliz, RN, BSN, PHN  Anticoagulation Nurse  [phone]

## 2023-03-03 NOTE — PROGRESS NOTES
Addendum: Anticoagulation calendar populated with maintenance dose reported on admission med rec:   10 mg on Mondays thru Thursdays, then 15 mg Fridays thru Sundays. Reported last dose 2/22.    Zelda Aguilera RPH

## 2023-03-03 NOTE — PROGRESS NOTES
ANTICOAGULATION MANAGEMENT     Silvia KITCHEN Branden 27 year old female is on warfarin with subtherapeutic INR result. (Goal INR 3.5-4.0)    Recent labs: (last 7 days)     03/03/23  1432   INR 2.51*       ASSESSMENT       Source(s): Chart Review and Patient/Caregiver Call       Warfarin doses taken: took 15mg last night, all other dosing was while inpatient    Diet: No new diet changes identified    New illness, injury, or hospitalization: Yes: just discharged from the hospital last night, IP x7 days for mural thrombus    Medication/supplement changes: n/a    Signs or symptoms of bleeding or clotting: No    Previous INR: Subtherapeutic    Additional findings: None     PLAN     Recommended plan for no diet, medication or health factor changes affecting INR     Dosing Instructions: booster dose then Increase your warfarin dose (10% change) with next INR in 1 week       Summary  As of 3/3/2023    Full warfarin instructions:  3/3: 20 mg; Otherwise 10 mg every Mon, Thu; 15 mg all other days   Next INR check:  3/10/2023             Telephone call with Silvia who agrees to plan and repeated back plan correctly    Check at provider office visit    Education provided:     Taking warfarin: take warfarin at same time each day; preferably in the evening, prescribed tablet strength and color and importance of following ACC instructions vs instructions on the prescription bottle    Goal range and lab monitoring: goal range and significance of current result, Importance of therapeutic range and frequency of lab work when starting warfarin and importance of following up when instructed (extends after stability established)    Dietary considerations: impact of vitamin K foods on INR and Impact of protein intake on INR     Healthy lifestyle considerations: potential interaction between warfarin and alcohol, avoid activities that have a high risk for falling or injury such as contact sports and warfarin is not recommended during pregnancy;  please speak with your provider if you plan to become pregnant and notify them immediately if you become pregnant.    Symptom monitoring: monitoring for bleeding signs and symptoms, monitoring for clotting signs and symptoms, monitoring for stroke signs and symptoms, when to seek medical attention/emergency care and if you hit your head or have a bad fall seek emergency care    Importance of notifying anticoagulation clinic for: changes in medications; a sooner lab recheck maybe needed, diarrhea, nausea/vomiting, reduced intake, cold/flu, and/or infections; a sooner lab recheck maybe needed, upcoming surgeries and procedures 2 weeks in advance, if you did not receive dosing instructions on the same day as your labs were checked and immediately if pregnancy is suspected    Contact [phone] with any changes, questions or concerns.     Plan made with [facility] Pharmacist Zelda Navarro, RN  Anticoagulation Clinic  3/3/2023    _______________________________________________________________________     Anticoagulation Episode Summary     Current INR goal:  Other - see comment   TTR:  --   Target end date:  Indefinite   Send INR reminders to:  [facility] HEART INR NURSE    Indications    Mural thrombus of heart [I51.3]  History of tricuspid valve replacement with mechanical valve [Z95.2]  Atrial flutter  unspecified type (H) [I48.92]           Comments:  INR goal 3.5-4         Anticoagulation Care Providers     Provider Role Specialty Phone number    Eduardo Araiza NP Referring Cardiovascular Disease [phone]    Renee Huynh NP Referring  [phone]

## 2023-03-03 NOTE — TELEPHONE ENCOUNTER
Called and spoke with Dr. Humphrey.  She needs to have patient check BMP today-- we will combine the appts and have an INR drawn at the same time she does the BMP at [facility].     Will monitor for result.      Jade Navarro RN  [facility] Anticoagulation  [phone]

## 2023-03-03 NOTE — TELEPHONE ENCOUNTER
Was trying to get INR and BMP and patient is completing requested labs at DR. Stewart office. No additional assistance needed.

## 2023-03-03 NOTE — DISCHARGE SUMMARY
[facility]  Hospitalist Discharge Summary      Date of Admission:  2/23/2023  Date of Discharge:  3/3/2023  Discharging Provider: JOSHUA STANLEY MD  Discharge Service: Hospitalist Service    Discharge Diagnoses   1. 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 Tricuspid valve replaced 2016 with mechanical tricuspid valve.  3. Previous single-chamber epicardial lead [place] PPM  4. 2/24/2023 generator change for PPM  5. Cardiac evaluation with possible intracardiac thrombus around her mechanical valve  6. 3/2/2023 CT angiogram showing mechanical bileaflet tricuspid valve appears well-seated.  Both leaflets of the mechanical tricuspid valve have limited excursion.  No thrombus.   7. 3/2/2023 CT chest with CT angiogram no significant noncardiac nonvalvular findings.  8. INR goal 3.5-4  9. Recommended addition of baby ASA 81 mg po daily   10. Repeat echo 6 to 8 weeks  11. Anemia with iron deficiency     Follow-ups Needed After Discharge   Follow-up Appointments     Follow-up and recommended labs and tests       Follow up with primary care provider, America Christianson, within 7   days to evaluate medication change.  The following labs/tests are   recommended: basic metabolic profile and cbc/platlet .           Discharge Disposition   Discharged to home  Condition at discharge: Stable    Hospital Course   Silvia Otero is a 27 year old female with history of tricuspid valve replacement and third-degree AV block with pacemaker in [facility] admitted on 2/23/2023 for chest pressure.      Third-degree AV block s/p PPM 2016  Patient had pacemaker placed with her repeat TV surgery in [place] in 2016. She presented with symptoms of chest pain, shortness of breath and lightheadedness for the past 1 week prior to admission. In ED noted to be in Aflutter with heart rate in the 170s  and no pacer spikes.  In person interrogation of device showed that device had reached ALLISON since last September. Underwent generator replacement with EP 02/24, remains 100% paced. 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 Goal INR between 2.5-3.0  Echo obtained in the ED revealed possible tricuspid mechanical valve thrombus> follow up HANK demonstrated restricted leaflet movement and increased gradient but not obvious thrombus. Repeat echo 2/27 with therapeutic INR continues to demonstrate increased gradients and severe stenosis, cardiology placed CV surgery consult on 2/28  CV surgery recommended consideration of lytics and a higher INR goal to to treat potential mechanical valve thrombosis. If unsuccessful, re-consult. Cardiology has consulted hem/onc for further recs as well.  Patient seen by heme-onc.  Discussion regarding uncertainty of whether a thrombus exists.  Heme-onc noted if the need for lytic therapy then please consult interventional. (Further details in consult note ) patient initially on heparin until INR greater than 2.5 and then stopped heparin.  Continued with pharmacy dosing INR.  Goal then increase to 3.5-4.  In addition patient had baby aspirin added. CM assisting with outpatient INR follow up, by their report patient last had follow up 10/2022  pt educated about importance of need for INR monitoring and medication compliance  Prior to discharge she was established with a clinic for her Coumadin and INR monitoring.  Goal again 3.5-4 per cardiology.  Baby aspirin added per cardiology.  Close follow-up scheduled after discharge.   Patient to have close follow-up with  cardiology in 1 to 2 weeks postdischarge.    Device check March 6 at the heart center    New primary care follow-up with Dr. America Christianson on March 10 Friday at 9:40 AM.       Atrial flutter  now paced     Iron deficiency anemia  Hgb 8.9 on admission.  MCV 76. Baseline unclear as [place]. labs revealed iron deficiency anemia on admission and patient endorses hx of heavy periods.  Patient was given 2 doses of IV Venofer 300 mg continue to monitor hemoglobin outpatient  Patient also scheduled to follow-up hematology oncology for anemia after discharge.     Constipation  scheduled bowel meds     Consultations This Hospital Stay   PHARMACY IP CONSULT  PHARMACY IP CONSULT  PHARMACY IP CONSULT  CARDIOLOGY IP CONSULT  ELECTROPHYSIOLOGY IP CONSULT  PHARMACY TO DOSE WARFARIN  PHARMACY IP CONSULT  PHARMACY IP CONSULT  VASCULAR ACCESS ADULT IP CONSULT  CARE MANAGEMENT / SOCIAL WORK IP CONSULT  CARDIOVASCULAR SURGERY IP CONSULT  HEMATOLOGY & ONCOLOGY IP CONSULT  VASCULAR ACCESS ADULT IP CONSULT    Code Status   Prior    Time Spent on this Encounter   I, JOSHUA STANLEY MD, personally saw the patient today and spent greater than 30 minutes discharging this patient.       JOSHUA STANLEY MD  [facility] HEART [facility]  [address] [place] [address] AVE, [address] [address]  Phone: [phone]  ______________________________________________________________________    Physical Exam   Temp: 97.9  F (36.6  C) Temp src: Oral                Vitals:    02/28/23 0615 03/01/23 0600 03/02/23 0603   Weight: 74.9 kg (165 lb 1.6 oz) 75.8 kg (167 lb 1.7 oz) 75.1 kg (165 lb 8 oz)     Constitutional: Appears stated age, no acute distress.  Respiratory: Breath sounds CTA. No increased work of breathing.  Cardiovascular: RRR, no rub  3/6 murmur.   GI: Soft, non-tender, non-distended.  Skin: Warm, dry, no rashes or lesions.  Other: No edema          Primary Care Physician   America Dayana  Sammy    Discharge Orders      Follow-Up with Cardiology IFEANYI      Follow-Up with Cardiology      Anticoagulation Clinic Referral      Adult Oncology/Hematology  Referral      Reason for your hospital stay    Shortness of breath     Follow-up and recommended labs and tests     Follow up with primary care provider, America Christianson, within 7 days to evaluate medication change.  The following labs/tests are recommended: basic metabolic profile and cbc/platlet .     Activity    Your activity upon discharge: activity as tolerated     Brief Discharge Instructions    continue anticoagulation with warfarin with a higher INR goal of 3.5 to 4.0 and repeat echo in 6-8 weeks and if stenosis is still severe at that time than refer back to CV surgery to consider re-do TVR.     Brief Discharge Instructions    Recommend a cbc with platelet early next week on 3/6 or 3/7 for cbc/platelet and weekly with your INR check. Sooner if your provider feels necessary     Brief Discharge Instructions    You have an INR check for your coumadin tomorrow on 3/3/2023 in your new clinic. Cardiology requested goal of 3.5-4. Take baby aspirin 81 mg with your coumadin.   Further coumadin (warfarin) orders from your new clinic on dosing of coumadin tomorrow 3/3 after your blood check. Do not take your coumadin 3/3 until your blood is checked and you are told a dose for tomorrow     Echocardiogram Complete    Administration of IV contrast will be tailored to this examination per the appropriate written protocol listed in the Echocardiography department Protocol Book, or by the supervising Cardiologist. This may result in an order change.    Use of contrast is at the discretion of the supervising Cardiologist.     Diet    Follow this diet upon discharge: Orders Placed This Encounter      Regular Diet Adult       Significant Results and Procedures   Most Recent 3 CBC's:Recent Labs   Lab Test 03/02/23  0635 03/01/23  0630 02/28/23  0705    WBC 4.6 4.2 3.6*   HGB 8.1* 7.3* 7.6*   MCV 80 77* 77*    145* 138*     Most Recent 3 BMP's:Recent Labs   Lab Test 03/02/23  0901 02/26/23  0550 02/25/23  0604    136 140   POTASSIUM 4.4 3.9 3.6   CHLORIDE 100 104 107   CO2 20* 21* 23   BUN 11.2 14.5 11.4   CR 0.53  0.53 0.58 0.69   ANIONGAP 16* 11 10   DENIS 9.9 9.0 9.6   GLC 98 91 88     Most Recent 2 LFT's:Recent Labs   Lab Test 02/24/23  0045   AST 27   ALT 14   ALKPHOS 72   BILITOTAL 0.5     Most Recent 3 INR's:Recent Labs   Lab Test 03/02/23  0635 03/01/23  0630 02/28/23  0705   INR 2.70* 3.27* 2.80*     Most Recent TSH and T4:Recent Labs   Lab Test 02/24/23  0045   TSH 2.39   ,   Results for orders placed or performed during the hospital encounter of 02/23/23   XR Chest 2 Views    Narrative    CHEST TWO VIEWS  2/23/2023 3:17 PM     HISTORY: 27-year-old woman with shortness of breath and complete heart  block history, evaluate for CHF.    COMPARISON: None.       Impression    IMPRESSION: Heart size is normal. Mechanical heart valve, median  sternotomy wires, and implantable cardiac device in the anterior soft  tissues of the upper abdomen are noted. No pleural effusion,  pneumothorax, or abnormal area of consolidation. Pulmonary vascularity  is normal in size.    RADHA MONTGOMERY MD         SYSTEM ID:  [id number]   XR Chest/Heart Fluoro    Narrative    CHEST/HEART FLUORO  2/28/2023 9:18 AM       INDICATION: Chest pressure.    COMPARISON: Chest x-ray 2/23/2023       Impression    IMPRESSION: 0.2 minutes fluoroscopy utilized. No spot images.    Mild elevation of the right hemidiaphragm. There is normal  diaphragmatic excursion bilaterally. No evidence of diaphragmatic  paralysis.    DONOVAN ROBERTS MD         SYSTEM ID:  [id number]   CTA  Angiogram Heart    Narrative    Procedure: CTA ANGIOGRAM HEART     Examination Date: 3/2/2023 11:27 AM     INDICATION: Evaluate mechanical tricuspid valve.    Ordering Provider: Dr. Diego    Overall quality of the  study: Adequate.     PROCEDURE: After obtaining informed consent,  ECG gated multi-slice  computed tomography of the heart  with intravenous contrast  (Isovue  370, 100 cc, wasted 0 cc)  was  performed on a Siemens Dual Source  Flash scanner without incident. CTA was performed in the Flash mode at  a heart rate of 70 bpm with 120 kVp. Images were reconstructed and  analyzed on a [employer] workstation. Scan protocol was optimized to  minimize radiation exposure. The total radiation exposure was  calculated to be 579 DLP, and 8.1 mSv.      Impression    IMPRESSION:  1. Mechanical bileaflet tricuspid valve noted . It appears well  seated.  2. Both the leaflets of mechanical tricuspid valve have very limited  excursion during cardiac cycle. No thrombus noted.    Discussed with Dr. Diego.    FINDINGS:      1. Mechanical bileaflet tricuspid valve noted (The valve type  unknown.). It appears well seated.   2. No valve thromboses noted. Both the leaflets of mechanical  tricuspid valve appear to have very limited excursion during cardiac  cycle. Approximate closing angle 37 degree, approximately opening  angle 41 degrees.  3. Severe right atrial enlargement. Dilated IVC.  4. Epicardial pacemaker leads noted.    BITA LI MD         SYSTEM ID:  [id number]   Radiologist Consult For Cardiology    Narrative    RADIOLOGIST CONSULT FOR CARDIOLOGY March 2, 2023 11:27 AM     HISTORY: Chest pain.    COMPARISON: None.    TECHNIQUE: Volumetric helical acquisition of CT images of the chest  from the clavicles to the kidneys were acquired without IV contrast.  Radiation dose for this scan was reduced using automated exposure  control, adjustment of the mA and/or kV according to patient size, or  iterative reconstruction technique.      Impression    IMPRESSION: No significant noncardiac nonvascular findings. Please see  cardiology report for cardiac and vascular findings.    ALEC ROMAN MD         SYSTEM ID:  [id number]   EP Device     Narrative    PROCEDURES PERFORMED:  1. Conscious sedation.  2. Pacemaker generator change.  3. Cardiac fluoroscopy.    INDICATION: Generator battery at ALLISON.    HPI: 27-year-old lady with a history of atrial myxoma involving the   tricuspid valve, who required surgical resection in 1997 and tricuspid   valve replacement.  The procedure was complicated by complete heart block,   and she underwent pacemaker implantation.  She had several subsequent   tricuspid valve replacements (2001 and 2016; most recent one with a   mechanical tricuspid valve). She was found to have generator battery ALLISON,   and was referred for pacemaker generator change.    Of note, patient had a epicardial LV lead, which connects to a device   previously placed intramuscularly in the upper portion of the abdomen.    Risks and benefits of the procedure were reviewed including but not   limited to arrhythmia, pain, infection, bleeding, skin damage from ionized   radiation, kidney damage or allergic reactions to conscious dye, injury to   the neighboring vascular structures, need for emergent cardiopulmonary   resuscitation, heart attack, stroke or death. Alternatives were discussed   including doing nothing. All questions were answered to the patient's   satisfaction. Informed consent was obtained and patient wished to proceed.    DESCRIPTION OF PROCEDURE: After written, informed consent was obtained;   the patient was brought to the Electrophysiology Laboratory. An   intravenous infusion of prophylactic antibiotic was begun. The patient was   sterilely prepped from the level of the iliac crest to the level of the   chin with an antibiotic soap and disinfectant, and draped appropriately.     Following infiltration with 1% lidocaine, an incision was made immediately   superior to the upper margin of the old generator, taking care to avoid   the indwelling lead. Using blunt and sharp dissection, the incision was   extended down to the generator and  the generator and lead were dissected   free of adhesions. Lead stimulation and sensing thresholds were found to   be satisfactory. The pocket was slightly expanded with blunt dissection. A   new generator was connected to the lead and found to perform   appropriately.     After irrigation with saline solution, the pocket was inspected and no   bleeding was seen.  The generator was inserted into the pocket. The wound   was then closed with subcutaneous sutures.    Of note, fluoroscopic evaluation of mechanical prosthetic tricuspid valve   was performed due to the history of high gradient across the tricuspid   valve.  Minimal movement of the prostatic tricuspid valve opening was   noticed, suggesting underlying prostatic tricuspid valve stenosis.    PACEMAKER GENERATOR: [place], model Assurity-MRI 1272, serial #[id number].    LEAD INFORMATION:  Right ventricular  lead: [place], model 1084T/35, serial #[id number] (implante   date: 02/11/2016).    MEASURED DATA:  Right ventricular lead: Sensing- pacing dependent, threshold 2 volt at 1.0   msec, impedance 230 ohms.    IMPRESSION:  1. Successful pacemaker generator battery exchange.  2. Bradycardia pacing set to VVIR .  3. Fluoroscopy evaluation of mechanical prosthetic tricuspid valve was   suggestive of underlying prosthetic valve stenosis.    RECOMMENDATIONS:  1. Avoid vascular access to the left jugular and subclavian veins.  2. Keep wound clean, dry and covered for seven days.  3. May start oral medications. Avoid IV or subcutaneous heparin for at   least two weeks. If heparin must be used, please contract the procedural   team to discuss.  4. Wound care as follows:  a) Do not get incision wet for three days.  b) Leave the Steri-Strips in place, allow to come off naturally. If they   do not come off in two weeks, you may remove them.  c) Check incision daily and call if they notice one of the following:   redness, drainage (pus or blood), swelling, warmth or if you  develop   fever.    If you have questions regarding wound care, please contact our office.    Jordon Regan MD           Echocardiogram Complete     Value    LVEF  60-65%    Narrative    [id number]  [id number]  [id number]^PATT^ANTHONY^SYED                                                                       Version 2     [facility]  Echocardiography Laboratory  [address] [place] [address]     Name: EDDY AKBAR  MRN: [id number]  : 1995  Study Date: 2023 04:10 PM  Age: 27 yrs  Gender: Female  Patient Location: [facility]  Reason For Study: Abn EKG  Ordering Physician: ANTHONY TANNER  Performed By: Shawna Pang     BSA: 1.9 m2  Height: 69 in  Weight: 164 lb  HR: 63  BP: 116/62 mmHg  ______________________________________________________________________________  Procedure  Complete Portable Echo Adult. Optison (NDC #7587-2867) given intravenously.  ______________________________________________________________________________  Interpretation Summary     The left ventricle is normal in size.  The visual ejection fraction is 60-65%.  Diastolic Doppler findings (E/E' ratio and/or other parameters) suggest left  ventricular filling pressures are increased.  No regional wall motion abnormalities noted.  The right ventricle is moderately dilated.  Moderately decreased right ventricular systolic function  There is a mechanical Tricuspid valve with elevated mean gradient 15mmHg and  possible thrombus but cannot adequately assess with this modality due to  shadowing from the mechanical valve.     Patient was also in rapid atrial flutter initially.     Recommend HANK to better assess tricuspid valve and supplement IV heparin for  subtherapeutic INR. Discussed with Dr. Tanner at 5:15pm.  ______________________________________________________________________________  Left Ventricle  The left ventricle is normal in size. There is normal left ventricular wall  thickness. The  visual ejection fraction is 60-65%. Diastolic Doppler findings  (E/E' ratio and/or other parameters) suggest left ventricular filling  pressures are increased. No regional wall motion abnormalities noted.     Right Ventricle  The right ventricle is moderately dilated. Moderately decreased right  ventricular systolic function.     Atria  Normal left atrial size. The right atrium is moderately dilated. There is no  color Doppler evidence of an atrial shunt.     Mitral Valve  The mitral valve is normal in structure and function. There is trace mitral  regurgitation.     Tricuspid Valve  There is trace tricuspid regurgitation. Right ventricular systolic pressure  could not be approximated due to inadequate tricuspid regurgitation. IVC  diameter >2.1 cm collapsing <50% with sniff suggests a high RA pressure  estimated at 15 mmHg or greater. The Tricuspid valve mean diastolic gradient  is 15.4 mmHg mmHg. There is a mechanical Tricuspid valve.     Aortic Valve  Normal tricuspid aortic valve. No aortic regurgitation is present.     Pulmonic Valve  There is no pulmonic valvular regurgitation.     Vessels  Normal size aorta. The aortic root is normal size.     Pericardium  There is no pericardial effusion.     Rhythm  The rhythm was atrial flutter.  ______________________________________________________________________________  MMode/2D Measurements & Calculations     IVSd: 0.77 cm  LVIDd: 5.4 cm  LVIDs: 3.1 cm  LVPWd: 0.90 cm  FS: 41.5 %  LV mass(C)d: 161.8 grams  LV mass(C)dI: 85.2 grams/m2  Ao root diam: 2.7 cm  LA dimension: 3.8 cm  asc Aorta Diam: 2.7 cm  LA/Ao: 1.4  RWT: 0.34     Doppler Measurements & Calculations  MV E max kal: 89.2 cm/sec  MV A max kal: 52.9 cm/sec  MV E/A: 1.7  MV dec slope: 460.0 cm/sec2  MV dec time: 0.20 sec  TV V2 max: 248.4 cm/sec  TV max P.7 mmHg  TV mean PG: 15.4 mmHg  TV V2 VTI: 136.5 cm  PA acc time: 0.11 sec  E/E' av.5  Lateral E/e': 5.4     Medial E/e': 19.6      ______________________________________________________________________________  Report approved by: Brandon Dubon 2023 05:26 PM         Transesophageal Echocardiogram     Value    LVEF  60-65%    [facility]    [id number]  [facility]ly [facility]  [id number]  [id number]^ISAIAH^SHAWN^SHEN     [facility]  Echocardiography Laboratory  [address] [facility] [place] [address]     Name: EDDY AKBAR  MRN: [id number]  : 1995  Study Date: 2023 08:25 AM  Age: 27 yrs  Gender: Female  Patient Location: [facility]  Reason For Study: Tricuspid Valve Replacement  Ordering Physician: SHAWN COLON  Performed By: Basil Daley     BSA: 1.9 m2  Height: 69 in  Weight: 164 lb  HR: 63  BP: 126/72 mmHg  ______________________________________________________________________________  Procedure  Complete HANK Adult. 3D image acquisition, reconstruction, and real-time  interpretation was performed. HANK Probe #[id number] was also used during the  procedure.  ______________________________________________________________________________  Interpretation Summary     1. Left ventricular systolic function is normal. The visual ejection fraction  is 60-65%.  2. No regional wall motion abnormalities noted.  3. The right atrium is moderately dilated.  4. S/P mechanical TVR; leaflets poorly visualized. No evidence for thrombus  Elevated mean gradient 12 mmHg at HR 63 bpm.  5. No thrombus is detected in the left atrial appendage. There is no color  Doppler evidence of an atrial shunt.  6. A contrast injection (Bubble Study) was performed that was negative for  flow across the interatrial septum.  7. Cannot rule out valve obstruction based on HANK imaging. Recommend cardiac  CTA and/or fluoroscopy for further evaluation of leaflet motion.  ______________________________________________________________________________  HANK  I determined this patient to be an appropriate candidate for the planned  sedation and procedure and have  reassessed the patient immediately prior to  sedation and procedure. Total sedation time: 46 minutes of continuous bedside  1:1 monitoring. Versed (6mg) was given intravenously. Fentanyl (100mcg) was  given intravenously.     Left Ventricle  The left ventricle is normal in size. Left ventricular systolic function is  normal. The visual ejection fraction is 60-65%. No regional wall motion  abnormalities noted.     Right Ventricle  The right ventricle is normal in structure, function and size.     Atria  Normal left atrial size. The right atrium is moderately dilated. There is no  color Doppler evidence of an atrial shunt. A contrast injection (Bubble Study)  was performed that was negative for flow across the interatrial septum. No  thrombus is detected in the left atrial appendage.     Mitral Valve  The mitral valve is normal in structure and function. There is trace mitral  regurgitation.     Tricuspid Valve  S/P mechanical TVR; leaflets poorly visualized. No evidence for thrombus  Elevated mean gradient 12 mmHg at HR 63 bpm.     Aortic Valve  The aortic valve is normal in structure and function.     Pulmonic Valve  The pulmonic valve is not well seen, but is grossly normal.     Vessels  Normal size aorta.     Pericardial/Pleural  There is no pericardial effusion.     Rhythm  The rhythm was atrial flutter.  ______________________________________________________________________________  Report approved by: Nano Quintero 2023 12:49 PM     ______________________________________________________________________________      Echocardiogram Limited    Narrative    [id number]  [id number]  [id number]  [id number]^TAMIKO^RENÉE     [facility]  Echocardiography Laboratory  [address] [place] [address]     Name: EDDY TORRES FIDELINA  MRN: [id number]  : 1995  Study Date: 2023 04:26 PM  Age: 27 yrs  Gender: Female  Patient Location: [facility]  Reason For Study: Prosthetic Valve  Dysfunction  Ordering Physician: RENÉE MORRISON  Referring Physician: Kathy Ref-Primary, Physician  Performed By: Kimber Coates     BSA: 1.9 m2  Height: 69 in  Weight: 162 lb  HR: 72  BP: 106/58 mmHg  ______________________________________________________________________________  Procedure  Limited Portable Echo Adult.  ______________________________________________________________________________  Interpretation Summary     Limited echo, only evaluate gradient across TV - Per Dr Morrison     There is severe tricuspid stenosis. MG is 15 mm Hg with elevated PHT  consistent with severe mechanical prosthetic valve dysfunction as previously  noted.  ______________________________________________________________________________  Left Ventricle  Left ventricular systolic function is normal.     Right Ventricle  The right ventricle is not well visualized. Mildly decreased right ventricular  systolic function.     Mitral Valve  The mitral valve is normal in structure and function.     Tricuspid Valve  There is severe tricuspid stenosis.     Aortic Valve  The aortic valve is normal in structure and function.  ______________________________________________________________________________  Report approved by: Brandon Grossman 02/27/2023 04:50 PM     ______________________________________________________________________________            Discharge Medications   Discharge Medication List as of 3/2/2023  5:41 PM      START taking these medications    Details   aspirin (ASA) 81 MG EC tablet Take 1 tablet (81 mg) by mouth daily, Disp-100 tablet, R-0, E-Prescribe         CONTINUE these medications which have NOT CHANGED    Details   warfarin ANTICOAGULANT (COUMADIN) 10 MG tablet Take by mouth See Admin Instructions 10 mg on Mondays thru Thursdays, then 15 mg Fridays thru Sundays., Historical         STOP taking these medications       acetaminophen (TYLENOL) 325 MG tablet Comments:   Reason for Stopping:             Allergies   No  Known Allergies

## 2023-03-03 NOTE — TELEPHONE ENCOUNTER
Dr. King calling for provider to talk to.    Writer relayed the provider is out today    Dr. King asked for the covering provider to give her a call about this patient who has an appt upcoming with provider.    Dr. King didn't say why she wanted the call back and only gave writer last name to find in which writer had to go look through the schedule to find     Dr. king didn't emphasize the importance    Dr. King looking for a call back from covering provider (Renee Huynh)     Call Back   [phone]

## 2023-03-06 ENCOUNTER — TELEPHONE (OUTPATIENT)
Dept: CARDIOLOGY | Facility: CLINIC | Age: 28
End: 2023-03-06

## 2023-03-06 ENCOUNTER — ANCILLARY PROCEDURE (OUTPATIENT)
Dept: CARDIOLOGY | Facility: CLINIC | Age: 28
End: 2023-03-06
Attending: INTERNAL MEDICINE
Payer: COMMERCIAL

## 2023-03-06 DIAGNOSIS — Z95.0 CARDIAC PACEMAKER IN SITU: ICD-10-CM

## 2023-03-06 DIAGNOSIS — I44.2 COMPLETE HEART BLOCK (H): ICD-10-CM

## 2023-03-06 PROCEDURE — 93279 PRGRMG DEV EVAL PM/LDLS PM: CPT | Performed by: INTERNAL MEDICINE

## 2023-03-06 NOTE — TELEPHONE ENCOUNTER
"Patient was admitted to [facility] on 2/23/23 after she presented with chest pain, found to have a possible intracardiac thrombus around her mechanical valve. Subtherapeutic INR on admission at 2.1.    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 She was followed closely until 2001 when her tricuspid valve was replaced. In 2016, a new mechanical tricuspid valve was placed as she outgrew the old one. Single chamber (epicardial lead) [facility] PPM was placed at time ov TVR. All the above was done in [place].    2/24/23: Echo showed EF of 60% and no evidence for thrombus but elevated mean gradient across the tricuspid valve at 12 mmHg at HR 63 bpm. Valve fluoroscopy on 2/24 showed restricted valve motion.    2/24/23: PPM gen change.    2/27/23: Repeat limited TTE still showing severe TR with mean gradient of 15 mmHg.     3/2/23: CTa showed mechanical bileaflet tricuspid valve noted . It appears well seated. Both the leaflets of mechanical tricuspid valve have very limited excursion during cardiac cycle. No thrombus noted.    Cardiology plan: \"If no clear thrombus by CT would continue anticoagulation with Warfarin with a higher INR goal of 3.5 to 4.0 and repeat echo in 6-8 weeks and if stenosis is still severe at that time than refer back to CV surgery to consider re-do TVR.\"    Pt was started on ASA and PTA Warfarin continued with therapeutic INR range as above at time of discharge.     Called patient to discuss any post hospital d/c questions she may have, review medication changes, and confirm f/u appts. Patient denied any questions regarding new medications or changes with their PTA medications.    Patient denied any SOB, chest pain, fever or light headedness.     Left anterior chest PPM site is without bleeding, swelling, redness or signs of infection.     RN confirmed with patient that she needs to be " scheduled for a follow up echo and cardiology MARY OV and cardiologist OV as ordered. Scheduling, Device RN and Dr. Blair Team RN phone numbers all provided. Phone call transferred to scheduling.    Patient advised to call clinic with any cardiac related questions or concerns prior to this mary't. Patient verbalized understanding and agreed with plan. MALCOLM Easton RN.

## 2023-03-07 NOTE — TELEPHONE ENCOUNTER
RECORDS STATUS - ALL OTHER DIAGNOSIS      RECORDS RECEIVED FROM: Epic   DATE RECEIVED:    NOTES STATUS DETAILS   OFFICE NOTE from referring provider Epic 03/01/23: Dr. Tarik Lovett   DISCHARGE SUMMARY from hospital [facility] 02/23/23: [facility]   MEDICATION LIST [facility]    LABS     PATHOLOGY REPORTS     ANYTHING RELATED TO DIAGNOSIS Epic Most recent 03/03/23

## 2023-03-09 ENCOUNTER — NURSE TRIAGE (OUTPATIENT)
Dept: CARDIOLOGY | Facility: CLINIC | Age: 28
End: 2023-03-09
Payer: COMMERCIAL

## 2023-03-09 NOTE — TELEPHONE ENCOUNTER
"Patient called with C/O left-sided chest discomfort starting yesterday on and off. She usually feels it when moving around and is current. Rates a 6. Does not radiate. She has some dizziness. She is unsure if muscular. Patient was hospitalized 2/23/23-3/3/23 for chest pain and had PPM implanted 2/24/23. She saw Hany SHAW while in hospital. She is currently taking ASA 81mg and warfarin 10mg. Patient has an appointment for follow-up Echo on 3/31/23 with Graciela Carrasco and with Evie Banerjee on 5/24/23.     1. LOCATION: \"Where does it hurt?\" Left side.  2. RADIATION: \"Does the pain go anywhere else?\" (e.g., into neck, jaw, arms, back) No.  3. ONSET: \"When did the chest pain begin?\" (Minutes, hours or days) Yesterday.  4. PATTERN \"Does the pain come and go, or has it been constant since it started?\" \"Does it get worse with exertion?\"  On and off.  5. DURATION: \"How long does it last\" (e.g., seconds, minutes, hours)  6. SEVERITY: \"How bad is the pain?\" (e.g., Scale 1-10; mild, moderate, or severe) 6.  - MILD (1-3): doesn't interfere with normal activities   - MODERATE (4-7): interferes with normal activities or awakens from sleep  - SEVERE (8-10): excruciating pain, unable to do any normal activities   7. CARDIAC RISK FACTORS: \"Do you have any history of heart problems or risk factors for heart disease?\" (e.g., angina, prior heart attack; diabetes, high blood pressure, high cholesterol, smoker, or strong family history of heart disease) H/O tricuspid valve replacement with mechanical valve, atrial flutter, mural thrombus of heart.  8. PULMONARY RISK FACTORS: \"Do you have any history of lung disease?\" (e.g., blood clots in lung, asthma, emphysema, birth control pills) None.  9. CAUSE: \"What do you think is causing the chest pain?\" Cardiac-related.  10. OTHER SYMPTOMS: \"Do you have any other symptoms?\" (e.g., dizziness, nausea, vomiting, sweating, fever, difficulty breathing, cough) Some dizziness.    "

## 2023-03-09 NOTE — TELEPHONE ENCOUNTER
After reviewing patient's chart and triage intake I called patient to inquire about her chest pain.    She said it started yesterdays and its a 'sharp' pain on the left side of her chest-it doesn't involve the area of her pacemaker. She said that area does not look red or swollen.    She says that the pain lasts for seconds and comes and goes. Interestingly, she says she had this a few weeks before she was hospitalized and it was more intense and lasted longer. While in the hospital, she did not experience it.      I asked how she slept last night a dn she stated she slept well and was not awakened by any sharp pain.    I told her that my suspicions are low that this is cardiac in nature.    She sees a new PCP tomorrow and I told her to bring this up during the visit so it can be checked out. She said that she would.    Her questions and concerns were addressed to he satisfaction.

## 2023-03-10 ENCOUNTER — ANTICOAGULATION THERAPY VISIT (OUTPATIENT)
Dept: ANTICOAGULATION | Facility: CLINIC | Age: 28
End: 2023-03-10

## 2023-03-10 ENCOUNTER — OFFICE VISIT (OUTPATIENT)
Dept: INTERNAL MEDICINE | Facility: CLINIC | Age: 28
End: 2023-03-10
Payer: COMMERCIAL

## 2023-03-10 VITALS
WEIGHT: 170.1 LBS | BODY MASS INDEX: 25.2 KG/M2 | DIASTOLIC BLOOD PRESSURE: 74 MMHG | OXYGEN SATURATION: 98 % | SYSTOLIC BLOOD PRESSURE: 128 MMHG | HEART RATE: 70 BPM | HEIGHT: 69 IN | TEMPERATURE: 98.4 F | RESPIRATION RATE: 16 BRPM

## 2023-03-10 DIAGNOSIS — I51.3 MURAL THROMBUS OF HEART: Primary | ICD-10-CM

## 2023-03-10 DIAGNOSIS — I48.92 ATRIAL FLUTTER, UNSPECIFIED TYPE (H): ICD-10-CM

## 2023-03-10 DIAGNOSIS — Z95.2 HISTORY OF TRICUSPID VALVE REPLACEMENT WITH MECHANICAL VALVE: ICD-10-CM

## 2023-03-10 DIAGNOSIS — D50.0 IRON DEFICIENCY ANEMIA DUE TO CHRONIC BLOOD LOSS: ICD-10-CM

## 2023-03-10 DIAGNOSIS — R07.89 CHEST PRESSURE: ICD-10-CM

## 2023-03-10 LAB
ANION GAP SERPL CALCULATED.3IONS-SCNC: 12 MMOL/L (ref 7–15)
ATRIAL RATE - MUSE: 258 BPM
BASOPHILS # BLD AUTO: 0 10E3/UL (ref 0–0.2)
BASOPHILS NFR BLD AUTO: 0 %
BUN SERPL-MCNC: 14.2 MG/DL (ref 6–20)
CALCIUM SERPL-MCNC: 9.2 MG/DL (ref 8.6–10)
CHLORIDE SERPL-SCNC: 105 MMOL/L (ref 98–107)
CREAT SERPL-MCNC: 0.65 MG/DL (ref 0.51–0.95)
DEPRECATED HCO3 PLAS-SCNC: 22 MMOL/L (ref 22–29)
DIASTOLIC BLOOD PRESSURE - MUSE: NORMAL MMHG
EOSINOPHIL # BLD AUTO: 0.1 10E3/UL (ref 0–0.7)
EOSINOPHIL NFR BLD AUTO: 2 %
ERYTHROCYTE [DISTWIDTH] IN BLOOD BY AUTOMATED COUNT: 21.6 % (ref 10–15)
GFR SERPL CREATININE-BSD FRML MDRD: >90 ML/MIN/1.73M2
GLUCOSE SERPL-MCNC: 88 MG/DL (ref 70–99)
HCT VFR BLD AUTO: 32.1 % (ref 35–47)
HGB BLD-MCNC: 9.4 G/DL (ref 11.7–15.7)
IMM GRANULOCYTES # BLD: 0 10E3/UL
IMM GRANULOCYTES NFR BLD: 0 %
INR PPP: 3.26 (ref 0.85–1.15)
INTERPRETATION ECG - MUSE: NORMAL
LYMPHOCYTES # BLD AUTO: 1.1 10E3/UL (ref 0.8–5.3)
LYMPHOCYTES NFR BLD AUTO: 30 %
MCH RBC QN AUTO: 23.6 PG (ref 26.5–33)
MCHC RBC AUTO-ENTMCNC: 29.3 G/DL (ref 31.5–36.5)
MCV RBC AUTO: 81 FL (ref 78–100)
MONOCYTES # BLD AUTO: 0.2 10E3/UL (ref 0–1.3)
MONOCYTES NFR BLD AUTO: 6 %
NEUTROPHILS # BLD AUTO: 2.2 10E3/UL (ref 1.6–8.3)
NEUTROPHILS NFR BLD AUTO: 61 %
P AXIS - MUSE: NORMAL DEGREES
PLATELET # BLD AUTO: 242 10E3/UL (ref 150–450)
POTASSIUM SERPL-SCNC: 4.1 MMOL/L (ref 3.4–5.3)
PR INTERVAL - MUSE: NORMAL MS
QRS DURATION - MUSE: 146 MS
QT - MUSE: 142 MS
QTC - MUSE: 235 MS
R AXIS - MUSE: 79 DEGREES
RBC # BLD AUTO: 3.99 10E6/UL (ref 3.8–5.2)
SODIUM SERPL-SCNC: 139 MMOL/L (ref 136–145)
SYSTOLIC BLOOD PRESSURE - MUSE: NORMAL MMHG
T AXIS - MUSE: 0 DEGREES
VENTRICULAR RATE- MUSE: 165 BPM
WBC # BLD AUTO: 3.6 10E3/UL (ref 4–11)

## 2023-03-10 PROCEDURE — 93010 ELECTROCARDIOGRAM REPORT: CPT | Performed by: INTERNAL MEDICINE

## 2023-03-10 PROCEDURE — 99495 TRANSJ CARE MGMT MOD F2F 14D: CPT | Performed by: INTERNAL MEDICINE

## 2023-03-10 PROCEDURE — 93005 ELECTROCARDIOGRAM TRACING: CPT | Performed by: INTERNAL MEDICINE

## 2023-03-10 PROCEDURE — 85025 COMPLETE CBC W/AUTO DIFF WBC: CPT | Performed by: INTERNAL MEDICINE

## 2023-03-10 PROCEDURE — 36415 COLL VENOUS BLD VENIPUNCTURE: CPT | Performed by: INTERNAL MEDICINE

## 2023-03-10 PROCEDURE — 85610 PROTHROMBIN TIME: CPT | Performed by: INTERNAL MEDICINE

## 2023-03-10 PROCEDURE — 80048 BASIC METABOLIC PNL TOTAL CA: CPT | Performed by: INTERNAL MEDICINE

## 2023-03-10 NOTE — ASSESSMENT & PLAN NOTE
Noted while she was admitted.  She received Venofer x2.  -Recheck CBC  -Hematology follow-up scheduled for 3/13

## 2023-03-10 NOTE — ASSESSMENT & PLAN NOTE
Patient presents with intermittent chest pressure over the last 2 days.  Similar to the chest pressure she was feeling prior to admission.  The chest pressure does not sound cardiac in nature, describes as intermittent pain that is brought about by moving her arms and slouching.  Only lasts for a few seconds and then resolves on its own.  Suspect it is muscular in nature, however given significant recent cardiac history we will do an EKG.   -EKG showed a paced rhythm, no ischemia noted  -Reassurance provided  -Patient is instructed that she will need to follow-up with myself or cardiology if the pressure begins to be persistent, is associated with any shortness of breath or palpitations, or is brought on consistently by exertion

## 2023-03-10 NOTE — ASSESSMENT & PLAN NOTE
Patient recently had prolonged admission for possible mechanical tricuspid valve thrombus.  She has had multiple echoes with increased gradients and stenosis of the valve with questionable thrombus.  Hematology, cardiology and cardiac surgery were all consulted while she was admitted.  Current plan is to have increased INR goal of 3.5-4 along with baby aspirin daily and repeat echo in a month or 2.  -Continue current plan, check INR today  -Radiology follow-up is scheduled for 3/20

## 2023-03-10 NOTE — LETTER
March 13, 2023      Silvia Otero  [address] [place] [address]        Dear ,    We are writing to inform you of your test results.    Your blood counts are improved after getting IV iron in the hospital. The hematologists will decide if you need to take oral iron or not. Your kidney function and electrolytes were normal.         Resulted Orders   Basic metabolic panel   Result Value Ref Range    Sodium 139 136 - 145 mmol/L    Potassium 4.1 3.4 - 5.3 mmol/L    Chloride 105 98 - 107 mmol/L    Carbon Dioxide (CO2) 22 22 - 29 mmol/L    Anion Gap 12 7 - 15 mmol/L    Urea Nitrogen 14.2 6.0 - 20.0 mg/dL    Creatinine 0.65 0.51 - 0.95 mg/dL    Calcium 9.2 8.6 - 10.0 mg/dL    Glucose 88 70 - 99 mg/dL    GFR Estimate >90 >60 mL/min/1.73m2      Comment:      eGFR calculated using 2021 CKD-EPI equation.   CBC with platelets and differential   Result Value Ref Range    WBC Count 3.6 (L) 4.0 - 11.0 10e3/uL    RBC Count 3.99 3.80 - 5.20 10e6/uL    Hemoglobin 9.4 (L) 11.7 - 15.7 g/dL    Hematocrit 32.1 (L) 35.0 - 47.0 %    MCV 81 78 - 100 fL    MCH 23.6 (L) 26.5 - 33.0 pg    MCHC 29.3 (L) 31.5 - 36.5 g/dL    RDW 21.6 (H) 10.0 - 15.0 %    Platelet Count 242 150 - 450 10e3/uL    % Neutrophils 61 %    % Lymphocytes 30 %    % Monocytes 6 %    % Eosinophils 2 %    % Basophils 0 %    % Immature Granulocytes 0 %    Absolute Neutrophils 2.2 1.6 - 8.3 10e3/uL    Absolute Lymphocytes 1.1 0.8 - 5.3 10e3/uL    Absolute Monocytes 0.2 0.0 - 1.3 10e3/uL    Absolute Eosinophils 0.1 0.0 - 0.7 10e3/uL    Absolute Basophils 0.0 0.0 - 0.2 10e3/uL    Absolute Immature Granulocytes 0.0 <=0.4 10e3/uL       If you have any questions or concerns, please call the clinic at the number listed above.       Sincerely,      America Christianson MD

## 2023-03-10 NOTE — PROGRESS NOTES
ANTICOAGULATION MANAGEMENT     Silvia Otero 27 year old female is on warfarin with subtherapeutic INR result. (Goal INR 3.5-4.0)    Recent labs: (last 7 days)     03/10/23  1101   INR 3.26*       ASSESSMENT       Source(s): Chart Review and Patient/Caregiver Call       Warfarin doses taken: Warfarin taken as instructed    Diet: No new diet changes identified    New illness, injury, or hospitalization: No    Medication/supplement changes: None noted    Signs or symptoms of bleeding or clotting: No    Previous INR: Subtherapeutic    Additional findings: None         PLAN     Recommended plan for no diet, medication or health factor changes affecting INR     Dosing Instructions: booster dose then Increase your warfarin dose (10.5% change) with next INR on Wednesday       Summary  As of 3/10/2023    Full warfarin instructions:  3/10: 20 mg; Otherwise 15 mg every day   Next INR check:  3/15/2023             Telephone call with Silvia who agrees to plan and repeated back plan correctly    Patient offered & declined to schedule next visit    Education provided:     Contact [phone] with any changes, questions or concerns.     Plan made with [facility] Pharmacist Cleopatra Navarro RN  Anticoagulation Clinic  3/10/2023    _______________________________________________________________________     Anticoagulation Episode Summary     Current INR goal:  Other - see comment   TTR:  --   Target end date:  Indefinite   Send INR reminders to:  [facility] HEART INR NURSE    Indications    Mural thrombus of heart [I51.3]  History of tricuspid valve replacement with mechanical valve [Z95.2]  Atrial flutter  unspecified type (H) [I48.92]           Comments:  INR goal 3.5-4         Anticoagulation Care Providers     Provider Role Specialty Phone number    Eduardo Araiza NP Referring Cardiovascular Disease [phone]    Renee Huynh NP Referring  [phone]

## 2023-03-10 NOTE — PROGRESS NOTES
Assessment & Plan   Problem List Items Addressed This Visit        Nervous and Auditory    Chest pressure     Patient presents with intermittent chest pressure over the last 2 days.  Similar to the chest pressure she was feeling prior to admission.  The chest pressure does not sound cardiac in nature, describes as intermittent pain that is brought about by moving her arms and slouching.  Only lasts for a few seconds and then resolves on its own.  Suspect it is muscular in nature, however given significant recent cardiac history we will do an EKG.   -EKG showed a paced rhythm, no ischemia noted  -Reassurance provided  -Patient is instructed that she will need to follow-up with myself or cardiology if the pressure begins to be persistent, is associated with any shortness of breath or palpitations, or is brought on consistently by exertion         Relevant Orders    EKG 12-lead, tracing only (Completed)       Circulatory    Mural thrombus of heart - Primary     Patient recently had prolonged admission for possible mechanical tricuspid valve thrombus.  She has had multiple echoes with increased gradients and stenosis of the valve with questionable thrombus.  Hematology, cardiology and cardiac surgery were all consulted while she was admitted.  Current plan is to have increased INR goal of 3.5-4 along with baby aspirin daily and repeat echo in a month or 2.  -Continue current plan, check INR today  -Radiology follow-up is scheduled for 3/20         Relevant Orders    Basic metabolic panel (Completed)    CBC with platelets and differential (Completed)    INR (Completed)    Atrial flutter, unspecified type (H)     Continues to be in a flutter on EKG today.  She is ventricularly paced with a rate in the 70s.  Is anticoagulated with warfarin.            Hematologic    Iron deficiency anemia due to chronic blood loss     Noted while she was admitted.  She received Venofer x2.  -Recheck CBC  -Hematology follow-up scheduled  "for 3/13            Other    History of tricuspid valve replacement with mechanical valve     History of atrial myxoma involving tricuspid valve requiring resection when patient 2-year-old (complicated by regurgitation and complete heart block). Tricuspid valve replaced 2016 with mechanical tricuspid valve. TTE 2/27/23 showed There is severe tricuspid stenosis. MG is 15 mm Hg with elevated PHTconsistent with severe mechanical prosthetic valve dysfunction as previously noted.  - Continue cardiology and CT surgery follow up   - Continue warfarin with INR goal 3.5-4           Ordering of each unique test  I spent a total of 43 minutes on the day of the visit.   Time spent doing chart review, history and exam, documentation and further activities per the note     MED REC REQUIRED  Post Medication Reconciliation Status:  Discharge medications reconciled, continue medications without change    BMI:   Estimated body mass index is 25.12 kg/m  as calculated from the following:    Height as of this encounter: 1.753 m (5' 9\").    Weight as of this encounter: 77.2 kg (170 lb 1.6 oz).   Weight management plan: Discussed healthy diet and exercise guidelines    Return in about 2 months (around 5/10/2023) for Routine preventive.    America Christianson MD  [facility] SY Vega is a 27 year old, presenting for the following health issues:  Hospital F/U (Pt states having chest pain left side, comes and goes, when I am doing something, a little sharp) and Establish Care    Patient was admitted 2/23 - 3/2 at [facility] after presenting with chest pressure.      She was found to be in atrial flutter with RVR, it was found that her pacemaker had reached ALLISON 9/2022 (originally placed in [facility] in 2016 for third-degree block). She then underwent generator replacement with EP on 2/24 and following that was 100% paced.    Admission was complicated by possible mechanical tricuspid valve " thrombus.  Was noted that she had not had INR checked for many months or on a regular basis prior to admission.  Echo in the ED showed possible tricuspid mechanical valve thrombus.  Follow-up HANK that day showed restricted leaflet movement and increased gradient but no obvious thrombus.  Repeat echo with therapeutic INR on 2/27 continue to demonstrate increased gradients and severe stenosis.  CV surgery was consulted and recommended consideration of lytics and a higher INR goal to treat the possible mechanical valve thrombus.  Hematology was also consulted.  Eventual plan was increased INR goal of 3.5-4 along with baby aspirin daily.  She will follow-up with cardiology 3/20.  She will also follow-up with hematology for anemia 3/13, she was given 2 doses of IV Venofer while admitted. Is not currently taking any oral iron supplement.     Chest pain/pressure stopped after she left the hospital. Started again 2 days ago. Feels if she is busy with hands or not sitting in the correct way (slouched). Will only last for a few seconds at a time and then goes away. Better if sitting up straight. Not feeling when she sleeps. No associated palpitations or shortness of breath. Has been walking, this does not bring the pain out.     Hospital Follow-up Visit:    Hospital/Nursing Home/IP Rehab Facility: [facility]  Date of Admission: 02/23/2023  Date of Discharge: 03/02/2023  Reason(s) for Admission: mural thrombus of heart    Was your hospitalization related to COVID-19? No   Problems taking medications regularly:  None  Medication changes since discharge: aspirin 81mg  Problems adhering to non-medication therapy:  None    Summary of hospitalization:  [facility] discharge summary reviewed  Diagnostic Tests/Treatments reviewed.  Follow up needed: CBC, BMP and INR today  Other Healthcare Providers Involved in Patient s Care:         Specialist appointment - cardiology, CT surgery and  "hematology  Update since discharge: stable.       Plan of care communicated with patient     Review of Systems   Constitutional, HEENT, cardiovascular, pulmonary, GI, , musculoskeletal, neuro, skin, endocrine and psych systems are negative, except as otherwise noted.      Objective    /74 (BP Location: Right arm, Patient Position: Sitting, Cuff Size: Adult Regular)   Pulse 70   Temp 98.4  F (36.9  C) (Oral)   Resp 16   Ht 1.753 m (5' 9\")   Wt 77.2 kg (170 lb 1.6 oz)   LMP 02/23/2023 (Within Days)   SpO2 98%   BMI 25.12 kg/m    Body mass index is 25.12 kg/m .  Physical Exam   GENERAL: healthy, alert and no distress  EYES: Eyes grossly normal to inspection and conjunctivae and sclerae normal  HENT: nose and mouth without ulcers or lesions  NECK: no adenopathy, no asymmetry, masses, or scars and thyroid normal to palpation  RESP: lungs clear to auscultation - no rales, rhonchi or wheezes  BREAST: normal without masses, tenderness or nipple discharge and no palpable axillary masses or adenopathy  CV: regular rate and rhythm, 3/6 diastolic murmur, no peripheral edema and peripheral pulses strong  ABDOMEN: soft, nontender, no hepatosplenomegaly, no masses and bowel sounds normal  MS: no gross musculoskeletal defects noted, no edema  SKIN: no suspicious lesions or rashes on exposed skin   NEURO: Normal strength and tone, mentation intact and speech normal  PSYCH: mentation appears normal, affect slightly anxious     EKG - on my review, 100% paced, underlying flutter waves present, ventricular paced rate is closer to 75 bpm    Results for orders placed or performed in visit on 03/10/23 (from the past 24 hour(s))   EKG 12-lead, tracing only   Result Value Ref Range    Systolic Blood Pressure  mmHg    Diastolic Blood Pressure  mmHg    Ventricular Rate 165 BPM    Atrial Rate 258 BPM    MI Interval  ms    QRS Duration 146 ms     ms    QTc 235 ms    P Axis  degrees    R AXIS 79 degrees    T Axis 0 degrees "    Interpretation ECG       Undetermined rhythm  Non-specific intra-ventricular conduction block  Nonspecific T wave abnormality  Abnormal ECG  When compared with ECG of 23-FEB-2023 18:04,  Current undetermined rhythm precludes rhythm comparison, needs review  QRS duration has increased     Basic metabolic panel   Result Value Ref Range    Sodium 139 136 - 145 mmol/L    Potassium 4.1 3.4 - 5.3 mmol/L    Chloride 105 98 - 107 mmol/L    Carbon Dioxide (CO2) 22 22 - 29 mmol/L    Anion Gap 12 7 - 15 mmol/L    Urea Nitrogen 14.2 6.0 - 20.0 mg/dL    Creatinine 0.65 0.51 - 0.95 mg/dL    Calcium 9.2 8.6 - 10.0 mg/dL    Glucose 88 70 - 99 mg/dL    GFR Estimate >90 >60 mL/min/1.73m2   CBC with platelets and differential    Narrative    The following orders were created for panel order CBC with platelets and differential.  Procedure                               Abnormality         Status                     ---------                               -----------         ------                     CBC with platelets and d...[[id number]]  Abnormal            Final result                 Please view results for these tests on the individual orders.   INR   Result Value Ref Range    INR 3.26 (H) 0.85 - 1.15   CBC with platelets and differential   Result Value Ref Range    WBC Count 3.6 (L) 4.0 - 11.0 10e3/uL    RBC Count 3.99 3.80 - 5.20 10e6/uL    Hemoglobin 9.4 (L) 11.7 - 15.7 g/dL    Hematocrit 32.1 (L) 35.0 - 47.0 %    MCV 81 78 - 100 fL    MCH 23.6 (L) 26.5 - 33.0 pg    MCHC 29.3 (L) 31.5 - 36.5 g/dL    RDW 21.6 (H) 10.0 - 15.0 %    Platelet Count 242 150 - 450 10e3/uL    % Neutrophils 61 %    % Lymphocytes 30 %    % Monocytes 6 %    % Eosinophils 2 %    % Basophils 0 %    % Immature Granulocytes 0 %    Absolute Neutrophils 2.2 1.6 - 8.3 10e3/uL    Absolute Lymphocytes 1.1 0.8 - 5.3 10e3/uL    Absolute Monocytes 0.2 0.0 - 1.3 10e3/uL    Absolute Eosinophils 0.1 0.0 - 0.7 10e3/uL    Absolute Basophils 0.0 0.0 - 0.2 10e3/uL     Absolute Immature Granulocytes 0.0 <=0.4 10e3/uL

## 2023-03-10 NOTE — ASSESSMENT & PLAN NOTE
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 Tricuspid valve replaced 2016 with mechanical tricuspid valve. TTE 2/27/23 showed There is severe tricuspid stenosis. MG is 15 mm Hg with elevated PHTconsistent with severe mechanical prosthetic valve dysfunction as previously noted.  - Continue cardiology and CT surgery follow up   - Continue warfarin with INR goal 3.5-4

## 2023-03-10 NOTE — ASSESSMENT & PLAN NOTE
Continues to be in a flutter on EKG today.  She is ventricularly paced with a rate in the 70s.  Is anticoagulated with warfarin.

## 2023-03-11 NOTE — RESULT ENCOUNTER NOTE
Send patient letter with results and this message:     Your blood counts are improved after getting IV iron in the hospital. The hematologists will decide if you need to take oral iron or not. Your kidney function and electrolytes were normal.

## 2023-03-13 ENCOUNTER — PRE VISIT (OUTPATIENT)
Dept: ONCOLOGY | Facility: CLINIC | Age: 28
End: 2023-03-13
Payer: COMMERCIAL

## 2023-03-13 ENCOUNTER — ONCOLOGY VISIT (OUTPATIENT)
Dept: ONCOLOGY | Facility: CLINIC | Age: 28
End: 2023-03-13
Attending: INTERNAL MEDICINE
Payer: COMMERCIAL

## 2023-03-13 VITALS
BODY MASS INDEX: 25.18 KG/M2 | HEIGHT: 69 IN | HEART RATE: 70 BPM | SYSTOLIC BLOOD PRESSURE: 116 MMHG | RESPIRATION RATE: 16 BRPM | DIASTOLIC BLOOD PRESSURE: 84 MMHG | WEIGHT: 170 LBS | OXYGEN SATURATION: 98 %

## 2023-03-13 DIAGNOSIS — D50.8 OTHER IRON DEFICIENCY ANEMIA: ICD-10-CM

## 2023-03-13 PROCEDURE — 99204 OFFICE O/P NEW MOD 45 MIN: CPT | Performed by: INTERNAL MEDICINE

## 2023-03-13 PROCEDURE — G0463 HOSPITAL OUTPT CLINIC VISIT: HCPCS | Performed by: INTERNAL MEDICINE

## 2023-03-13 ASSESSMENT — PAIN SCALES - GENERAL: PAINLEVEL: NO PAIN (0)

## 2023-03-13 NOTE — PROGRESS NOTES
[facility]    Hematology/Oncology New Patient Note      Today's Date: 03/13/23    Reason for Consult: iron deficiency anemia    HISTORY OF PRESENT ILLNESS: Silvia Otero is a 27 year old female who presents history of tricuspid valve stenosis seen in the hospital and iron deficiency anemia.  She is being closely followed by cardiology.  Her menstruation is heavy.  She is taking oral iron as of a few days ago after discharge.  She was prescribed over-the-counter iron.  She clinically feels well.    For details of her previous history see my consult note dictated on 3/1/2023    REVIEW OF SYSTEMS:   14 point ROS was reviewed and is negative other than as noted above in HPI.       HOME MEDICATIONS:  Current Outpatient Medications   Medication Sig Dispense Refill     aspirin (ASA) 81 MG EC tablet Take 1 tablet (81 mg) by mouth daily 100 tablet 0     warfarin ANTICOAGULANT (COUMADIN) 10 MG tablet Take by mouth See Admin Instructions 10 mg on Mondays thru Thursdays, then 15 mg Fridays thru Sundays.           ALLERGIES:  No Known Allergies      PAST MEDICAL HISTORY:  No past medical history on file.      PAST SURGICAL HISTORY:  Past Surgical History:   Procedure Laterality Date     EP PACEMAKER GENERATOR REPLACEMENT- DUAL N/A 2/24/2023    Procedure: Pacemaker Generator Replacement Dual;  Surgeon: Jordon Regan MD;  Location:  HEART CARDIAC CATH LAB         SOCIAL HISTORY:  Social History     Socioeconomic History     Marital status:      Spouse name: Not on file     Number of children: Not on file     Years of education: Not on file     Highest education level: Not on file   Occupational History     Not on file   Tobacco Use     Smoking status: Never     Smokeless tobacco: Never   Vaping Use     Vaping Use: Never used   Substance and Sexual Activity     Alcohol use: Not on file     Drug use: Not on file     Sexual activity: Not on file   Other Topics Concern     Not on file  "  Social History Narrative     Not on file     Social Determinants of Health     Financial Resource Strain: Not on file   Food Insecurity: Not on file   Transportation Needs: Not on file   Physical Activity: Not on file   Stress: Not on file   Social Connections: Not on file   Intimate Partner Violence: Not on file   Housing Stability: Not on file         FAMILY HISTORY:  No family history on file.      PHYSICAL EXAM:  Vital signs:  /84   Pulse 70   Resp 16   Ht 1.753 m (5' 9\")   Wt 77.1 kg (170 lb)   LMP 2023 (Within Days)   SpO2 98%   BMI 25.10 kg/m     ECO  GENERAL/CONSTITUTIONAL: No acute distress.  EYES: Pupils are equal, round, and react to light and accommodation. Extraocular movements intact.  No scleral icterus.  ENT/MOUTH: Neck supple. Oropharynx clear, no mucositis.  LYMPH: No anterior cervical, posterior cervical, supraclavicular, axillary or inguinal adenopathy.   RESPIRATORY: Clear to auscultation bilaterally. No crackles or wheezing.   CARDIOVASCULAR: Regular rate and rhythm without murmurs, gallops, or rubs.  GASTROINTESTINAL: No hepatosplenomegaly, masses, or tenderness. The patient has normal bowel sounds. No guarding.  No distention.  MUSCULOSKELETAL: Warm and well-perfused, no cyanosis, clubbing, or edema.  NEUROLOGIC: Cranial nerves II-XII are intact. Alert, oriented, answers questions appropriately.  INTEGUMENTARY: No rashes or jaundice.  GAIT: Steady, does not use assistive device      LABS:  CBC RESULTS:   Recent Labs   Lab Test 03/10/23  1101   WBC 3.6*   RBC 3.99   HGB 9.4*   HCT 32.1*   MCV 81   MCH 23.6*   MCHC 29.3*   RDW 21.6*          Recent Labs   Lab Test 03/10/23  1101 23  1432    139   POTASSIUM 4.1 4.0   CHLORIDE 105 101   CO2 22 23   ANIONGAP 12 15   GLC 88 102*   BUN 14.2 17.9   CR 0.65 0.73   DENIS 9.2 9.5     Iron saturation index 5% on 2023    PATHOLOGY:  N/A    IMAGING:  N/A    ASSESSMENT/PLAN:  Silvia Otero is a 27 year old " female with history urgency anemia due to heavy menstruation she is also on chronic Coumadin due to her mechanical valve    1.  Iron deficiency anemia: Likely due to heavy menstruation.  She is currently on Oral iron over-the-counter and tolerating it well.  Have asked her to take 1 tablet every other day to increase her absorption and to take it on an empty stomach.  Have asked her to follow-up with her IFEANYI Maranda in 4 weeks with repeat labs a week prior to the visit with her to see if her levels are improving otherwise we will consider IV iron.    2.  Mechanical valve stenosis: She closely follows up with cardiology for her tricuspid valve stenosis.  On chronic anticoagulation with Coumadin.  Cardiology is managing her INR        Tarik Lovett MD  Hematology/Oncology  [facility]

## 2023-03-13 NOTE — LETTER
"    3/13/2023         RE: Silvia Otero  [address] [place] [address]        Dear Colleague,    Thank you for referring your patient, Silvia Otero, to the [facility]. Please see a copy of my visit note below.    Oncology Rooming Note    March 13, 2023 2:07 PM   Silvia Otero is a 27 year old female who presents for:    Chief Complaint   Patient presents with     Oncology Clinic Visit     New Patient     Initial Vitals: /84   Pulse 70   Resp 16   Ht 1.753 m (5' 9\")   Wt 77.1 kg (170 lb)   LMP 02/23/2023 (Within Days)   SpO2 98%   BMI 25.10 kg/m   Estimated body mass index is 25.1 kg/m  as calculated from the following:    Height as of this encounter: 1.753 m (5' 9\").    Weight as of this encounter: 77.1 kg (170 lb). Body surface area is 1.94 meters squared.  No Pain (0) Comment: Data Unavailable   Patient's last menstrual period was 02/23/2023 (within days).  Allergies reviewed: Yes  Medications reviewed: Yes    Medications: Medication refills not needed today.  Pharmacy name entered into magnetU: [facility] - [place], [place] - [address] [place] [address]    Clinical concerns: None       Fabiana Salguero MA                [facility]    Hematology/Oncology New Patient Note      Today's Date: 03/13/23    Reason for Consult: iron deficiency anemia    HISTORY OF PRESENT ILLNESS: Silvia Otero is a 27 year old female who presents history of tricuspid valve stenosis seen in the hospital and iron deficiency anemia.  She is being closely followed by cardiology.  Her menstruation is heavy.  She is taking oral iron as of a few days ago after discharge.  She was prescribed over-the-counter iron.  She clinically feels well.    For details of her previous history see my consult note dictated on 3/1/2023    REVIEW OF SYSTEMS:   14 point ROS was reviewed and is negative other than as noted above in HPI.       HOME MEDICATIONS:  Current Outpatient " "Medications   Medication Sig Dispense Refill     aspirin (ASA) 81 MG EC tablet Take 1 tablet (81 mg) by mouth daily 100 tablet 0     warfarin ANTICOAGULANT (COUMADIN) 10 MG tablet Take by mouth See Admin Instructions 10 mg on  thru , then 15 mg  thru Sundays.           ALLERGIES:  No Known Allergies      PAST MEDICAL HISTORY:  No past medical history on file.      PAST SURGICAL HISTORY:  Past Surgical History:   Procedure Laterality Date     EP PACEMAKER GENERATOR REPLACEMENT- DUAL N/A 2023    Procedure: Pacemaker Generator Replacement Dual;  Surgeon: Jordon Regan MD;  Location:  HEART CARDIAC CATH LAB         SOCIAL HISTORY:  Social History     Socioeconomic History     Marital status:      Spouse name: Not on file     Number of children: Not on file     Years of education: Not on file     Highest education level: Not on file   Occupational History     Not on file   Tobacco Use     Smoking status: Never     Smokeless tobacco: Never   Vaping Use     Vaping Use: Never used   Substance and Sexual Activity     Alcohol use: Not on file     Drug use: Not on file     Sexual activity: Not on file   Other Topics Concern     Not on file   Social History Narrative     Not on file     Social Determinants of Health     Financial Resource Strain: Not on file   Food Insecurity: Not on file   Transportation Needs: Not on file   Physical Activity: Not on file   Stress: Not on file   Social Connections: Not on file   Intimate Partner Violence: Not on file   Housing Stability: Not on file         FAMILY HISTORY:  No family history on file.      PHYSICAL EXAM:  Vital signs:  /84   Pulse 70   Resp 16   Ht 1.753 m (5' 9\")   Wt 77.1 kg (170 lb)   LMP 2023 (Within Days)   SpO2 98%   BMI 25.10 kg/m     ECO  GENERAL/CONSTITUTIONAL: No acute distress.  EYES: Pupils are equal, round, and react to light and accommodation. Extraocular movements intact.  No scleral " icterus.  ENT/MOUTH: Neck supple. Oropharynx clear, no mucositis.  LYMPH: No anterior cervical, posterior cervical, supraclavicular, axillary or inguinal adenopathy.   RESPIRATORY: Clear to auscultation bilaterally. No crackles or wheezing.   CARDIOVASCULAR: Regular rate and rhythm without murmurs, gallops, or rubs.  GASTROINTESTINAL: No hepatosplenomegaly, masses, or tenderness. The patient has normal bowel sounds. No guarding.  No distention.  MUSCULOSKELETAL: Warm and well-perfused, no cyanosis, clubbing, or edema.  NEUROLOGIC: Cranial nerves II-XII are intact. Alert, oriented, answers questions appropriately.  INTEGUMENTARY: No rashes or jaundice.  GAIT: Steady, does not use assistive device      LABS:  CBC RESULTS:   Recent Labs   Lab Test 03/10/23  1101   WBC 3.6*   RBC 3.99   HGB 9.4*   HCT 32.1*   MCV 81   MCH 23.6*   MCHC 29.3*   RDW 21.6*          Recent Labs   Lab Test 03/10/23  1101 03/03/23  1432    139   POTASSIUM 4.1 4.0   CHLORIDE 105 101   CO2 22 23   ANIONGAP 12 15   GLC 88 102*   BUN 14.2 17.9   CR 0.65 0.73   DENIS 9.2 9.5     Iron saturation index 5% on 2/24/2023    PATHOLOGY:  N/A    IMAGING:  N/A    ASSESSMENT/PLAN:  Silvia Otero is a 27 year old female with history urgency anemia due to heavy menstruation she is also on chronic Coumadin due to her mechanical valve    1.  Iron deficiency anemia: Likely due to heavy menstruation.  She is currently on Oral iron over-the-counter and tolerating it well.  Have asked her to take 1 tablet every other day to increase her absorption and to take it on an empty stomach.  Have asked her to follow-up with her IFEANYI Maranda in 4 weeks with repeat labs a week prior to the visit with her to see if her levels are improving otherwise we will consider IV iron.    2.  Mechanical valve stenosis: She closely follows up with cardiology for her tricuspid valve stenosis.  On chronic anticoagulation with Coumadin.  Cardiology is managing her INR        Tarik  [address]  Hematology/Oncology  [facility]      Again, thank you for allowing me to participate in the care of your patient.        Sincerely,        Tarik Lovett MD

## 2023-03-13 NOTE — PROGRESS NOTES
"Oncology Rooming Note    March 13, 2023 2:07 PM   Silvia Otero is a 27 year old female who presents for:    Chief Complaint   Patient presents with     Oncology Clinic Visit     New Patient     Initial Vitals: /84   Pulse 70   Resp 16   Ht 1.753 m (5' 9\")   Wt 77.1 kg (170 lb)   LMP 02/23/2023 (Within Days)   SpO2 98%   BMI 25.10 kg/m   Estimated body mass index is 25.1 kg/m  as calculated from the following:    Height as of this encounter: 1.753 m (5' 9\").    Weight as of this encounter: 77.1 kg (170 lb). Body surface area is 1.94 meters squared.  No Pain (0) Comment: Data Unavailable   Patient's last menstrual period was 02/23/2023 (within days).  Allergies reviewed: Yes  Medications reviewed: Yes    Medications: Medication refills not needed today.  Pharmacy name entered into Measur[employer]: [id number] - [place], [place] - [address] [facility]    Clinical concerns: None       Fabiana Salguero MA              "

## 2023-03-15 LAB
MDC_IDC_LEAD_IMPLANT_DT: NORMAL
MDC_IDC_LEAD_LOCATION: NORMAL
MDC_IDC_LEAD_LOCATION_DETAIL_1: NORMAL
MDC_IDC_LEAD_MFG: NORMAL
MDC_IDC_LEAD_MODEL: NORMAL
MDC_IDC_LEAD_POLARITY_TYPE: NORMAL
MDC_IDC_LEAD_SERIAL: NORMAL
MDC_IDC_MSMT_BATTERY_REMAINING_LONGEVITY: 70 MO
MDC_IDC_MSMT_BATTERY_STATUS: NORMAL
MDC_IDC_MSMT_BATTERY_VOLTAGE: 3.05 V
MDC_IDC_MSMT_LEADCHNL_RV_IMPEDANCE_VALUE: 212.5 OHM
MDC_IDC_MSMT_LEADCHNL_RV_PACING_THRESHOLD_AMPLITUDE: 1.75 V
MDC_IDC_MSMT_LEADCHNL_RV_PACING_THRESHOLD_AMPLITUDE: 2 V
MDC_IDC_MSMT_LEADCHNL_RV_PACING_THRESHOLD_PULSEWIDTH: 1 MS
MDC_IDC_MSMT_LEADCHNL_RV_PACING_THRESHOLD_PULSEWIDTH: 1 MS
MDC_IDC_PG_IMPLANT_DTM: NORMAL
MDC_IDC_PG_MFG: NORMAL
MDC_IDC_PG_MODEL: NORMAL
MDC_IDC_PG_SERIAL: NORMAL
MDC_IDC_PG_TYPE: NORMAL
MDC_IDC_SESS_CLINIC_NAME: NORMAL
MDC_IDC_SESS_DTM: NORMAL
MDC_IDC_SESS_TYPE: NORMAL
MDC_IDC_SET_BRADY_HYSTRATE: NORMAL
MDC_IDC_SET_BRADY_LOWRATE: 70 {BEATS}/MIN
MDC_IDC_SET_BRADY_MAX_SENSOR_RATE: 130 {BEATS}/MIN
MDC_IDC_SET_BRADY_MODE: NORMAL
MDC_IDC_SET_BRADY_NIGHT_RATE: NORMAL
MDC_IDC_SET_LEADCHNL_RV_PACING_AMPLITUDE: 2.25 V
MDC_IDC_SET_LEADCHNL_RV_PACING_CAPTURE_MODE: NORMAL
MDC_IDC_SET_LEADCHNL_RV_PACING_POLARITY: NORMAL
MDC_IDC_SET_LEADCHNL_RV_PACING_PULSEWIDTH: 1 MS
MDC_IDC_SET_LEADCHNL_RV_SENSING_POLARITY: NORMAL
MDC_IDC_SET_LEADCHNL_RV_SENSING_SENSITIVITY: 2 MV
MDC_IDC_STAT_BRADY_RV_PERCENT_PACED: 98 %

## 2023-03-16 ENCOUNTER — TELEPHONE (OUTPATIENT)
Dept: ANTICOAGULATION | Facility: CLINIC | Age: 28
End: 2023-03-16
Payer: COMMERCIAL

## 2023-03-16 NOTE — TELEPHONE ENCOUNTER
ANTICOAGULATION     Silvia Otero is overdue for INR check.      Spoke with patient and scheduled lab appointment on 3/17    Francesca Estrada RN

## 2023-03-17 ENCOUNTER — ANTICOAGULATION THERAPY VISIT (OUTPATIENT)
Dept: ANTICOAGULATION | Facility: CLINIC | Age: 28
End: 2023-03-17

## 2023-03-17 ENCOUNTER — LAB (OUTPATIENT)
Dept: LAB | Facility: CLINIC | Age: 28
End: 2023-03-17
Payer: COMMERCIAL

## 2023-03-17 DIAGNOSIS — I48.92 ATRIAL FLUTTER, UNSPECIFIED TYPE (H): ICD-10-CM

## 2023-03-17 DIAGNOSIS — I51.3 MURAL THROMBUS OF HEART: Primary | ICD-10-CM

## 2023-03-17 DIAGNOSIS — Z95.2 HISTORY OF TRICUSPID VALVE REPLACEMENT WITH MECHANICAL VALVE: ICD-10-CM

## 2023-03-17 DIAGNOSIS — I51.3 MURAL THROMBUS OF HEART: ICD-10-CM

## 2023-03-17 LAB — INR BLD: 4.7 (ref 0.9–1.1)

## 2023-03-17 PROCEDURE — 36415 COLL VENOUS BLD VENIPUNCTURE: CPT

## 2023-03-17 PROCEDURE — 85610 PROTHROMBIN TIME: CPT

## 2023-03-17 NOTE — CONFIDENTIAL NOTE
Thank you for the update. Agree with recommendation for evaluation in the emergency department. She should go to [facility] given complex history with congenital tricuspid valve issue and history of mechanical TVR. Hany Araiza APRN, CNP

## 2023-03-17 NOTE — PROGRESS NOTES
ANTICOAGULATION MANAGEMENT     Silvia Otero 27 year old female is on warfarin with supratherapeutic INR result. 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 States she tried to call the clinic but no one answered, she did not go to the ER for evaluation. Symptoms resolved Wednesday night, no repeat symptoms since then.    Previous INR: Subtherapeutic    Additional findings: None         PLAN     Recommended plan for no diet, medication or health factor changes affecting INR     Dosing Instructions: decrease your warfarin dose (9.5% change) with next INR in 1 week       Summary  As of 3/17/2023    Full warfarin instructions:  10 mg every Mon, Fri; 15 mg all other days   Next INR check:  3/24/2023             Telephone call with Silvia who agrees to plan and repeated back plan correctly    Silvia wants to check INR Monday when she is out for ECHO.    Education provided:     Symptom monitoring: monitoring for bleeding signs and symptoms, monitoring for clotting signs and symptoms, monitoring for stroke signs and symptoms and when to seek medical attention/emergency care    Contact [phone] with any changes, questions or concerns.     Plan made with [facility] Pharmacist Zelda Navarro, RN  Anticoagulation Clinic  3/17/2023    _______________________________________________________________________     Anticoagulation Episode Summary     Current INR goal:  Other - see  comment   TTR:  0.0 % (5 d)   Target end date:  Indefinite   Send INR reminders to:  [facility] HEART INR NURSE    Indications    Mural thrombus of heart [I51.3]  History of tricuspid valve replacement with mechanical valve [Z95.2]  Atrial flutter  unspecified type (H) [I48.92]           Comments:  INR goal 3.5-4         Anticoagulation Care Providers     Provider Role Specialty Phone number    Eduardo Araiza NP Referring Cardiovascular Disease [phone]    Renee Huynh NP Referring  [phone]

## 2023-03-20 ENCOUNTER — HOSPITAL ENCOUNTER (OUTPATIENT)
Dept: CARDIOLOGY | Facility: CLINIC | Age: 28
Discharge: HOME | End: 2023-03-20
Attending: NURSE PRACTITIONER | Admitting: NURSE PRACTITIONER
Payer: COMMERCIAL

## 2023-03-20 DIAGNOSIS — Z95.2 HISTORY OF TRICUSPID VALVE REPLACEMENT WITH MECHANICAL VALVE: ICD-10-CM

## 2023-03-20 LAB — LVEF ECHO: NORMAL

## 2023-03-20 PROCEDURE — 93306 TTE W/DOPPLER COMPLETE: CPT

## 2023-03-20 PROCEDURE — 93306 TTE W/DOPPLER COMPLETE: CPT | Mod: 26 | Performed by: INTERNAL MEDICINE

## 2023-03-20 NOTE — PROGRESS NOTES
Me  to Eduardo Araiza NP         3:50 PM   Do you want me to call her and recommend she go in now?   She has no current symptoms so I had advised she go in if symptoms return.     Thanks,   DAXA Hansen     March 20, 2023  Eduardo Araiza NP  to Me    AP     8:47 AM   Apologies for the delayed reply. That sounds reasonable. Coming in for repeat echo today and would continue close monitoring for INRs but should go in if any recurrent issues given complex hx with her tricuspid valve.     Thank you!

## 2023-03-22 ENCOUNTER — TELEPHONE (OUTPATIENT)
Dept: CARDIOLOGY | Facility: CLINIC | Age: 28
End: 2023-03-22
Payer: COMMERCIAL

## 2023-03-22 DIAGNOSIS — Z95.2 HISTORY OF TRICUSPID VALVE REPLACEMENT WITH MECHANICAL VALVE: Primary | ICD-10-CM

## 2023-03-22 DIAGNOSIS — I07.9 TRICUSPID VALVE DISEASE: ICD-10-CM

## 2023-03-22 NOTE — TELEPHONE ENCOUNTER
See tele encounter 03/22/23 - pt contacted to review recent result and recommendation for eval through CV surgery  HAYDEE Beard RN, BSN.

## 2023-03-22 NOTE — TELEPHONE ENCOUNTER
[facility] Call Center    Phone Message    May a detailed message be left on voicemail: yes     Reason for Call: Other: Pt would like a call back to discuss CVTS referral and what exactly she is being referred for.  Pt is concerned with not knowing what this is about.     Action Taken: Other: cardio    Travel Screening: Not Applicable

## 2023-03-22 NOTE — TELEPHONE ENCOUNTER
Call placed to pt to review results and recommendations:     Interpretation Summary     1. Left ventricular systolic function is normal. The visual ejection fraction  is 60-65%.  2. The right ventricle is mildly dilated. The right ventricular systolic  function is mild to moderately reduced.  3. The right atrium is moderately dilated.  4. S/P mechanical TVR; leaflets poorly visualized. Elevated mean gradient 15  mmHg at HR 70 bpm worrisome for prosthesis malfunction.  Compared to the piror study on 2/27/2023, no change in TV gradient      ----- Message from Eduardo Araiza NP sent at 3/22/2023  8:49 AM CDT -----  Please call Silvia to update her that echocardiogram findings from earlier this week were reviewed with Dr. Diego. She continues to have severe tricuspid stenosis despite the trial of keeping her INR target higher. Recommend referral to CV surgery at the [facility] to discuss options for further evaluation and management. I placed an order for the referral. Could you please help her coordinate scheduling for this or provide her with contact info? She can cancel the upcoming visit with Graciela and instead just schedule a visit with CV surgery. If she has recurrent or worsening symptoms in the meantime, would recommend evaluation in the ER at [facility]. Thank you!      Pt is now scheduled with CV surgery at the [facility] to review options for treatment to valve. Pt advised we will cancel OV with cardiology clinic at this time. Pt advised to reach out with concerns / questions as needed.   HAYDEE Beard RN, BSN. 03/22/23 9:54 AM

## 2023-03-27 ENCOUNTER — TELEPHONE (OUTPATIENT)
Dept: ANTICOAGULATION | Facility: CLINIC | Age: 28
End: 2023-03-27
Payer: COMMERCIAL

## 2023-03-27 NOTE — TELEPHONE ENCOUNTER
ANTICOAGULATION     Silvia Otero is overdue for INR check.      Spoke with Silvia and scheduled lab appointment on 3/29    Rebecca Barrientos RN

## 2023-03-29 ENCOUNTER — LAB (OUTPATIENT)
Dept: LAB | Facility: CLINIC | Age: 28
End: 2023-03-29
Payer: COMMERCIAL

## 2023-03-29 ENCOUNTER — ANTICOAGULATION THERAPY VISIT (OUTPATIENT)
Dept: ANTICOAGULATION | Facility: CLINIC | Age: 28
End: 2023-03-29

## 2023-03-29 DIAGNOSIS — I51.3 MURAL THROMBUS OF HEART: Primary | ICD-10-CM

## 2023-03-29 DIAGNOSIS — Z95.2 HISTORY OF TRICUSPID VALVE REPLACEMENT WITH MECHANICAL VALVE: ICD-10-CM

## 2023-03-29 DIAGNOSIS — I48.92 ATRIAL FLUTTER, UNSPECIFIED TYPE (H): ICD-10-CM

## 2023-03-29 DIAGNOSIS — I51.3 MURAL THROMBUS OF HEART: ICD-10-CM

## 2023-03-29 LAB — INR BLD: 4.2 (ref 0.9–1.1)

## 2023-03-29 PROCEDURE — 36416 COLLJ CAPILLARY BLOOD SPEC: CPT

## 2023-03-29 PROCEDURE — 85610 PROTHROMBIN TIME: CPT

## 2023-03-29 NOTE — PROGRESS NOTES
ANTICOAGULATION MANAGEMENT     Silvia Otero 27 year old female is on warfarin with supratherapeutic INR result. (Goal INR 3.5-4.0)    Recent labs: (last 7 days)     03/29/23  1109   INR 4.2*       ASSESSMENT       Source(s): Chart Review and Patient/Caregiver Call       Warfarin doses taken: Warfarin taken as instructed    Diet: No new diet changes identified    New illness, injury, or hospitalization: No    Medication/supplement changes: None noted    Signs or symptoms of bleeding or clotting: No    Previous INR: Supratherapeutic    Additional findings: None         PLAN     Recommended plan for no diet, medication or health factor changes affecting INR     Dosing Instructions: decrease your warfarin dose (5.3% change) with next INR in 7-10 days    Summary  As of 3/29/2023    Full warfarin instructions:  10 mg every Mon, Wed, Fri; 15 mg all other days   Next INR check:  4/10/2023             Telephone call with Silvia who agrees to plan and repeated back plan correctly    Will check with other labs on 4/10    Education provided:     Contact [phone] with any changes, questions or concerns.     Plan made with [facility] Pharmacist Zelda Navarro, RN  Anticoagulation Clinic  3/29/2023    _______________________________________________________________________     Anticoagulation Episode Summary     Current INR goal:  Other - see comment   TTR:  0.0 % (2.4 wk)   Target end date:  Indefinite   Send INR reminders to:  [facility] HEART INR NURSE    Indications    Mural thrombus of heart [I51.3]  History of tricuspid valve replacement with mechanical valve [Z95.2]  Atrial flutter  unspecified type (H) [I48.92]           Comments:  INR goal 3.5-4         Anticoagulation Care Providers     Provider Role Specialty Phone number    Eduardo Araiza NP Referring Cardiovascular Disease [phone]    Renee Huynh NP Referring  [phone]

## 2023-04-03 ENCOUNTER — OFFICE VISIT (OUTPATIENT)
Dept: CARDIOLOGY | Facility: CLINIC | Age: 28
End: 2023-04-03
Attending: NURSE PRACTITIONER
Payer: COMMERCIAL

## 2023-04-03 VITALS
SYSTOLIC BLOOD PRESSURE: 124 MMHG | WEIGHT: 169.4 LBS | DIASTOLIC BLOOD PRESSURE: 82 MMHG | OXYGEN SATURATION: 96 % | HEART RATE: 74 BPM | BODY MASS INDEX: 25.02 KG/M2

## 2023-04-03 DIAGNOSIS — Z95.2 HISTORY OF TRICUSPID VALVE REPLACEMENT WITH MECHANICAL VALVE: ICD-10-CM

## 2023-04-03 DIAGNOSIS — I07.9 TRICUSPID VALVE DISEASE: ICD-10-CM

## 2023-04-03 PROCEDURE — G0463 HOSPITAL OUTPT CLINIC VISIT: HCPCS | Performed by: SURGERY

## 2023-04-03 PROCEDURE — 99207 PR NON-BILLABLE SERV PER CHARTING: CPT | Performed by: SURGERY

## 2023-04-03 ASSESSMENT — PAIN SCALES - GENERAL: PAINLEVEL: NO PAIN (0)

## 2023-04-03 NOTE — PATIENT INSTRUCTIONS
You were seen today in the [facility]                     Cardiothoracic Surgery Clinic    Your surgeon was Dr Bryce Nowak recommends:    1. Dental clearance visit will be schedule by Dr. Nowak  2. Schedule surgery in June,  will call patient  3. Schedule pre operative tests at [facility]  4. Schedule follow up with cardiologist (Johnathon) within next month      Please feel free to call me with any questions or concerns.    Bartolome Azul, RN Care Coordinator  Cardiothoracic Surgery Division  [facility]  [phone]

## 2023-04-03 NOTE — Clinical Note
4/3/2023      RE: Silvia Otero  [address] [facility] [address]       Dear Colleague,    Thank you for the opportunity to participate in the care of your patient, Silvia Otero, at the [facility]. Please see a copy of my visit note below.    No notes on file    Please do not hesitate to contact me if you have any questions/concerns.     Sincerely,     Bryce Nowak MD

## 2023-04-03 NOTE — NURSING NOTE
Chief Complaint   Patient presents with     New Patient     New CV Surgery     Vitals were taken and medications reconciled.    Theo Frederick, EMT  3:46 PM

## 2023-04-04 ENCOUNTER — TELEPHONE (OUTPATIENT)
Dept: CARDIOLOGY | Facility: CLINIC | Age: 28
End: 2023-04-04
Payer: COMMERCIAL

## 2023-04-04 NOTE — TELEPHONE ENCOUNTER
Per task, pt needs to schedule surgery with /Dr. Kilpatrick. LM asking pt to call back. Will try again

## 2023-04-04 NOTE — NURSING NOTE
Patient seen today for consultation for Redo sternotomy, redo tricuspid valve replacement.    Surgery procedure explained to patient and all questions and concerns were answered and addressed.     Valve choices were discussed with the patient, mechanical and bioprosthetic.     Reviewed pre surgery tests and procedures needed and will call patient to schedule.      Pre surgery preparation folder with instructions for surgery preparation and recovery will be mailed to the patient.     Patient states that she will need a letter written for her mother to be able to come visit from [place]. Letter will be done once we have surgery scheduled by Bartolome ESPINOZA.    Dental clearance letter given to patient during visit, informed to schedule a dentist visit as soon as possible as to avoid delay of surgery.     Patient verbalized understanding of all instructions and will call with any questions or concerns.       ALEXIS Pandey  Cardiothoracic Surgery   [facility]  O) [phone]  F) [phone]

## 2023-04-06 ENCOUNTER — PREP FOR PROCEDURE (OUTPATIENT)
Dept: CARDIOLOGY | Facility: CLINIC | Age: 28
End: 2023-04-06
Payer: COMMERCIAL

## 2023-04-06 DIAGNOSIS — I07.1 TRICUSPID REGURGITATION: Primary | ICD-10-CM

## 2023-04-12 ENCOUNTER — ONCOLOGY VISIT (OUTPATIENT)
Dept: ONCOLOGY | Facility: CLINIC | Age: 28
End: 2023-04-12
Attending: INTERNAL MEDICINE
Payer: COMMERCIAL

## 2023-04-12 VITALS
DIASTOLIC BLOOD PRESSURE: 81 MMHG | HEART RATE: 70 BPM | RESPIRATION RATE: 20 BRPM | TEMPERATURE: 98.3 F | SYSTOLIC BLOOD PRESSURE: 118 MMHG | OXYGEN SATURATION: 100 % | BODY MASS INDEX: 25.1 KG/M2 | WEIGHT: 170 LBS

## 2023-04-12 DIAGNOSIS — D50.8 OTHER IRON DEFICIENCY ANEMIA: ICD-10-CM

## 2023-04-12 LAB
BASOPHILS # BLD AUTO: 0 10E3/UL (ref 0–0.2)
BASOPHILS NFR BLD AUTO: 1 %
EOSINOPHIL # BLD AUTO: 0.1 10E3/UL (ref 0–0.7)
EOSINOPHIL NFR BLD AUTO: 2 %
ERYTHROCYTE [DISTWIDTH] IN BLOOD BY AUTOMATED COUNT: 17.9 % (ref 10–15)
FERRITIN SERPL-MCNC: 62 NG/ML (ref 6–175)
HCT VFR BLD AUTO: 37.9 % (ref 35–47)
HGB BLD-MCNC: 11.2 G/DL (ref 11.7–15.7)
IMM GRANULOCYTES # BLD: 0 10E3/UL
IMM GRANULOCYTES NFR BLD: 0 %
IRON BINDING CAPACITY (ROCHE): 361 UG/DL (ref 240–430)
IRON SATN MFR SERPL: 12 % (ref 15–46)
IRON SERPL-MCNC: 43 UG/DL (ref 37–145)
LYMPHOCYTES # BLD AUTO: 1.1 10E3/UL (ref 0.8–5.3)
LYMPHOCYTES NFR BLD AUTO: 35 %
MCH RBC QN AUTO: 25.3 PG (ref 26.5–33)
MCHC RBC AUTO-ENTMCNC: 29.6 G/DL (ref 31.5–36.5)
MCV RBC AUTO: 86 FL (ref 78–100)
MONOCYTES # BLD AUTO: 0.2 10E3/UL (ref 0–1.3)
MONOCYTES NFR BLD AUTO: 7 %
NEUTROPHILS # BLD AUTO: 1.8 10E3/UL (ref 1.6–8.3)
NEUTROPHILS NFR BLD AUTO: 55 %
NRBC # BLD AUTO: 0 10E3/UL
NRBC BLD AUTO-RTO: 0 /100
PLATELET # BLD AUTO: 149 10E3/UL (ref 150–450)
RBC # BLD AUTO: 4.43 10E6/UL (ref 3.8–5.2)
WBC # BLD AUTO: 3.3 10E3/UL (ref 4–11)

## 2023-04-12 PROCEDURE — 85025 COMPLETE CBC W/AUTO DIFF WBC: CPT | Performed by: INTERNAL MEDICINE

## 2023-04-12 PROCEDURE — G0463 HOSPITAL OUTPT CLINIC VISIT: HCPCS | Performed by: PHYSICIAN ASSISTANT

## 2023-04-12 PROCEDURE — 82728 ASSAY OF FERRITIN: CPT | Performed by: INTERNAL MEDICINE

## 2023-04-12 PROCEDURE — 36415 COLL VENOUS BLD VENIPUNCTURE: CPT | Performed by: PHYSICIAN ASSISTANT

## 2023-04-12 PROCEDURE — 83550 IRON BINDING TEST: CPT | Performed by: INTERNAL MEDICINE

## 2023-04-12 PROCEDURE — 99214 OFFICE O/P EST MOD 30 MIN: CPT | Performed by: PHYSICIAN ASSISTANT

## 2023-04-12 RX ORDER — FERROUS SULFATE 325(65) MG
325 TABLET ORAL
Status: ON HOLD | COMMUNITY
Start: 2022-03-10 | End: 2023-07-26

## 2023-04-12 ASSESSMENT — PAIN SCALES - GENERAL: PAINLEVEL: NO PAIN (0)

## 2023-04-12 NOTE — PROGRESS NOTES
Oncology/Hematology Visit Note  Apr 12, 2023    Reason for Visit: Follow up of iron deficiency anemia    Primary Hematologist: Dr. Lovett    Hematologic History:  History of iron deficiency anemia likely related to menorrhagia. Received Venofer 300 mg x 2 2/2023. Additionally, tricuspid valve stenosis with mechanical valve on Warfarin.     Interval History:  Doing well. No symptoms of anemia such as pica, restless legs, hair thinning, pallor, oral ulcers. Denies fatigue or dyspnea. Continues to have metromenorrhagia, last cycle heavy and lasted 2 weeks. No other bleeding. Describes poor tolerability of IV iron during hospital stay. Tolerating daily oral iron well. She does get tarry stools but improves with hydration.    Review of Systems:  A complete review of systems was negative except as noted in the HPI.     Past medical, Surgical, and social history:  Reviewed    Physical Examination:  /81 (BP Location: Left arm, Patient Position: Sitting, Cuff Size: Adult Regular)   Pulse 70   Temp 98.3  F (36.8  C) (Oral)   Resp 20   Wt 77.1 kg (170 lb)   LMP 02/23/2023 (Within Days)   SpO2 100%   BMI 25.10 kg/m    Wt Readings from Last 10 Encounters:   04/12/23 77.1 kg (170 lb)   04/03/23 76.8 kg (169 lb 6.4 oz)   03/13/23 77.1 kg (170 lb)   03/10/23 77.2 kg (170 lb 1.6 oz)   03/02/23 75.1 kg (165 lb 8 oz)     General: Pleasant patient in no acute distress.  Skin: Warm and dry. No pallor. No abnormal rashes.   Eyes: Anicteric.   ENT: Oral mucosa pink and moist without erythema, lesions, thrush, or petechia.  Heart: Faint diastolic murmur.  Lungs: Clear to auscultation in all fields with no adventitious lung sounds. Normal work of breathing.  Abdomen: Soft, non-tender, non-distended without hepatosplenomegaly. Normoactive bowel sounds.  Extremities: No peripheral edema. Gross movement intact without difficulty.  Mental: Calm, cooperative, appropriate. Mood euthymic.  Neuro: Cranial nerves and coordination grossly  intact. Alert and oriented x 3.    Laboratory Data:  CBC, iron studies reviewed.   CBC RESULTS: Recent Labs   Lab Test 04/12/23  0849   WBC 3.3*   RBC 4.43   HGB 11.2*   HCT 37.9   MCV 86   MCH 25.3*   MCHC 29.6*   RDW 17.9*   *     Iron   Date Value Ref Range Status   04/12/2023 43 37 - 145 ug/dL Final     Iron Binding Capacity   Date Value Ref Range Status   04/12/2023 361 240 - 430 ug/dL Final       Assessment and Plan:  Silvia Otero is a 27 year old female with tricuspid stenosis on Warfarin for mechanical valve and iron deficiency anemia likely secondary to menorrhagia.     1. Iron Deficiency Anemia  Recently hospitalized 2/2023 with valve issues. Hematology consulted for anemia and found to have iron deficiency. No hyperbilirubinemia, therefore lysis unlikely. She received IV iron inpatient but developed flu-like symptoms following. On discharge she has been taking oral iron 1 tab daily. She notes sticky stools but improves with hydration. Takes daily rather than every other day for easier to remember. She is resistant to repeating IV iron and would prefer to continue oral as is. Repeat labs today show hgb increasing to 11.2 from as low as 7 earlier this year. Serum iron up to 43 from 22. It is difficult to say if the increase is from the IV repletion or oral. Fortunately she is asymptomatic from anemia standpoint. She will have labs rechecked prior to valve replacement in June. We will recheck iron studies and CBC two months later in August 2023, 4 months from now. If iron stores decreasing, she would benefit from IV iron at that time. She is aware to contact us in the interim with health changes. No current indication for GI work-up as likely caused by menorrhagia, worsened by therapeutic anticoagulation.    2. Menorrhagia  She has ongoing menorrhagia likely contributing to blood loss. For example, last period 2 weeks and heavy. She is not interested in establishing care with gynecology to control  blood loss through menses.     3. Tricuspid Stenosis  4. Mechanical Valve  She is scheduled for valve replacement 6/2023. We are working to optimize iron and hemoglobin prior as above.     30 minutes spent on the date of the encounter doing chart review, review of test results, interpretation of tests, patient visit and documentation     Gema Dick PA-C  [facility]   [phone]    Chart documentation with Dragon Voice recognition Software. Although reviewed after completion, some words and grammatical errors may remain.

## 2023-04-12 NOTE — PROGRESS NOTES
"Oncology Rooming Note    April 12, 2023 8:45 AM   Silvia Otero is a 27 year old female who presents for:    Chief Complaint   Patient presents with     Oncology Clinic Visit     Iron deficiency anemia due to chronic blood loss       Initial Vitals: /81 (BP Location: Left arm, Patient Position: Sitting, Cuff Size: Adult Regular)   Pulse 70   Temp 98.3  F (36.8  C) (Oral)   Resp 20   Wt 77.1 kg (170 lb)   LMP 02/23/2023 (Within Days)   SpO2 100%   BMI 25.10 kg/m   Estimated body mass index is 25.1 kg/m  as calculated from the following:    Height as of 3/13/23: 1.753 m (5' 9\").    Weight as of this encounter: 77.1 kg (170 lb). Body surface area is 1.94 meters squared.  No Pain (0) Comment: Data Unavailable   Patient's last menstrual period was 02/23/2023 (within days).  Allergies reviewed: Yes  Medications reviewed: Yes    Medications: Medication refills not needed today.  Pharmacy name entered into [facility]: [facility] [place], [place] - [address]    Clinical concerns: no        Clara De Los Santos CMA              "

## 2023-04-12 NOTE — LETTER
"    4/12/2023         RE: Silvia Otero  [id number] [facility] [address]        Dear Colleague,    Thank you for referring your patient, Silvia Otero, to the [facility]. Please see a copy of my visit note below.    Oncology Rooming Note    April 12, 2023 8:45 AM   Silvia Otero is a 27 year old female who presents for:    Chief Complaint   Patient presents with     Oncology Clinic Visit     Iron deficiency anemia due to chronic blood loss       Initial Vitals: /81 (BP Location: Left arm, Patient Position: Sitting, Cuff Size: Adult Regular)   Pulse 70   Temp 98.3  F (36.8  C) (Oral)   Resp 20   Wt 77.1 kg (170 lb)   LMP 02/23/2023 (Within Days)   SpO2 100%   BMI 25.10 kg/m   Estimated body mass index is 25.1 kg/m  as calculated from the following:    Height as of 3/13/23: 1.753 m (5' 9\").    Weight as of this encounter: 77.1 kg (170 lb). Body surface area is 1.94 meters squared.  No Pain (0) Comment: Data Unavailable   Patient's last menstrual period was 02/23/2023 (within days).  Allergies reviewed: Yes  Medications reviewed: Yes    Medications: Medication refills not needed today.  Pharmacy name entered into [facility]: [facility] [address] [place] [address] - [address] [place] [address]    Clinical concerns: no        Clara De Los Santos, [facility]                Oncology/Hematology Visit Note  Apr 12, 2023    Reason for Visit: Follow up of iron deficiency anemia    Primary Hematologist: Dr. Lovett    Hematologic History:  History of iron deficiency anemia likely related to menorrhagia. Received Venofer 300 mg x 2 2/2023. Additionally, tricuspid valve stenosis with mechanical valve on Warfarin.     Interval History:  Doing well. No symptoms of anemia such as pica, restless legs, hair thinning, pallor, oral ulcers. Denies fatigue or dyspnea. Continues to have metromenorrhagia, last cycle heavy and lasted 2 weeks. No other bleeding. Describes poor tolerability of IV iron " during hospital stay. Tolerating daily oral iron well. She does get tarry stools but improves with hydration.    Review of Systems:  A complete review of systems was negative except as noted in the HPI.     Past medical, Surgical, and social history:  Reviewed    Physical Examination:  /81 (BP Location: Left arm, Patient Position: Sitting, Cuff Size: Adult Regular)   Pulse 70   Temp 98.3  F (36.8  C) (Oral)   Resp 20   Wt 77.1 kg (170 lb)   LMP 02/23/2023 (Within Days)   SpO2 100%   BMI 25.10 kg/m    Wt Readings from Last 10 Encounters:   04/12/23 77.1 kg (170 lb)   04/03/23 76.8 kg (169 lb 6.4 oz)   03/13/23 77.1 kg (170 lb)   03/10/23 77.2 kg (170 lb 1.6 oz)   03/02/23 75.1 kg (165 lb 8 oz)     General: Pleasant patient in no acute distress.  Skin: Warm and dry. No pallor. No abnormal rashes.   Eyes: Anicteric.   ENT: Oral mucosa pink and moist without erythema, lesions, thrush, or petechia.  Heart: Faint diastolic murmur.  Lungs: Clear to auscultation in all fields with no adventitious lung sounds. Normal work of breathing.  Abdomen: Soft, non-tender, non-distended without hepatosplenomegaly. Normoactive bowel sounds.  Extremities: No peripheral edema. Gross movement intact without difficulty.  Mental: Calm, cooperative, appropriate. Mood euthymic.  Neuro: Cranial nerves and coordination grossly intact. Alert and oriented x 3.    Laboratory Data:  CBC, iron studies reviewed.   CBC RESULTS: Recent Labs   Lab Test 04/12/23  0849   WBC 3.3*   RBC 4.43   HGB 11.2*   HCT 37.9   MCV 86   MCH 25.3*   MCHC 29.6*   RDW 17.9*   *     Iron   Date Value Ref Range Status   04/12/2023 43 37 - 145 ug/dL Final     Iron Binding Capacity   Date Value Ref Range Status   04/12/2023 361 240 - 430 ug/dL Final       Assessment and Plan:  Silvia Otero is a 27 year old female with tricuspid stenosis on Warfarin for mechanical valve and iron deficiency anemia likely secondary to menorrhagia.     1. Iron Deficiency  Anemia  Recently hospitalized 2/2023 with valve issues. Hematology consulted for anemia and found to have iron deficiency. No hyperbilirubinemia, therefore lysis unlikely. She received IV iron inpatient but developed flu-like symptoms following. On discharge she has been taking oral iron 1 tab daily. She notes sticky stools but improves with hydration. Takes daily rather than every other day for easier to remember. She is resistant to repeating IV iron and would prefer to continue oral as is. Repeat labs today show hgb increasing to 11.2 from as low as 7 earlier this year. Serum iron up to 43 from 22. It is difficult to say if the increase is from the IV repletion or oral. Fortunately she is asymptomatic from anemia standpoint. She will have labs rechecked prior to valve replacement in June. We will recheck iron studies and CBC two months later in August 2023, 4 months from now. If iron stores decreasing, she would benefit from IV iron at that time. She is aware to contact us in the interim with health changes. No current indication for GI work-up as likely caused by menorrhagia, worsened by therapeutic anticoagulation.    2. Menorrhagia  She has ongoing menorrhagia likely contributing to blood loss. For example, last period 2 weeks and heavy. She is not interested in establishing care with gynecology to control blood loss through menses.     3. Tricuspid Stenosis  4. Mechanical Valve  She is scheduled for valve replacement 6/2023. We are working to optimize iron and hemoglobin prior as above.     30 minutes spent on the date of the encounter doing chart review, review of test results, interpretation of tests, patient visit and documentation     Gema Dick PA-C  [facility]   [phone]    Chart documentation with Dragon Voice recognition Software. Although reviewed after completion, some words and grammatical errors may remain.        Again, thank you for allowing me to participate  in the care of your patient.        Sincerely,        ANSELMO BEAR PA-C

## 2023-04-13 ENCOUNTER — TELEPHONE (OUTPATIENT)
Dept: ANTICOAGULATION | Facility: CLINIC | Age: 28
End: 2023-04-13
Payer: COMMERCIAL

## 2023-04-13 NOTE — TELEPHONE ENCOUNTER
ANTICOAGULATION     Silvia Otero is overdue for INR check.  INR 3.3 on 4/4/23 done at [facility].      Left message for patient to call and schedule lab appointment as soon as possible. If returning call, please schedule.     Francesca Estrada RN

## 2023-04-18 ENCOUNTER — TELEPHONE (OUTPATIENT)
Dept: ONCOLOGY | Facility: CLINIC | Age: 28
End: 2023-04-18
Payer: COMMERCIAL

## 2023-04-18 NOTE — TELEPHONE ENCOUNTER
Called Silvia regarding recommendation on Iron levels from ERNESTO Ribeiro.       Elmo,   Saw this Lassi patient today for iron def. Anemia. She didn't present for previously scheduled lab draw so we obtained labs this morning. I tried to call her with results but no answer and no VM. Can an RNCC please update her with hemoglobin and iron results? Her hemoglobin is uptrending nicely and serum levels now normal. Hard to say if this improvement is from the prior IV iron only or also being supported by the ongoing PO iron. We will recheck in 4 months as we discussed in clinic today.   Thank you,     Left message with patient  And instructed her to call clinic with any questions. Silvia Roe RN,BSN,OCN,CBCN

## 2023-04-24 ENCOUNTER — TELEPHONE (OUTPATIENT)
Dept: ANTICOAGULATION | Facility: CLINIC | Age: 28
End: 2023-04-24
Payer: COMMERCIAL

## 2023-04-24 NOTE — TELEPHONE ENCOUNTER
ANTICOAGULATION     Silvia Otero is overdue for INR check.      Spoke with Silvia and scheduled lab appointment on 4/25/23    Rebecca Barrientos RN

## 2023-04-25 ENCOUNTER — LAB (OUTPATIENT)
Dept: LAB | Facility: CLINIC | Age: 28
End: 2023-04-25
Payer: COMMERCIAL

## 2023-04-25 ENCOUNTER — ANTICOAGULATION THERAPY VISIT (OUTPATIENT)
Dept: ANTICOAGULATION | Facility: CLINIC | Age: 28
End: 2023-04-25

## 2023-04-25 DIAGNOSIS — I48.92 ATRIAL FLUTTER, UNSPECIFIED TYPE (H): ICD-10-CM

## 2023-04-25 DIAGNOSIS — I51.3 MURAL THROMBUS OF HEART: ICD-10-CM

## 2023-04-25 DIAGNOSIS — I51.3 MURAL THROMBUS OF HEART: Primary | ICD-10-CM

## 2023-04-25 DIAGNOSIS — Z95.2 HISTORY OF TRICUSPID VALVE REPLACEMENT WITH MECHANICAL VALVE: ICD-10-CM

## 2023-04-25 LAB — INR BLD: 4.3 (ref 0.9–1.1)

## 2023-04-25 PROCEDURE — 85610 PROTHROMBIN TIME: CPT

## 2023-04-25 PROCEDURE — 36415 COLL VENOUS BLD VENIPUNCTURE: CPT

## 2023-04-25 NOTE — PROGRESS NOTES
ANTICOAGULATION MANAGEMENT     Silvia Otero 27 year old female is on warfarin with supratherapeutic INR result. (Goal INR Other - see comment)    Recent labs: (last 7 days)     04/25/23  1141   INR 4.3*       ASSESSMENT       Source(s): Chart Review and Patient/Caregiver Call       Warfarin doses taken: Warfarin taken as instructed    Diet: No new diet changes identified    Medication/supplement changes: None noted    New illness, injury, or hospitalization: No    Signs or symptoms of bleeding or clotting: No    Previous result: Supratherapeutic    Additional findings: None         PLAN     Recommended plan for no diet, medication or health factor changes affecting INR     Dosing Instructions: decrease your warfarin dose (5.6% change) with next INR in 2 weeks       Summary  As of 4/25/2023    Full warfarin instructions:  15 mg every Mon, Thu, Sat; 10 mg all other days   Next INR check:  5/10/2023             Telephone call with Silvia who verbalizes understanding and agrees to plan    Lab visit scheduled    Education provided:     Goal range and lab monitoring: goal range and significance of current result    Contact [phone] with any changes, questions or concerns.     Plan made with [facility] Pharmacist Zelda Estrada, RN  Anticoagulation Clinic  4/25/2023    _______________________________________________________________________     Anticoagulation Episode Summary     Current INR goal:  Other - see comment   TTR:  0.0 % (1.5 mo)   Target end date:  Indefinite   Send INR reminders to:  [facility] HEART INR NURSE    Indications    Mural thrombus of heart [I51.3]  History of tricuspid valve replacement with mechanical valve [Z95.2]  Atrial flutter  unspecified type (H) [I48.92]           Comments:  INR goal 3.5-4         Anticoagulation Care Providers     Provider Role Specialty Phone number    Eduardo Araiza NP Referring Cardiovascular Disease [phone]    Renee Hunyh NP Referring   [phone]

## 2023-05-15 ENCOUNTER — MEDICAL CORRESPONDENCE (OUTPATIENT)
Dept: HEALTH INFORMATION MANAGEMENT | Facility: CLINIC | Age: 28
End: 2023-05-15

## 2023-05-16 ENCOUNTER — TELEPHONE (OUTPATIENT)
Dept: ANTICOAGULATION | Facility: CLINIC | Age: 28
End: 2023-05-16
Payer: COMMERCIAL

## 2023-05-16 NOTE — TELEPHONE ENCOUNTER
ANTICOAGULATION     Silvia Otero is overdue for INR check.      Left message for patient to call and schedule lab appointment as soon as possible. If returning call, please schedule.     Rebecca Barrientos RN

## 2023-05-18 NOTE — TELEPHONE ENCOUNTER
FUTURE VISIT INFORMATION      SURGERY INFORMATION:    Date: 23    Location: uu or    Surgeon:  Bryce Nowak, Remi Porter MD    Anesthesia Type:  general    Procedure: STERNOTOMY, REPEAT, WITH TRICUSPID VALVE REPAIR POSSIBLE REPLACEMENT AND ANY associated procedures    RECORDS REQUESTED FROM:       Pertinent Medical History: mural thrombus of the heart, atrial flutter    Most recent EKG+ Tracin/3/23    Most recent ECHO: 3/20/23

## 2023-05-24 ENCOUNTER — TELEPHONE (OUTPATIENT)
Dept: ANTICOAGULATION | Facility: CLINIC | Age: 28
End: 2023-05-24

## 2023-05-24 ENCOUNTER — TELEPHONE (OUTPATIENT)
Dept: CARDIOLOGY | Facility: CLINIC | Age: 28
End: 2023-05-24

## 2023-05-24 DIAGNOSIS — I07.1 TRICUSPID REGURGITATION: Primary | ICD-10-CM

## 2023-05-24 NOTE — TELEPHONE ENCOUNTER
LVM for pt letting her know an ECHO was added on to her 6/6 appointment schedule. Left name and call back number asking her to confirm. Will call pt again next day

## 2023-05-26 ENCOUNTER — TELEPHONE (OUTPATIENT)
Dept: CARDIOLOGY | Facility: CLINIC | Age: 28
End: 2023-05-26
Payer: COMMERCIAL

## 2023-05-26 ENCOUNTER — TELEPHONE (OUTPATIENT)
Dept: ANTICOAGULATION | Facility: CLINIC | Age: 28
End: 2023-05-26

## 2023-05-26 DIAGNOSIS — I51.3 MURAL THROMBUS OF HEART: Primary | ICD-10-CM

## 2023-05-26 DIAGNOSIS — I48.92 ATRIAL FLUTTER, UNSPECIFIED TYPE (H): ICD-10-CM

## 2023-05-26 DIAGNOSIS — Z95.2 HISTORY OF TRICUSPID VALVE REPLACEMENT WITH MECHANICAL VALVE: ICD-10-CM

## 2023-05-26 NOTE — TELEPHONE ENCOUNTER
Routing to [facility] for hold and bridge plan orders.      This patient is scheduled for open heart surgery on 6/30 with Dr. Bryce Nowak. She will need to stop coumadin for 5 days and be bridged with Lovenox. I was hoping you could assist with the coordination of the Lovenox bridge plan and orders. Please let me know if you have questions or if I can be of further assistance.     Thank you,     Bartolome Azul, RNCC   Cardiothoracic Surgery   [facility]   O) [phone]   F) [phone]

## 2023-06-05 LAB
ABO/RH(D): NORMAL
ANTIBODY SCREEN: NEGATIVE
SPECIMEN EXPIRATION DATE: NORMAL

## 2023-06-05 NOTE — PROGRESS NOTES
Preoperative Assessment Center Medication History Note  Medication history completed on June 5, 2023 by this writer prior to patient's PAC appointment. See Epic admission navigator for prior to admission medications. Operating room staff will still need to confirm medications and last dose information on day of surgery.     Medication history interview sources  Patient and CareEverywhere/SureScripts via phone    Pertinent Information: patient reports out of ferrous sulfate and almost out of aspirin. Stressed importance of these medications, specifically the blood thinners and she will call to get a refill from her pharmacy or purchase OTC.      Changes made to PTA medication list    Added: none    Deleted: none    Changed: warfarin dose/sig, ferrous sulfate.     Allergies reviewed with patient and updates made in EHR: yes    -- No recent (within 30 days) course of antibiotics  -- No recent (within 30 days) course of systemic steroids  -- Reports being on blood thinning medications - warfarin, aspirin.    -- Declines being on any other prescription or over-the-counter medications    Prior to Admission medications    Medication Sig Last Dose Taking? Auth Provider Long Term End Date   aspirin (ASA) 81 MG EC tablet Take 1 tablet (81 mg) by mouth daily Taking Yes Laure Humphrey MD     warfarin ANTICOAGULANT (COUMADIN) 10 MG tablet take 15 mg every Mon, Thu, Sat; 10 mg all other days of the week. Takes in the evening. Taking Yes Unknown, Entered By History     ferrous sulfate (FEROSUL) 325 (65 Fe) MG tablet Take 325 mg by mouth  Patient not taking: Reported on 6/5/2023 Not Taking  Reported, Patient            Medication History Completed By: John Arrieta RPH 6/5/2023 2:42 PM

## 2023-06-05 NOTE — PHARMACY - PREOPERATIVE ASSESSMENT CENTER
Anticoagulation Note - Preoperative Assessment Center (PAC) Pharmacist     Patient was interviewed on June 5, 2023 prior to scheduled PAC clinic appointment. The purpose of this note is to document the perioperative anticoagulation plan outlined by the providers caring for Silvia Otero.     Current Regimen  Anticoagulation Regimen as of June 5, 2023: warfarin - take 15 mg every Mon, Thu, Sat; 10 mg all other days of the week. Takes in the evening.   Indication: Mechanical tricuspid valve w/ stenosis (concern for thrombus?) aflutter   Prescriber:  Dr. Gonzales (managed by [facility] anticoagulation clinic)  Expected Duration of therapy: indefinite  Current medications that may interact with this include: aspirin  INR Goal: 3.5-4 (per CV surgery)    Creatinine   Date Value Ref Range Status   03/10/2023 0.65 0.51 - 0.95 mg/dL Final   Note: per notes patient is due for INR will ask her to get this at PAC visit on 6/6/23.     Perioperative plan  Silvia Otero is scheduled for STERNOTOMY, REPEAT, WITH TRICUSPID VALVE REPAIR POSSIBLE REPLACEMENT AND ANY associated procedures on 6/30/23 with Dr. Nowak and the perioperative anticoagulation plan will be outlined by CVTS team in coordination with patient's [place] anticoagulation clinic (see notes from  is 5/26/23).  Please see future notes from AC clinic for final plan for hold/bridge pre op.   Last dose of aspirin on 6/29/23    Resumption of anticoagulation after procedure will be based on surgery team assessment of bleeding risks and complications.  This plan may require re-assessment and modification by her primary team in the perioperative setting depending on patients clinical situation.        John Arrieta RPH  June 5, 2023  2:42 PM

## 2023-06-06 ENCOUNTER — TELEPHONE (OUTPATIENT)
Dept: CARDIOLOGY | Facility: CLINIC | Age: 28
End: 2023-06-06

## 2023-06-06 ENCOUNTER — OFFICE VISIT (OUTPATIENT)
Dept: SURGERY | Facility: CLINIC | Age: 28
End: 2023-06-06
Attending: SURGERY
Payer: COMMERCIAL

## 2023-06-06 ENCOUNTER — PRE VISIT (OUTPATIENT)
Dept: SURGERY | Facility: CLINIC | Age: 28
End: 2023-06-06

## 2023-06-06 ENCOUNTER — ANCILLARY PROCEDURE (OUTPATIENT)
Dept: CT IMAGING | Facility: CLINIC | Age: 28
End: 2023-06-06
Attending: SURGERY
Payer: COMMERCIAL

## 2023-06-06 ENCOUNTER — LAB (OUTPATIENT)
Dept: LAB | Facility: CLINIC | Age: 28
End: 2023-06-06
Payer: COMMERCIAL

## 2023-06-06 ENCOUNTER — ANCILLARY PROCEDURE (OUTPATIENT)
Dept: GENERAL RADIOLOGY | Facility: CLINIC | Age: 28
End: 2023-06-06
Attending: SURGERY
Payer: COMMERCIAL

## 2023-06-06 ENCOUNTER — ANESTHESIA EVENT (OUTPATIENT)
Dept: SURGERY | Facility: CLINIC | Age: 28
DRG: 219 | End: 2023-06-06
Payer: COMMERCIAL

## 2023-06-06 ENCOUNTER — ANTICOAGULATION THERAPY VISIT (OUTPATIENT)
Dept: ANTICOAGULATION | Facility: CLINIC | Age: 28
End: 2023-06-06

## 2023-06-06 VITALS
SYSTOLIC BLOOD PRESSURE: 120 MMHG | TEMPERATURE: 97.7 F | OXYGEN SATURATION: 100 % | WEIGHT: 176.2 LBS | HEIGHT: 69 IN | DIASTOLIC BLOOD PRESSURE: 82 MMHG | BODY MASS INDEX: 26.1 KG/M2 | HEART RATE: 72 BPM | RESPIRATION RATE: 16 BRPM

## 2023-06-06 DIAGNOSIS — I07.9 TRICUSPID VALVE DISEASE: ICD-10-CM

## 2023-06-06 DIAGNOSIS — I51.3 MURAL THROMBUS OF HEART: Primary | ICD-10-CM

## 2023-06-06 DIAGNOSIS — I48.92 ATRIAL FLUTTER, UNSPECIFIED TYPE (H): ICD-10-CM

## 2023-06-06 DIAGNOSIS — Z95.2 HISTORY OF TRICUSPID VALVE REPLACEMENT WITH MECHANICAL VALVE: ICD-10-CM

## 2023-06-06 DIAGNOSIS — Z01.818 PRE-OP EVALUATION: Primary | ICD-10-CM

## 2023-06-06 LAB
ALBUMIN SERPL BCG-MCNC: 5 G/DL (ref 3.5–5.2)
ALBUMIN UR-MCNC: NEGATIVE MG/DL
ALP SERPL-CCNC: 83 U/L (ref 35–104)
ALT SERPL W P-5'-P-CCNC: 16 U/L (ref 10–35)
ANION GAP SERPL CALCULATED.3IONS-SCNC: 10 MMOL/L (ref 7–15)
APPEARANCE UR: CLEAR
APTT PPP: 36 SECONDS (ref 22–38)
AST SERPL W P-5'-P-CCNC: 23 U/L (ref 10–35)
BILIRUB SERPL-MCNC: 0.9 MG/DL
BILIRUB UR QL STRIP: NEGATIVE
BUN SERPL-MCNC: 11.4 MG/DL (ref 6–20)
CALCIUM SERPL-MCNC: 9.6 MG/DL (ref 8.6–10)
CHLORIDE SERPL-SCNC: 104 MMOL/L (ref 98–107)
COLOR UR AUTO: NORMAL
CREAT BLD-MCNC: 0.6 MG/DL (ref 0.5–1)
CREAT SERPL-MCNC: 0.61 MG/DL (ref 0.51–0.95)
DEPRECATED HCO3 PLAS-SCNC: 23 MMOL/L (ref 22–29)
ERYTHROCYTE [DISTWIDTH] IN BLOOD BY AUTOMATED COUNT: 16.3 % (ref 10–15)
GFR SERPL CREATININE-BSD FRML MDRD: >60 ML/MIN/1.73M2
GFR SERPL CREATININE-BSD FRML MDRD: >90 ML/MIN/1.73M2
GLUCOSE SERPL-MCNC: 80 MG/DL (ref 70–99)
GLUCOSE UR STRIP-MCNC: NEGATIVE MG/DL
HBA1C MFR BLD: 5.2 %
HCT VFR BLD AUTO: 27.2 % (ref 35–47)
HGB BLD-MCNC: 8 G/DL (ref 11.7–15.7)
HGB UR QL STRIP: NEGATIVE
INR PPP: 1.95 (ref 0.85–1.15)
KETONES UR STRIP-MCNC: NEGATIVE MG/DL
LEUKOCYTE ESTERASE UR QL STRIP: NEGATIVE
MCH RBC QN AUTO: 23.1 PG (ref 26.5–33)
MCHC RBC AUTO-ENTMCNC: 29.4 G/DL (ref 31.5–36.5)
MCV RBC AUTO: 79 FL (ref 78–100)
NITRATE UR QL: NEGATIVE
PH UR STRIP: 6 [PH] (ref 5–7)
PLATELET # BLD AUTO: 145 10E3/UL (ref 150–450)
POTASSIUM SERPL-SCNC: 3.9 MMOL/L (ref 3.4–5.3)
PROT SERPL-MCNC: 8 G/DL (ref 6.4–8.3)
RBC # BLD AUTO: 3.46 10E6/UL (ref 3.8–5.2)
RBC URINE: 0 /HPF
SODIUM SERPL-SCNC: 137 MMOL/L (ref 136–145)
SP GR UR STRIP: 1.01 (ref 1–1.03)
SQUAMOUS EPITHELIAL: 1 /HPF
UROBILINOGEN UR STRIP-MCNC: NORMAL MG/DL
WBC # BLD AUTO: 2.3 10E3/UL (ref 4–11)
WBC URINE: <1 /HPF

## 2023-06-06 PROCEDURE — 85730 THROMBOPLASTIN TIME PARTIAL: CPT | Performed by: PATHOLOGY

## 2023-06-06 PROCEDURE — 71046 X-RAY EXAM CHEST 2 VIEWS: CPT | Mod: GC | Performed by: RADIOLOGY

## 2023-06-06 PROCEDURE — 93000 ELECTROCARDIOGRAM COMPLETE: CPT | Performed by: INTERNAL MEDICINE

## 2023-06-06 PROCEDURE — 99203 OFFICE O/P NEW LOW 30 MIN: CPT | Performed by: PHYSICIAN ASSISTANT

## 2023-06-06 PROCEDURE — 85610 PROTHROMBIN TIME: CPT | Performed by: PATHOLOGY

## 2023-06-06 PROCEDURE — 86850 RBC ANTIBODY SCREEN: CPT | Performed by: SURGERY

## 2023-06-06 PROCEDURE — 71275 CT ANGIOGRAPHY CHEST: CPT | Mod: GC | Performed by: RADIOLOGY

## 2023-06-06 PROCEDURE — 83036 HEMOGLOBIN GLYCOSYLATED A1C: CPT | Performed by: SURGERY

## 2023-06-06 PROCEDURE — 36415 COLL VENOUS BLD VENIPUNCTURE: CPT | Performed by: PATHOLOGY

## 2023-06-06 PROCEDURE — 84134 ASSAY OF PREALBUMIN: CPT | Performed by: SURGERY

## 2023-06-06 PROCEDURE — 86901 BLOOD TYPING SEROLOGIC RH(D): CPT | Performed by: SURGERY

## 2023-06-06 PROCEDURE — 85027 COMPLETE CBC AUTOMATED: CPT | Performed by: PATHOLOGY

## 2023-06-06 PROCEDURE — 82565 ASSAY OF CREATININE: CPT | Performed by: PATHOLOGY

## 2023-06-06 PROCEDURE — 80053 COMPREHEN METABOLIC PANEL: CPT | Performed by: PATHOLOGY

## 2023-06-06 PROCEDURE — 81001 URINALYSIS AUTO W/SCOPE: CPT | Performed by: PATHOLOGY

## 2023-06-06 RX ORDER — IOPAMIDOL 755 MG/ML
90 INJECTION, SOLUTION INTRAVASCULAR ONCE
Status: COMPLETED | OUTPATIENT
Start: 2023-06-06 | End: 2023-06-06

## 2023-06-06 RX ADMIN — IOPAMIDOL 90 ML: 755 INJECTION, SOLUTION INTRAVASCULAR at 12:54

## 2023-06-06 ASSESSMENT — ENCOUNTER SYMPTOMS: SEIZURES: 0

## 2023-06-06 ASSESSMENT — LIFESTYLE VARIABLES: TOBACCO_USE: 0

## 2023-06-06 ASSESSMENT — PAIN SCALES - GENERAL: PAINLEVEL: NO PAIN (0)

## 2023-06-06 NOTE — PATIENT INSTRUCTIONS
Preparing for Your Surgery      Name:  Silvia Otero   MRN:  [id number]   :  1995   Today's Date:  2023       Arriving for surgery:  Surgery date:  23  Arrival time:  5:00am  Surgery time: 7:30am    Please come to:     Please come to:      [facility] [address]  [address] [place]  [address]      The [facility] Patient /Visitor Ramp is located at [address]. Patients and visitors who self-park will receive the reduced hospital parking rate. If the Patient /Visitor Ramp is full, please follow the signs to the  parking located at the main hospital entrance.     parking is available ( 24 hours/ 7 days a week)    Discounted parking pass options are available for patients and visitors. They can be purchased at the [employer] desk at the main hospital entrance.    -    Stop at the security desk and they will direct surgery patients to the 3rd floor Surgery Waiting Room. [phone]     -  If you are in need of directions, wheelchair or escort please stop at the Information/security desk in the lobby.       What can I eat or drink?  -  You may eat and drink normally up to 8 hours prior to arrival time. (Until 9:30pm on 23)  -  You may have clear liquids until 2 hours prior to arrival time. (Until 3:30am on 23)    Examples of clear liquids:  Water  Clear broth  Juices (apple, white grape, white cranberry  and cider) without pulp  Noncarbonated, powder based beverages  (lemonade and Joseph-Aid)  Sodas (Sprite, 7-Up, ginger ale and seltzer)  Coffee or tea (without milk or cream)  Gatorade    -  No Alcohol or cannabis products for at least 24 hours before surgery.     Which medicines can I take?    Hold Ibuprofen (Advil, Motrin) for 10 day(s) before surgery--per surgeon       Hold Naproxen (Aleve) for 10 days before surgery.    Follow anticoagulation clinic instructions for Warfarin(Coumadin) hold  and Lovenox bridging.    -  DO NOT take these medications the day of surgery:     Aspirin -take last dose 6/29/23      How do I prepare myself?  - Please take 2 showers (one the night prior to surgery and one the morning of surgery) using Scrubcare or Hibiclens soap.    Use this soap only from the neck to your toes.     Leave the soap on your skin for one minute--then rinse thoroughly.      You may use your own shampoo and conditioner. No other hair products.   - Please remove all jewelry and body piercings.  - No lotions, deodorants or fragrance.  - No makeup or fingernail polish.   - Bring your ID and insurance card.    -If you have a Deep Brain Stimulator, Spinal Cord Stimulator, or any Neuro Stimulator device---you must bring the remote control to the hospital.        Covid testing policy as of 12/06/2022  Your surgeon will notify and schedule you for a COVID test if one is needed before surgery--please direct any questions or COVID symptoms to your surgeon      Questions or Concerns:    - For any questions regarding the day of surgery or your hospital stay, please contact the Pre Admission Nursing Office at [phone].       - If you have health changes between today and your surgery, please call your surgeon.       - For questions after surgery, please call your surgeons office.           Current Visitor Guidelines     Visiting hours: 8 a.m. to 8:30 p.m.     Patients confirmed or suspected to have symptoms of COVID 19 or flu:     No visitors allowed for adult patients.   Children (under age 18) can have 1 named visitor.     People who are sick or showing symptoms of COVID 19 or flu:    Are not allowed to visit patients--we can only make exceptions in special situations.       Please follow these guidelines for your visit:          Please maintain social distance          Masking is optional--however at times you may be asked to wear a mask for the safety of yourself and others     Clean your hands with  alcohol hand . Do this when you arrive at and leave the building and patient room,    And again after you touch your mask or anything in the room.     Go directly to and from the room you are visiting.     Stay in the patient s room during your visit. Limit going to other places in the hospital as much as possible     Leave bags and jackets at home or in the car.     For everyone s health, please don t come and go during your visit. That includes for smoking   during your visit.

## 2023-06-06 NOTE — H&P
Pre-Operative H & P     CC:  Preoperative exam to assess for increased cardiopulmonary risk while undergoing surgery and anesthesia.    Date of Encounter: 6/6/2023  Primary Care Physician:  America Christianson     Reason for visit:   Encounter Diagnosis   Name Primary?     Pre-op evaluation Yes       HPI  Silvia Otero is a 27 year old female who presents for pre-operative H & P in preparation for  Procedure Information     Case: [id number] Date/Time: 06/30/23 0730    Procedure: STERNOTOMY, REPEAT, WITH TRICUSPID VALVE REPAIR POSSIBLE REPLACEMENT AND ANY associated procedures (Chest)    Anesthesia type: General    Diagnosis: Tricuspid regurgitation [I07.1]    Pre-op diagnosis: Tricuspid regurgitation [I07.1]    Location:  OR [address] [place] [address] OR    Providers: Bryce Nowak MD          Patient is being evaluated for comorbid conditions of complete heart block now with pacemaker present, anemia    Ms. Otero has a history of a tricuspid valve replacement in 2016 with mechanical valve. She was seen by Dr. Nowak for tricuspid regurgitation and is now scheduled for the above procedure.     History is obtained from the patient and chart review    Hx of abnormal bleeding or anti-platelet use: ASA 81 mg, warfarin    Menstrual history: Patient's last menstrual period was 05/23/2023 (exact date).    Past Medical History  No past medical history on file.    Past Surgical History  Past Surgical History:   Procedure Laterality Date     EP PACEMAKER GENERATOR REPLACEMENT- DUAL N/A 2/24/2023    Procedure: Pacemaker Generator Replacement Dual;  Surgeon: Jordon Regan MD;  Location:  HEART CARDIAC CATH LAB       Prior to Admission Medications  Current Outpatient Medications   Medication Sig Dispense Refill     aspirin (ASA) 81 MG EC tablet Take 1 tablet (81 mg) by mouth daily 100 tablet 0     warfarin ANTICOAGULANT (COUMADIN) 10 MG tablet take 15 mg every Mon, Thu, Sat; 10 mg all other days of the week. Takes in the  evening.       ferrous sulfate (FEROSUL) 325 (65 Fe) MG tablet Take 325 mg by mouth (Patient not taking: Reported on 6/5/2023)         Allergies  No Known Allergies    Social History  Social History     Socioeconomic History     Marital status:      Spouse name: Not on file     Number of children: Not on file     Years of education: Not on file     Highest education level: Not on file   Occupational History     Not on file   Tobacco Use     Smoking status: Never     Smokeless tobacco: Never   Vaping Use     Vaping status: Never Used   Substance and Sexual Activity     Alcohol use: Not Currently     Drug use: Not Currently     Sexual activity: Not on file   Other Topics Concern     Not on file   Social History Narrative     Not on file     Social Determinants of Health     Financial Resource Strain: Not on file   Food Insecurity: Not on file   Transportation Needs: Not on file   Physical Activity: Not on file   Stress: Not on file   Social Connections: Not on file   Intimate Partner Violence: Not on file   Housing Stability: Not on file       Family History  Family History   Problem Relation Age of Onset     Anesthesia Reaction No family hx of      Deep Vein Thrombosis (DVT) No family hx of        Review of Systems  The complete review of systems is negative other than noted in the HPI or here.   Anesthesia Evaluation   Pt has had prior anesthetic.     No history of anesthetic complications       ROS/MED HX  ENT/Pulmonary:  - neg pulmonary ROS  (-) tobacco use   Neurologic:  - neg neurologic ROS  (-) no seizures and no CVA   Cardiovascular:     (+) -----Taking blood thinners pacemaker, type: St. Aniceto, settings: VVIR , valvular problems/murmurs Previous cardiac testing   Echo: Date: 3/2023 Results:  Interpretation Summary     1. Left ventricular systolic function is normal. The visual ejection fraction  is 60-65%.  2. The right ventricle is mildly dilated. The right ventricular systolic  function is mild  "to moderately reduced.  3. The right atrium is moderately dilated.  4. S/P mechanical TVR; leaflets poorly visualized. Elevated mean gradient 15  mmHg at HR 70 bpm worrisome for prosthesis malfunction.  Compared to the piror study on 2/27/2023, no change in TV gradient.  Stress Test: Date: Results:    ECG Reviewed: Date: 3/1/23 Results:  Atrial flutter, pacemaker  Cath: Date: Results:   Congenital heart disease: tricuspid valve replacement.   METS/Exercise Tolerance: >4 METS Comment: Walking on treadmill without exertional symptoms    Hematologic:     (+) history of blood transfusion, no previous transfusion reaction,  (-) history of blood clots   Musculoskeletal:  - neg musculoskeletal ROS     GI/Hepatic:  - neg GI/hepatic ROS  (-) GERD   Renal/Genitourinary:  - neg Renal ROS     Endo:  - neg endo ROS  (-) chronic steroid usage   Psychiatric/Substance Use:  - neg psychiatric ROS     Infectious Disease:  - neg infectious disease ROS     Malignancy:       Other:            /82 (BP Location: Right arm, Patient Position: Sitting, Cuff Size: Adult Regular)   Pulse 72   Temp 97.7  F (36.5  C) (Oral)   Resp 16   Ht 1.753 m (5' 9\")   Wt 79.9 kg (176 lb 3.2 oz)   LMP 05/23/2023 (Exact Date)   SpO2 100%   Breastfeeding No   BMI 26.02 kg/m      Physical Exam   Constitutional: Awake, alert, cooperative, no apparent distress, and appears stated age.  Eyes: Pupils equal, round and reactive to light, extra ocular muscles intact, sclera clear, conjunctiva normal.  HENT: Normocephalic, oral pharynx with moist mucus membranes, good dentition.  Respiratory: Clear to auscultation bilaterally, no crackles or wheezing.  Cardiovascular: Regular rate and rhythm, mechanical click c/w valve. Carotids no bruits. No edema. Palpable pulses to radial arteries.   GI: Normal bowel sounds, soft, non-distended, non-tender  Genitourinary:  deferred  Skin: Warm and dry.    Musculoskeletal: Full ROM of neck. There is no redness, warmth, " or swelling of the exposed joints. Gross motor strength is normal.    Neurologic: Awake, alert, oriented to name, place and time. Cranial nerves II-XII are grossly intact. Gait is normal.   Neuropsychiatric: Calm, cooperative. Normal affect.     Prior Labs/Diagnostic Studies   All labs and imaging personally reviewed     EKG/ stress test - if available please see in ROS above   Echo result w/o MOPS: Interpretation Summary 1. Left ventricular systolic function is normal. The visual ejection fractionis 60-65%.2. The right ventricle is mildly dilated. The right ventricular systolicfunction is mild to moderately reduced.3. The right atrium is moderately dilated.4. S/P mechanical TVR; leaflets poorly visualized. Elevated mean gradient 15mmHg at HR 70 bpm worrisome for prosthesis malfunction.Compared to the piror study on 2/27/2023, no change in TV gradient.           View : No data to display.                  The patient's records and results personally reviewed by this provider.     Outside records reviewed from: Care Everywhere    LAB/DIAGNOSTIC STUDIES TODAY: multiple labs ordered per surgery team    Assessment      Silvia Otero is a 27 year old female seen as a PAC referral for risk assessment and optimization for anesthesia.    Plan/Recommendations  Pt will be optimized for the proposed procedure.  See below for details on the assessment, risk, and preoperative recommendations    NEUROLOGY  - No history of TIA, CVA or seizure  -Post Op delirium risk factors:  No risk identified    ENT  - No current airway concerns.  Will need to be reassessed day of surgery.  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 S/p mechanical tricuspid valve replacement in 2016. Now with severe mechanical valve dysfunction with the above procedure planned.    -complete heart block. St. Aniceto pacemaker present  -previous cardiac testing above  - METS (Metabolic  "Equivalents)  Patient performs 4 or more METS exercise without symptoms            Total Score: 0        PULMONARY  COSME Low Risk            Total Score: 0      - Denies asthma or inhaler use  - Tobacco History      History   Smoking Status     Never   Smokeless Tobacco     Never       GI  PONV High Risk  Total Score: 3           1 AN PONV: Pt is Female    1 AN PONV: Patient is not a current smoker    1 AN PONV: Intended Post Op Opioids        /RENAL  - Baseline Creatinine  0.6, will update today    ENDOCRINE    - BMI: Estimated body mass index is 26.02 kg/m  as calculated from the following:    Height as of this encounter: 1.753 m (5' 9\").    Weight as of this encounter: 79.9 kg (176 lb 3.2 oz).  Overweight (BMI 25.0-29.9)  - No history of Diabetes Mellitus    HEME  VTE Low Risk 0.26%            Total Score: 0      - Coagulopathy second to Warfarin (Coumadin, Jantoven). AC clinic will contact patient with anticoagulation plan for upcoming surgery   - Platelet disfunction second to Aspirin (Valarie, many others) last dose 6/29 for upcoming surgery  -anemia. Follows with hematology. She is taking oral iron and hgb has improved from 7 to 11.2. She did not tolerate IV iron    MSK  -0/5 on frailty screening. PM&R referral not indicated     Different anesthesia methods/types have been discussed with the patient, but they are aware that the final plan will be decided by the assigned anesthesia provider on the date of service.    The patient is optimized for their procedure. AVS with information on surgery time/arrival time, meds and NPO status given by nursing staff. No further diagnostic testing indicated.      On the day of service:     Prep time: 15 minutes  Visit time: 16 minutes  Documentation time: 5 minutes  ------------------------------------------  Total time: 36 minutes      Rani Tripp PA-C  Preoperative Assessment Center  [facility]  Clinic and Surgery Center  Phone: " [phone]  Fax: [phone]

## 2023-06-06 NOTE — PROGRESS NOTES
ANTICOAGULATION MANAGEMENT     Silvia Otero 27 year old female is on warfarin with subtherapeutic INR result. (Goal INR 3.5-4.0)    Recent labs: (last 7 days)     06/06/23  1434   INR 1.95*       ASSESSMENT       Source(s): Chart Review and Patient/Caregiver Call       Warfarin doses taken: Silvia has been taking more warfarin than directed at last INR on 4/25/23. She has continued same dose of 10 mg M/W/F and 15 mg all other days.    Diet: No new diet changes identified    Medication/supplement changes: None noted    New illness, injury, or hospitalization: No    Signs or symptoms of bleeding or clotting: No    Previous result: Supratherapeutic    Additional findings: Upcoming surgery/procedure 6/30/23 valve repair/replacement. [facility] notified 5/26/23         PLAN     Recommended plan for no diet, medication or health factor changes affecting INR     Dosing Instructions: booster dose then Increase your warfarin dose (16.6% change) with next INR in 1 week       Summary  As of 6/6/2023    Full warfarin instructions:  6/6: 20 mg; Otherwise 15 mg every day   Next INR check:  6/13/2023             I left a detailed voice message for Silvia to take 20 mg of warfarin tonight and call back [phone] to discuss further dosing instructions.    Education provided:     Goal range and lab monitoring: goal range and significance of current result and Importance of therapeutic range    Call INR nurse line to discuss dosing changes.    Plan made with [facility] Pharmacist Zelda Barrientos, RN  Anticoagulation Clinic  6/6/2023    _______________________________________________________________________     Anticoagulation Episode Summary     Current INR goal:  Other - see comment   TTR:  20.7 % (2.9 mo)   Target end date:  Indefinite   Send INR reminders to:  [facility] HEART INR NURSE    Indications    Mural thrombus of heart [I51.3]  History of tricuspid valve replacement with mechanical valve [Z95.2]  Atrial flutter  unspecified  type (H) [I48.92]           Comments:  INR goal 3.5-4         Anticoagulation Care Providers     Provider Role Specialty Phone number    Eduardo Araiza NP Referring Cardiovascular Disease [phone]    Renee Huynh NP Referring  [phone]

## 2023-06-07 ENCOUNTER — TELEPHONE (OUTPATIENT)
Dept: CARDIOLOGY | Facility: CLINIC | Age: 28
End: 2023-06-07
Payer: COMMERCIAL

## 2023-06-07 LAB
ATRIAL RATE - MUSE: 250 BPM
DIASTOLIC BLOOD PRESSURE - MUSE: NORMAL MMHG
INTERPRETATION ECG - MUSE: NORMAL
P AXIS - MUSE: NORMAL DEGREES
PR INTERVAL - MUSE: 96 MS
PREALB SERPL IA-MCNC: 15 MG/DL (ref 15–45)
QRS DURATION - MUSE: 22 MS
QT - MUSE: 138 MS
QTC - MUSE: 229 MS
R AXIS - MUSE: 0 DEGREES
SYSTOLIC BLOOD PRESSURE - MUSE: NORMAL MMHG
T AXIS - MUSE: 91 DEGREES
VENTRICULAR RATE- MUSE: 166 BPM

## 2023-06-07 RX ORDER — WARFARIN SODIUM 10 MG/1
TABLET ORAL
Qty: 135 TABLET | Refills: 0 | Status: ON HOLD | OUTPATIENT
Start: 2023-06-07 | End: 2023-07-26

## 2023-06-07 NOTE — TELEPHONE ENCOUNTER
Silvia asked for a refill of her Aspirin to be sent in to [facility].    Please review and advise if this is appropriate. Current ASA Rx is not active in chart.    Thank you,    Rebecca Barrientos RN

## 2023-06-07 NOTE — PROGRESS NOTES
ANTICOAGULATION MANAGEMENT     ASSESSMENT     I spoke to Silvia. Her phone  and she did not get the message to take 20 mg of warfarin last night. She took 15 mg of warfarin last night. She will take the boost dose of 20 mg tonight. I also asked if she would be willing to do venous INR's and she said she wants to do the finger pokes instead.       PLAN     Recommended plan for ongoing change(s) affecting INR     Dosing Instructions: booster dose then Increase your warfarin dose (16.6% change) with next INR in 1 week       Summary  As of 2023    Full warfarin instructions:  : 20 mg; Otherwise 15 mg every day   Next INR check:  2023             Telephone call with Silvia who verbalizes understanding and agrees to plan and who agrees to plan and repeated back plan correctly    Lab visit scheduled    Education provided:     Goal range and lab monitoring: goal range and significance of current result, Importance of therapeutic range and Importance of following up at instructed interval    Symptom monitoring: monitoring for bleeding signs and symptoms and monitoring for clotting signs and symptoms    Contact [phone] with any changes, questions or concerns.     Plan made with [facility] Pharmacist Zelda Barrientos, RN  Anticoagulation Clinic  2023    _______________________________________________________________________     Anticoagulation Episode Summary     Current INR goal:  Other - see comment   TTR:  20.7 % (2.9 mo)   Target end date:  Indefinite   Send INR reminders to:  [facility] HEART INR NURSE    Indications    Mural thrombus of heart [I51.3]  History of tricuspid valve replacement with mechanical valve [Z95.2]  Atrial flutter  unspecified type (H) [I48.92]           Comments:  INR goal 3.5-4         Anticoagulation Care Providers     Provider Role Specialty Phone number    Eduardo Araiza NP Referring Cardiovascular Disease [phone]    Renee Huynh NP Referring  [phone]

## 2023-06-07 NOTE — PROGRESS NOTES
Unable to reach Silvia today.    Left message to increase Warfarin to 15 mg daily and call back to discuss today.    Follow up required to discuss out of range result , discuss dosing instructions and confirm understanding of instructions and see if she is agreeable to do venous INR's going forward.    Rebecca Barrientos RN  Anticoagulation Clinic  6/7/2023

## 2023-06-07 NOTE — TELEPHONE ENCOUNTER
Recommend contacting the cardiovascular surgery team at the [facility] or at the [facility] regarding if she needs to be on aspirin or not in addition to warfarin. Unclear from chart review if she is planning to follow-up with the surgical team at the [facility] or with [facility] as she has seen both teams within the last few months. Thank you!

## 2023-06-14 ENCOUNTER — TELEPHONE (OUTPATIENT)
Dept: ANTICOAGULATION | Facility: CLINIC | Age: 28
End: 2023-06-14

## 2023-06-14 NOTE — TELEPHONE ENCOUNTER
ANTICOAGULATION     Silvia Otero is overdue for INR check.      Spoke with Silvia and scheduled lab appointment on 6/16    Rebecca Barrientos RN

## 2023-06-16 ENCOUNTER — LAB (OUTPATIENT)
Dept: LAB | Facility: CLINIC | Age: 28
End: 2023-06-16
Payer: COMMERCIAL

## 2023-06-16 ENCOUNTER — ANTICOAGULATION THERAPY VISIT (OUTPATIENT)
Dept: ANTICOAGULATION | Facility: CLINIC | Age: 28
End: 2023-06-16

## 2023-06-16 DIAGNOSIS — Z95.2 HISTORY OF TRICUSPID VALVE REPLACEMENT WITH MECHANICAL VALVE: ICD-10-CM

## 2023-06-16 DIAGNOSIS — I48.92 ATRIAL FLUTTER, UNSPECIFIED TYPE (H): ICD-10-CM

## 2023-06-16 DIAGNOSIS — I51.3 MURAL THROMBUS OF HEART: ICD-10-CM

## 2023-06-16 DIAGNOSIS — I51.3 MURAL THROMBUS OF HEART: Primary | ICD-10-CM

## 2023-06-16 LAB — INR BLD: 3.9 (ref 0.9–1.1)

## 2023-06-16 PROCEDURE — 85610 PROTHROMBIN TIME: CPT

## 2023-06-16 PROCEDURE — 36415 COLL VENOUS BLD VENIPUNCTURE: CPT

## 2023-06-16 NOTE — PROGRESS NOTES
ANTICOAGULATION MANAGEMENT     Silvia Otero 27 year old female is on warfarin with therapeutic INR result. (Goal INR Other - see comment)    Recent labs: (last 7 days)     06/16/23  0841   INR 3.9*       ASSESSMENT       Source(s): Chart Review    Previous INR was Subtherapeutic    Medication, diet, health changes since last INR     Upcoming surgery:  6/30/23 open heart surgery, will need to hold warfarin for 5 days and bridge, sent to [facility] ED on 5/26/23-pending             PLAN     Recommended plan for no diet, medication or health factor changes affecting INR     Dosing Instructions: Continue your current warfarin dose.  Will call patient next week to review warfarin hold and bridge plan prior to surgery.         Summary  As of 6/16/2023    Full warfarin instructions:  6/25: Hold; 6/26: Hold; 6/27: Hold; 6/28: Hold; 6/29: Hold; Otherwise 15 mg every day   Next INR check:  7/5/2023             Detailed voice message left for Silvia with dosing instructions and follow up date.     Contact [phone] to schedule and with any changes, questions or concerns.     Education provided:     Goal range and lab monitoring: goal range and significance of current result    Contact [phone] with any changes, questions or concerns.     Plan made per [facility] anticoagulation protocol    Francesca Estrada, RN  Anticoagulation Clinic  6/16/2023    _______________________________________________________________________     Anticoagulation Episode Summary     Current INR goal:  Other - see comment   TTR:  23.8 % (3.2 mo)   Target end date:  Indefinite   Send INR reminders to:  [facility] HEART INR NURSE    Indications    Mural thrombus of heart [I51.3]  History of tricuspid valve replacement with mechanical valve [Z95.2]  Atrial flutter  unspecified type (H) [I48.92]           Comments:  INR goal 3.5-4         Anticoagulation Care Providers     Provider Role Specialty Phone number    Eduardo Araiza NP Referring Cardiovascular  Disease [phone]    Renee Huynh, NP Referring  [phone]

## 2023-06-19 ENCOUNTER — TELEPHONE (OUTPATIENT)
Dept: ANTICOAGULATION | Facility: CLINIC | Age: 28
End: 2023-06-19
Payer: COMMERCIAL

## 2023-06-19 ENCOUNTER — TELEPHONE (OUTPATIENT)
Dept: CARDIOLOGY | Facility: CLINIC | Age: 28
End: 2023-06-19
Payer: COMMERCIAL

## 2023-06-19 DIAGNOSIS — I51.3 MURAL THROMBUS OF HEART: Primary | ICD-10-CM

## 2023-06-19 DIAGNOSIS — I36.1 NONRHEUMATIC TRICUSPID VALVE REGURGITATION: Primary | ICD-10-CM

## 2023-06-19 DIAGNOSIS — I48.92 ATRIAL FLUTTER, UNSPECIFIED TYPE (H): ICD-10-CM

## 2023-06-19 DIAGNOSIS — Z95.2 HISTORY OF TRICUSPID VALVE REPLACEMENT WITH MECHANICAL VALVE: ICD-10-CM

## 2023-06-19 NOTE — TELEPHONE ENCOUNTER
Patient called to inform that her surgery from 6/30/23 will be rescheduled to 7/20/23 due to physician schedule complications; patient informed to continue her medications as instructed in the letter. Her anticoagulation clinic will be informed of the new date to coordinate lovenox bridge prior to surgery. Informed that she will need to repeat EKG/LAB/PAC because they need to be 30 days prior to surgery date. Patient verbalized understanding and agreed to the plan.

## 2023-06-20 ENCOUNTER — TELEPHONE (OUTPATIENT)
Dept: CARDIOLOGY | Facility: CLINIC | Age: 28
End: 2023-06-20
Payer: COMMERCIAL

## 2023-06-20 NOTE — TELEPHONE ENCOUNTER
LVM for pt informing of pre op appts scheduled. Pt does check MyChart and knows to be watching for appts. She will call me with questions

## 2023-06-20 NOTE — TELEPHONE ENCOUNTER
Bartolome Azul, Francesca Duckworth RN; Zelda Aguilera, RPH  Hello,   Wanted to inform you that patient's surgery with Dr. Nowak is rescheduled for 7/20/23. She will still need Lovenox bridge prior to the procedure. Please let me know if I can be of assistance.

## 2023-06-21 NOTE — TELEPHONE ENCOUNTER
FUTURE VISIT INFORMATION      SURGERY INFORMATION:    Date: 23    Location: uu or    Surgeon:  Bryce Nowak MD Huddleston, Stephen James, MD    Anesthesia Type:  general    Procedure: STERNOTOMY, REPEAT, WITH TRICUSPID VALVE REPAIR POSSIBLE REPLACEMENT AND ANY associated procedures      RECORDS REQUESTED FROM:       Primary Care Provider: America Christianson MD    Pertinent Medical History: Atrial flutter    Most recent EKG+ Tracin23    Most recent ECHO: 23

## 2023-06-25 NOTE — PROGRESS NOTES
HPI  Silvia Otero is a 27 year old female who presents for evaluation for repeat tricuspid valve surgery.     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     She presents today with complaints of lightheadedness, intermittent chest heaviness and shortness of breath with exertion.     Of note, she recently migrated from [place]  last year and is yet to establish care here in the . 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 Also at that time, her course was complicated by a third degree heart block requiring a pace maker.       History is obtained from the patient and chart review          Past Medical History  Past Medical History   No past medical history on file.        Past Surgical History  Past Surgical History         Past Surgical History:   Procedure Laterality Date     EP PACEMAKER GENERATOR REPLACEMENT- DUAL N/A 2/24/2023     Procedure: Pacemaker Generator Replacement Dual;  Surgeon: Jordon Regan MD;  Location:  [facility]            Prior to Admission Medications  Active Medications          Current Outpatient Medications   Medication Sig Dispense Refill     aspirin (ASA) 81 MG EC tablet Take 1 tablet (81 mg) by mouth daily 100 tablet 0     warfarin ANTICOAGULANT (COUMADIN) 10 MG tablet take 15 mg every Mon, Thu, Sat; 10 mg all other days of the week. Takes in the evening.         ferrous sulfate (FEROSUL) 325 (65 Fe) MG tablet Take 325 mg by mouth (Patient not taking: Reported on 6/5/2023)                Allergies  No Known Allergies     Social History  Social History   Social History            Socioeconomic History     Marital status:        Spouse name: Not on file     Number of children: Not on file     Years of education: Not on file     Highest education level: Not on file   Occupational  "History     Not on file   Tobacco Use     Smoking status: Never     Smokeless tobacco: Never   Vaping Use     Vaping status: Never Used   Substance and Sexual Activity     Alcohol use: Not Currently     Drug use: Not Currently     Sexual activity: Not on file   Other Topics Concern     Not on file   Social History Narrative     Not on file      Social Determinants of Health      Financial Resource Strain: Not on file   Food Insecurity: Not on file   Transportation Needs: Not on file   Physical Activity: Not on file   Stress: Not on file   Social Connections: Not on file   Intimate Partner Violence: Not on file   Housing Stability: Not on file            Family History  Family History         Family History   Problem Relation Age of Onset     Anesthesia Reaction No family hx of       Deep Vein Thrombosis (DVT) No family hx of              Review of Systems  The complete review of systems is negative other than noted in the HPI or here.      /82 (BP Location: Right arm, Patient Position: Sitting, Cuff Size: Adult Regular)   Pulse 72   Temp 97.7  F (36.5  C) (Oral)   Resp 16   Ht 1.753 m (5' 9\")   Wt 79.9 kg (176 lb 3.2 oz)   LMP 05/23/2023 (Exact Date)   SpO2 100%   Breastfeeding No   BMI 26.02 kg/m       Physical Exam   Constitutional: Awake, alert, cooperative, no apparent distress, and appears stated age.  Eyes: Pupils equal, round and reactive to light, extra ocular muscles intact, sclera clear, conjunctiva normal.  HENT: Normocephalic, oral pharynx with moist mucus membranes, good dentition.  Respiratory: Clear to auscultation bilaterally, no crackles or wheezing.  Cardiovascular: Regular rate and rhythm, mechanical click c/w valve. Carotids no bruits. No edema. Palpable pulses to radial arteries.   GI: Normal bowel sounds, soft, non-distended, non-tender  Musculoskeletal: Full ROM of neck. There is no redness, warmth, or swelling of the exposed joints. Gross motor strength is normal.  "   Neurologic: no focal deficits  Prior Labs/Diagnostic Studies   All labs and imaging personally reviewed      EKG/ stress test - if available please see in ROS above   Echo result w/o MOPS: Interpretation Summary 1. Left ventricular systolic function is normal. The visual ejection fractionis 60-65%.2. The right ventricle is mildly dilated. The right ventricular systolicfunction is mild to moderately reduced.3. The right atrium is moderately dilated.4. S/P mechanical TVR; leaflets poorly visualized. Elevated mean gradient 15mmHg at HR 70 bpm worrisome for prosthesis malfunction.Compared to the piror study on 2/27/2023, no change in TV gradient.        Procedure  Complete HANK Adult. 3D image acquisition, reconstruction, and real-time  interpretation was performed. HANK Probe #[id number] was also used during the  procedure.  ______________________________________________________________________________  Interpretation Summary     1. Left ventricular systolic function is normal. The visual ejection fraction  is 60-65%.  2. The right ventricle is mildly dilated. The right ventricular systolic  function is mild to moderately reduced.  3. The right atrium is moderately dilated.  4. S/P mechanical TVR; leaflets poorly visualized. Elevated mean gradient 15  mmHg at HR 70 bpm worrisome for prosthesis malfunction.  Compared to the piror study on 2/27/2023, no change in TV gradient.  ______________________________________________________________________________  Left Ventricle  The left ventricle is normal in size. Left ventricular systolic function is  normal. The visual ejection fraction is 60-65%. Left ventricular diastolic  function is normal. No regional wall motion abnormalities noted.     Right Ventricle  The right ventricle is mildly dilated. The right ventricular systolic function  is mild to moderately reduced.     Atria  Normal left atrial size. The right atrium is moderately dilated.     Mitral Valve  Systolic  flattening of MV laeflet. Mild MR>.     Tricuspid Valve  No tricuspid regurgitation. S/P mechanical TVR; leaflets poorly visualized. No  evidence for thrombus Elevated mean gradient 12 mmHg at HR 63 bpm. The  Tricuspid valve mean diastolic gradient is 15.0 mmHg mmHg.     Aortic Valve  The aortic valve is normal in structure and function. No aortic stenosis is  present.     Pulmonic Valve  The pulmonic valve is not well visualized.     Vessels  Normal size aorta. IVC diameter <2.1 cm collapsing >50% with sniff suggests a  normal RA pressure of 3 mmHg.     Pericardium  There is no pericardial effusion.     Rhythm  The rhythm was atrial flutter.     ______________________________________________________________________________  MMode/2D Measurements & Calculations  IVSd: 0.74 cm  LVIDd: 5.7 cm  LVIDs: 3.3 cm  LVPWd: 0.81 cm  FS: 41.4 %     LV mass(C)d: 163.7 grams  LV mass(C)dI: 87.8 grams/m2  Ao root diam: 2.3 cm  asc Aorta Diam: 2.5 cm  LVOT diam: 1.8 cm  LVOT area: 2.5 cm2  RV Base: 4.0 cm  RWT: 0.29  TAPSE: 1.5 cm     Doppler Measurements & Calculations  MV E max kenny: 94.3 cm/sec  MV A max kenny: 79.1 cm/sec  MV E/A: 1.2  MV max P.4 mmHg  MV mean PG: 3.0 mmHg  MV V2 VTI: 33.6 cm  MVA(VTI): 1.3 cm2  MV dec slope: 599.0 cm/sec2  MV dec time: 0.16 sec  Ao V2 max: 152.0 cm/sec  Ao max P.0 mmHg  Ao V2 mean: 114.5 cm/sec  Ao mean P.5 mmHg  Ao V2 VTI: 31.6 cm  FABIANA(I,D): 1.4 cm2  FABIANA(V,D): 1.4 cm2  LV V1 max PG: 3.0 mmHg  LV V1 max: 86.2 cm/sec  LV V1 VTI: 17.2 cm  SV(LVOT): 43.8 ml  SI(LVOT): 23.5 ml/m2  TV V2 max: 242.5 cm/sec  TV max P.6 mmHg  TV mean PG: 15.0 mmHg  TV V2 VTI: 111.8 cm     PA V2 max: 119.0 cm/sec  PA max P.7 mmHg  PA acc time: 0.12 sec  AV Kenny Ratio (DI): 0.57  FABIANA Index (cm2/m2): 0.74  Lateral E/e': 4.1  RV S Kenny: 7.2 cm/sec     ______________________________________________________________________________  Report approved by: Brandon Wetzel 2023 12:05 PM     The patient's  records and results personally reviewed by this provider.       Assessment     Silvia Otero is a 27 year old female with multiple prior tricuspid valve replacement now with elevated gradients across the mechanical tricuspid valve despite anticoagulation, I agree with the plan to consider her for redo TVR with plan to place a tissue valve. I discussed the risks and benefits of surgery including the risks of death, bleeding, stroke, infection, renal failure and arrhythmias. She understands and is willing to proceed with this but would like to wait for a few months prior to surgery,

## 2023-06-26 ENCOUNTER — TELEPHONE (OUTPATIENT)
Dept: CARDIOLOGY | Facility: CLINIC | Age: 28
End: 2023-06-26
Payer: COMMERCIAL

## 2023-06-30 NOTE — TELEPHONE ENCOUNTER
"AUGUSTO-PROCEDURAL ANTICOAGULATION  MANAGEMENT    ASSESSMENT     Warfarin interruption plan for Tricuspid valve repair or possible replacement on 7/20/23.    Indication for Anticoagulation: Mechanical TVR; hx of possible intracardiac thrombus around valve(3/3/23)    PLAN     5 day hold with bridging per Dr. Bryce Nowak      Pre-Procedure:  o Contact Pharmacist if pre-procedure INR out of range  o Hold warfarin for 5 days, until after procedure starting: Sat 7/15/23   o Enoxaparin (Lovenox) 80 mg subq Q 12 hrs (1 mg/kg Q 12 hrs for CrCl >= 60 ml/min and BMI <= 40 kg/m2)   - Start enoxaparin: Sun 7/16 PM  - Last dose of enoxaparin prior to procedure: Wed 7/19 AM  (~24 hours prior to procedure)      Post-Procedure:  o Resume warfarin and enoxaparin per surgical team   o Recheck INR 3-5 days after hospital discharge  ?   Zelda Aguilera, [facility]    SUBJECTIVE/OBJECTIVE     Silvia JOENS Robella, a 27 year old female    Goal INR Range: 3.5-4.0    Wt Readings from Last 3 Encounters:   06/06/23 79.9 kg (176 lb 3.2 oz)   04/12/23 77.1 kg (170 lb)   04/03/23 76.8 kg (169 lb 6.4 oz)      Ideal body weight: 66.2 kg (145 lb 15.1 oz)  Adjusted ideal body weight: 71.7 kg (158 lb 0.8 oz)     Estimated body mass index is 26.02 kg/m  as calculated from the following:    Height as of 6/6/23: 1.753 m (5' 9\").    Weight as of 6/6/23: 79.9 kg (176 lb 3.2 oz).    Lab Results   Component Value Date    INR 3.9 (H) 06/16/2023    INR 1.95 (H) 06/06/2023    INR 4.3 (H) 04/25/2023     Lab Results   Component Value Date    HGB 8.0 (L) 06/06/2023    HCT 27.2 (L) 06/06/2023     (L) 06/06/2023     Lab Results   Component Value Date    CR 0.61 06/06/2023    CR 0.6 06/06/2023    CR 0.65 03/10/2023     Estimated Creatinine Clearance: 158.2 mL/min (based on SCr of 0.61 mg/dL).  "

## 2023-06-30 NOTE — PHARMACY - PREOPERATIVE ASSESSMENT CENTER
Anticoagulation Note - Preoperative Assessment Center (PAC) Pharmacist     Patient was interviewed on June 30, 2023 prior to scheduled PAC clinic appointment. The purpose of this note is to document the perioperative anticoagulation plan outlined by the providers caring for Silvia Otero.     Current Regimen  Anticoagulation Regimen as of June 30, 2023: warfarin - 15 mg daily in the evening.   Indication: Mechanical tricuspid valve w/ stenosis (concern for thrombus?) aflutter   Prescriber:  Dr. Gonzales (managed by [facility] anticoagulation clinic)  Expected Duration of therapy: indefinite  Current medications that may interact with this include: aspirin  INR Goal: 3.5-4 (per CV surgery)    Creatinine   Date Value Ref Range Status   06/06/2023 0.61 0.51 - 0.95 mg/dL Final     Creatinine POCT   Date Value Ref Range Status   06/06/2023 0.6 0.5 - 1.0 mg/dL Final       Perioperative plan  Silvia Otero is scheduled for STERNOTOMY, REPEAT, WITH TRICUSPID VALVE REPAIR POSSIBLE REPLACEMENT AND ANY associated procedures on 7/20/23 with Dr. Nowak and Dr. Kilpatrick and the perioperative anticoagulation plan outlined by CVTS team is outlined in their letter from 6/26/23 and is to take last dose of aspirin on 7/19/23 and last dose of warfarin on 7/14/23.  Patient will be bridged with lovenox per anticoagulation clinic. [facility] aware of the OR date. See their future notes for final bridging plan.        Resumption of anticoagulation after procedure will be based on surgery team assessment of bleeding risks and complications.  This plan may require re-assessment and modification by her primary team in the perioperative setting depending on patients clinical situation.        John Arrieta [facility]  June 30, 2023  12:50 PM

## 2023-06-30 NOTE — PROGRESS NOTES
Preoperative Assessment Center Medication History Note  Medication history completed on June 30, 2023 by this writer prior to patient's PAC appointment. See Epic admission navigator for prior to admission medications. Operating room staff will still need to confirm medications and last dose information on day of surgery.     Medication history interview sources  Patient and CareEverywhere/SureScripts via phone    Changes made to PTA medication list    Added: none    Deleted: none    Changed: ferrous sulfate dose/sig    Allergies reviewed with patient and updates made in EHR: yes    -- No recent (within 30 days) course of antibiotics  -- No recent (within 30 days) course of systemic steroids  -- Reports being on blood thinning medications - see other note.    -- Declines being on any other prescription or over-the-counter medications    Prior to Admission medications    Medication Sig Last Dose Taking? Auth Provider Long Term End Date   aspirin (ASA) 81 MG EC tablet Take 1 tablet (81 mg) by mouth daily Taking Yes Laure Humphrey MD     ferrous sulfate (FEROSUL) 325 (65 Fe) MG tablet Take 325 mg by mouth daily (with breakfast) Taking Yes Reported, Patient     warfarin ANTICOAGULANT (COUMADIN) 10 MG tablet Take 15 mg every day. Or as directed.  Patient taking differently: Take 15 mg by mouth every evening Or as directed. Taking Yes Eduardo Araiza NP            Medication History Completed By: John Arrieta RPH 6/30/2023 12:50 PM

## 2023-07-02 LAB
ABO/RH(D): NORMAL
ANTIBODY SCREEN: NEGATIVE
SPECIMEN EXPIRATION DATE: NORMAL

## 2023-07-03 ENCOUNTER — ANCILLARY PROCEDURE (OUTPATIENT)
Dept: CARDIOLOGY | Facility: CLINIC | Age: 28
End: 2023-07-03
Attending: SURGERY
Payer: COMMERCIAL

## 2023-07-03 ENCOUNTER — ANTICOAGULATION THERAPY VISIT (OUTPATIENT)
Dept: ANTICOAGULATION | Facility: CLINIC | Age: 28
End: 2023-07-03

## 2023-07-03 ENCOUNTER — PRE VISIT (OUTPATIENT)
Dept: SURGERY | Facility: CLINIC | Age: 28
End: 2023-07-03

## 2023-07-03 ENCOUNTER — OFFICE VISIT (OUTPATIENT)
Dept: SURGERY | Facility: CLINIC | Age: 28
End: 2023-07-03
Payer: COMMERCIAL

## 2023-07-03 ENCOUNTER — LAB (OUTPATIENT)
Dept: LAB | Facility: CLINIC | Age: 28
End: 2023-07-03
Payer: COMMERCIAL

## 2023-07-03 VITALS
DIASTOLIC BLOOD PRESSURE: 75 MMHG | WEIGHT: 175 LBS | RESPIRATION RATE: 16 BRPM | OXYGEN SATURATION: 100 % | HEART RATE: 71 BPM | BODY MASS INDEX: 25.92 KG/M2 | SYSTOLIC BLOOD PRESSURE: 111 MMHG | TEMPERATURE: 97.5 F | HEIGHT: 69 IN

## 2023-07-03 DIAGNOSIS — I48.92 ATRIAL FLUTTER, UNSPECIFIED TYPE (H): ICD-10-CM

## 2023-07-03 DIAGNOSIS — I51.3 MURAL THROMBUS OF HEART: Primary | ICD-10-CM

## 2023-07-03 DIAGNOSIS — Z95.2 HISTORY OF TRICUSPID VALVE REPLACEMENT WITH MECHANICAL VALVE: ICD-10-CM

## 2023-07-03 DIAGNOSIS — I36.1 NONRHEUMATIC TRICUSPID VALVE REGURGITATION: Primary | ICD-10-CM

## 2023-07-03 DIAGNOSIS — Z01.818 PREOP EXAMINATION: ICD-10-CM

## 2023-07-03 DIAGNOSIS — I36.1 NONRHEUMATIC TRICUSPID VALVE REGURGITATION: ICD-10-CM

## 2023-07-03 DIAGNOSIS — I07.1 TRICUSPID REGURGITATION: ICD-10-CM

## 2023-07-03 DIAGNOSIS — D64.9 ANEMIA, UNSPECIFIED TYPE: ICD-10-CM

## 2023-07-03 LAB
ALBUMIN SERPL BCG-MCNC: 5 G/DL (ref 3.5–5.2)
ALBUMIN UR-MCNC: 10 MG/DL
ALP SERPL-CCNC: 85 U/L (ref 35–104)
ALT SERPL W P-5'-P-CCNC: 16 U/L (ref 0–50)
ANION GAP SERPL CALCULATED.3IONS-SCNC: 10 MMOL/L (ref 7–15)
APPEARANCE UR: CLEAR
APTT PPP: 34 SECONDS (ref 22–38)
AST SERPL W P-5'-P-CCNC: 27 U/L (ref 0–45)
ATRIAL RATE - MUSE: 258 BPM
BILIRUB SERPL-MCNC: 0.7 MG/DL
BILIRUB UR QL STRIP: NEGATIVE
BUN SERPL-MCNC: 15.3 MG/DL (ref 6–20)
CALCIUM SERPL-MCNC: 9.7 MG/DL (ref 8.6–10)
CHLORIDE SERPL-SCNC: 104 MMOL/L (ref 98–107)
COLOR UR AUTO: ABNORMAL
CREAT SERPL-MCNC: 0.67 MG/DL (ref 0.51–0.95)
DEPRECATED HCO3 PLAS-SCNC: 24 MMOL/L (ref 22–29)
DIASTOLIC BLOOD PRESSURE - MUSE: NORMAL MMHG
ERYTHROCYTE [DISTWIDTH] IN BLOOD BY AUTOMATED COUNT: 20.3 % (ref 10–15)
FERRITIN SERPL-MCNC: 37 NG/ML (ref 6–175)
GFR SERPL CREATININE-BSD FRML MDRD: >90 ML/MIN/1.73M2
GLUCOSE SERPL-MCNC: 76 MG/DL (ref 70–99)
GLUCOSE UR STRIP-MCNC: NEGATIVE MG/DL
HCT VFR BLD AUTO: 34.8 % (ref 35–47)
HGB BLD-MCNC: 10 G/DL (ref 11.7–15.7)
HGB UR QL STRIP: ABNORMAL
INR PPP: 1.75 (ref 0.85–1.15)
INTERPRETATION ECG - MUSE: NORMAL
IRON BINDING CAPACITY (ROCHE): 380 UG/DL (ref 240–430)
IRON SATN MFR SERPL: 13 % (ref 15–46)
IRON SERPL-MCNC: 51 UG/DL (ref 37–145)
KETONES UR STRIP-MCNC: NEGATIVE MG/DL
LEUKOCYTE ESTERASE UR QL STRIP: NEGATIVE
LVEF ECHO: NORMAL
MCH RBC QN AUTO: 23.5 PG (ref 26.5–33)
MCHC RBC AUTO-ENTMCNC: 28.7 G/DL (ref 31.5–36.5)
MCV RBC AUTO: 82 FL (ref 78–100)
NITRATE UR QL: NEGATIVE
P AXIS - MUSE: NORMAL DEGREES
PH UR STRIP: 7 [PH] (ref 5–7)
PLATELET # BLD AUTO: 205 10E3/UL (ref 150–450)
POTASSIUM SERPL-SCNC: 4.2 MMOL/L (ref 3.4–5.3)
PR INTERVAL - MUSE: NORMAL MS
PROT SERPL-MCNC: 7.8 G/DL (ref 6.4–8.3)
QRS DURATION - MUSE: 146 MS
QT - MUSE: 140 MS
QTC - MUSE: 233 MS
R AXIS - MUSE: 104 DEGREES
RBC # BLD AUTO: 4.25 10E6/UL (ref 3.8–5.2)
RBC URINE: >182 /HPF
SODIUM SERPL-SCNC: 138 MMOL/L (ref 136–145)
SP GR UR STRIP: 1.02 (ref 1–1.03)
SQUAMOUS EPITHELIAL: <1 /HPF
SYSTOLIC BLOOD PRESSURE - MUSE: NORMAL MMHG
T AXIS - MUSE: 0 DEGREES
UROBILINOGEN UR STRIP-MCNC: NORMAL MG/DL
VENTRICULAR RATE- MUSE: 167 BPM
WBC # BLD AUTO: 3.6 10E3/UL (ref 4–11)
WBC URINE: 2 /HPF

## 2023-07-03 PROCEDURE — 93325 DOPPLER ECHO COLOR FLOW MAPG: CPT | Performed by: INTERNAL MEDICINE

## 2023-07-03 PROCEDURE — 84134 ASSAY OF PREALBUMIN: CPT | Performed by: SURGERY

## 2023-07-03 PROCEDURE — 99204 OFFICE O/P NEW MOD 45 MIN: CPT | Performed by: PHYSICIAN ASSISTANT

## 2023-07-03 PROCEDURE — 93308 TTE F-UP OR LMTD: CPT | Performed by: INTERNAL MEDICINE

## 2023-07-03 PROCEDURE — 81001 URINALYSIS AUTO W/SCOPE: CPT | Performed by: PATHOLOGY

## 2023-07-03 PROCEDURE — 36415 COLL VENOUS BLD VENIPUNCTURE: CPT | Performed by: PATHOLOGY

## 2023-07-03 PROCEDURE — 86850 RBC ANTIBODY SCREEN: CPT | Performed by: SURGERY

## 2023-07-03 PROCEDURE — 83550 IRON BINDING TEST: CPT | Performed by: PATHOLOGY

## 2023-07-03 PROCEDURE — 83540 ASSAY OF IRON: CPT | Performed by: PATHOLOGY

## 2023-07-03 PROCEDURE — 80053 COMPREHEN METABOLIC PANEL: CPT | Performed by: PATHOLOGY

## 2023-07-03 PROCEDURE — 85027 COMPLETE CBC AUTOMATED: CPT | Performed by: PATHOLOGY

## 2023-07-03 PROCEDURE — 85610 PROTHROMBIN TIME: CPT | Performed by: PATHOLOGY

## 2023-07-03 PROCEDURE — 99000 SPECIMEN HANDLING OFFICE-LAB: CPT | Performed by: PATHOLOGY

## 2023-07-03 PROCEDURE — 85730 THROMBOPLASTIN TIME PARTIAL: CPT | Performed by: PATHOLOGY

## 2023-07-03 PROCEDURE — 93000 ELECTROCARDIOGRAM COMPLETE: CPT | Performed by: INTERNAL MEDICINE

## 2023-07-03 PROCEDURE — 82728 ASSAY OF FERRITIN: CPT | Performed by: PATHOLOGY

## 2023-07-03 PROCEDURE — 93321 DOPPLER ECHO F-UP/LMTD STD: CPT | Performed by: INTERNAL MEDICINE

## 2023-07-03 RX ORDER — ENOXAPARIN SODIUM 100 MG/ML
80 INJECTION SUBCUTANEOUS EVERY 12 HOURS
Qty: 8 ML | Refills: 1 | Status: ON HOLD | OUTPATIENT
Start: 2023-07-03 | End: 2023-07-26

## 2023-07-03 ASSESSMENT — PAIN SCALES - GENERAL: PAINLEVEL: NO PAIN (0)

## 2023-07-03 ASSESSMENT — ENCOUNTER SYMPTOMS
DYSRHYTHMIAS: 1
SEIZURES: 0

## 2023-07-03 ASSESSMENT — LIFESTYLE VARIABLES: TOBACCO_USE: 0

## 2023-07-03 NOTE — H&P
Pre-Operative H & P     CC:  Preoperative exam to assess for increased cardiopulmonary risk while undergoing surgery and anesthesia.    Date of Encounter: 7/3/2023  Primary Care Physician:  America Christianson     Reason for visit: Tricuspid regurgitation    HPI  Silvia Otero is a 27 year old female who presents for pre-operative H & P in preparation for  Procedure Information     Case: [id number] Date/Time: 07/20/23 0730    Procedure: STERNOTOMY, REPEAT, WITH TRICUSPID VALVE REPAIR POSSIBLE REPLACEMENT AND ANY associated procedures (Chest)    Anesthesia type: General    Diagnosis: Tricuspid regurgitation [I07.1]    Pre-op diagnosis: Tricuspid regurgitation [I07.1]    Location:  OR [address] [place] [address] OR    Providers: Bryce Nowak MD          Patient is being evaluated for comorbid conditions of complete heart block now with St. Aniceto pacemaker present, anemia, chronic anticoagulation.     Ms. Otero recently migrated from [place] last year. 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 Also at that time, her course was complicated by a third degree heart block requiring a pace maker. She is now scheduled for the above procedure.     History is obtained from the patient and chart review.    Hx of abnormal bleeding or anti-platelet use: on ASA 81 mg & warfarin    Menstrual history: Patient's last menstrual period was 06/26/2023.:       Past Medical History  Past Medical History:   Diagnosis Date     Cardiac pacemaker in situ      Complete heart block (H)      History of atrial myxoma, congenital      Tricuspid regurgitation        Past Surgical History  Past Surgical History:   Procedure Laterality Date     ATRIAL MYXOMA EXCISION      in infancy     EP PACEMAKER GENERATOR REPLACEMENT- DUAL N/A 02/24/2023    Procedure: Pacemaker Generator Replacement Dual;  Surgeon: Jordon Regan MD;  Location:   HEART CARDIAC CATH LAB     REPLACE VALVE TRICUSPID  2016       Prior to Admission Medications  Current Outpatient Medications   Medication Sig Dispense Refill     aspirin (ASA) 81 MG EC tablet Take 1 tablet (81 mg) by mouth daily 100 tablet 0     ferrous sulfate (FEROSUL) 325 (65 Fe) MG tablet Take 325 mg by mouth daily (with breakfast)       warfarin ANTICOAGULANT (COUMADIN) 10 MG tablet Take 15 mg every day. Or as directed. (Patient taking differently: Take 15 mg by mouth every evening Or as directed.) 135 tablet 0       Allergies  No Known Allergies    Social History  Social History     Socioeconomic History     Marital status:      Spouse name: Not on file     Number of children: Not on file     Years of education: Not on file     Highest education level: Not on file   Occupational History     Not on file   Tobacco Use     Smoking status: Never     Smokeless tobacco: Never   Vaping Use     Vaping Use: Never used   Substance and Sexual Activity     Alcohol use: Never     Drug use: Never     Sexual activity: Not on file   Other Topics Concern     Not on file   Social History Narrative     Not on file     Social Determinants of Health     Financial Resource Strain: Not on file   Food Insecurity: Not on file   Transportation Needs: Not on file   Physical Activity: Not on file   Stress: Not on file   Social Connections: Not on file   Intimate Partner Violence: Not on file   Housing Stability: Not on file       Family History  Family History   Problem Relation Age of Onset     Anesthesia Reaction No family hx of      Deep Vein Thrombosis (DVT) No family hx of        Review of Systems  The complete review of systems is negative other than noted in the HPI or here.     Anesthesia Evaluation   Pt has had prior anesthetic.     No history of anesthetic complications       ROS/MED HX  ENT/Pulmonary:  - neg pulmonary ROS  (-) tobacco use, asthma and sleep apnea   Neurologic:  - neg neurologic ROS  (-) no seizures and  "no CVA   Cardiovascular:     (+) -----Taking blood thinners Instructions Given to patient: on ASA 81 mg & warfarin. pacemaker, Reason placed: PM generator replacement 2/24/23. type: St. Aniceto, settings: VVIR , - Patient is dependent on pacemaker. dysrhythmias, 3rd Deg Heart Block, valvular problems/murmurs Previous cardiac testing   Echo: Date: 3/2023 Results:  Interpretation Summary     1. Left ventricular systolic function is normal. The visual ejection fraction  is 60-65%.  2. The right ventricle is mildly dilated. The right ventricular systolic  function is mild to moderately reduced.  3. The right atrium is moderately dilated.  4. S/P mechanical TVR; leaflets poorly visualized. Elevated mean gradient 15  mmHg at HR 70 bpm worrisome for prosthesis malfunction.  Compared to the piror study on 2/27/2023, no change in TV gradient.  Stress Test: Date: Results:    ECG Reviewed: Date: 3/1/23 Results:  Atrial flutter, pacemaker  Cath: Date: Results:   Congenital heart disease: tricuspid valve replacement.   METS/Exercise Tolerance: >4 METS Comment: Walking on treadmill without exertional symptoms    Hematologic: Comments: Hgb 8.0 on 6/6/23    (+) anemia, history of blood transfusion, no previous transfusion reaction,  (-) history of blood clots   Musculoskeletal:  - neg musculoskeletal ROS     GI/Hepatic:  - neg GI/hepatic ROS  (-) GERD and liver disease   Renal/Genitourinary:  - neg Renal ROS  (-) renal disease   Endo:  - neg endo ROS  (-) Type II DM and chronic steroid usage   Psychiatric/Substance Use:  - neg psychiatric ROS     Infectious Disease:  - neg infectious disease ROS     Malignancy:  - neg malignancy ROS     Other:  - neg other ROS          /75 (BP Location: Right arm, Patient Position: Sitting, Cuff Size: Adult Regular)   Pulse 71   Temp 97.5  F (36.4  C) (Oral)   Resp 16   Ht 1.753 m (5' 9\")   Wt 79.4 kg (175 lb)   LMP 06/26/2023   SpO2 100%   Breastfeeding No   BMI 25.84 kg/m  "     Physical Exam  Constitutional: Awake, alert, cooperative, no apparent distress, and appears stated age.  Eyes: Pupils equal, round and reactive to light, extra ocular muscles intact, sclera clear, conjunctiva normal.  HENT: Normocephalic, oral pharynx with moist mucus membranes, good dentition. No goiter appreciated. No removable dental hardware.  Respiratory: Clear to auscultation bilaterally, no crackles or wheezing. No SOB when supine.  Cardiovascular: Regular rate and rhythm, normal S1 and S2, and 2/6 murmur noted.  Carotids +2, no bruits. No edema. Palpable pulses to radial, DP and PT arteries.   GI: Normal bowel sounds, soft, non-distended, non-tender, no masses palpated.    Lymph/Hematologic: No cervical lymphadenopathy and no supraclavicular lymphadenopathy.  Genitourinary:  deferred  Skin: Warm and dry.  No rashes.   Musculoskeletal: full ROM of neck. There is no redness, warmth, or swelling of the joints. Gross motor strength is normal.    Neurologic: Awake, alert, oriented to name, place and time. Cranial nerves II-XII are grossly intact. Gait is normal. Ambulates from chair to exam table, seats self, lies supine and sits back up w/o assistance.  Neuropsychiatric: Calm, cooperative. Normal affect. Pleasant. Answers questions appropriately, follows commands w/o difficulty.        PRIOR LABS/DIAGNOSTIC STUDIES:    All labs and imaging personally reviewed        Echo result w/o MOPS: Interpretation Summary 1. Left ventricular systolic function is normal. The visual ejection fractionis 60-65%.2. The right ventricle is mildly dilated. The right ventricular systolicfunction is mild to moderately reduced.3. The right atrium is moderately dilated.4. S/P mechanical TVR; leaflets poorly visualized. Elevated mean gradient 15mmHg at HR 70 bpm worrisome for prosthesis malfunction. Compared to the piror study on 2/27/2023, no change in TV gradient.      The patient's records and results personally reviewed by this  "provider.       LAB/DIAGNOSTIC STUDIES TODAY:  CMP, CBC, prealbumin, INR, PTT, T&S, UA      Assessment      Silvia Otero is a 27 year old female seen as a PAC referral for risk assessment and optimization for anesthesia.    Plan/Recommendations  Pt will be optimized for the proposed procedure.  See below for details on the assessment, risk, and preoperative recommendations    NEUROLOGY  - No history of TIA, CVA or seizure    -Post Op delirium risk factors:  No risk identified    ENT  - No current airway concerns.  Will need to be reassessed day of surgery.  Mallampati: II  TM: > 3    CARDIAC  - No history of CAD, Hypertension and Afib   - hx of tricuspid tumor, s/p surgery c/b damage to the tricuspid valve requiring a replacement in 2001 and revision in 2016. Also at that time, her course was complicated by third degree heart block requiring a St. Aniceto pacemaker (generator replaced 2/24/23).  - Echo 3/2023:  EF 60-65%      - METS (Metabolic Equivalents) Walks on treadmill regularly    Patient performs 4 or more METS exercise without symptoms            Total Score: 0      RCRI-Low risk: Class 2 0.9% complication rate            Total Score: 1    RCRI: High Risk Surgery        PULMONARY  COSME Low Risk            Total Score: 0      - Denies asthma or inhaler use  - Tobacco History      History   Smoking Status     Never   Smokeless Tobacco     Never       GI  - Denies GERD  PONV High Risk  Total Score: 3           1 AN PONV: Pt is Female    1 AN PONV: Patient is not a current smoker    1 AN PONV: Intended Post Op Opioids          ENDOCRINE    - BMI: Estimated body mass index is 25.84 kg/m  as calculated from the following:    Height as of this encounter: 1.753 m (5' 9\").    Weight as of this encounter: 79.4 kg (175 lb).  Healthy Weight (BMI 18.5-24.9)  - No history of Diabetes Mellitus    HEME  VTE Low Risk 0.26%            Total Score: 0      - On ASA 81 mg & warfarin. Last dose of aspirin on 7/19/23, last dose of " warfarin on 7/14/23. Will be bridged with lovenox per anticoagulation clinic. See their future notes for final bridging plan.     - Anemia, Hgb today is 10.0 (up from 8.0 on 6/6)      The patient is aware that the final anesthesia plan will be decided by the assigned anesthesia provider on the date of service.    The patient is optimized for their procedure. AVS with information on surgery time/arrival time, meds and NPO status given by nursing staff. No further diagnostic testing indicated.      On the day of service:     Prep time: 14 minutes  Visit time: 25 minutes  Documentation time: 15 minutes  ------------------------------------------  Total time: 54 minutes      Seema Valerio PA-C  Preoperative Assessment Center  [facility]  Clinic and Surgery Center  Phone: [phone]  Fax: [phone]

## 2023-07-03 NOTE — PATIENT INSTRUCTIONS
Preparing for Your Surgery      Name:  Silvia Otero   MRN:  [id number]   :  1995   Today's Date:  7/3/2023       Arriving for surgery:  Surgery date:  23  Arrival time:  5:00 am    Please come to:     Please come to:      [facility] [address]  [address] [place]  [address]      The [facility] Patient /Visitor Ramp is located at [address]. Patients and visitors who self-park will receive the reduced hospital parking rate. If the Patient /Visitor Ramp is full, please follow the signs to the  parking located at the main hospital entrance.     parking is available ( 24 hours/ 7 days a week)    Discounted parking pass options are available for patients and visitors. They can be purchased at the [employer] desk at the main hospital entrance.    -    Stop at the security desk and they will direct surgery patients to the 3rd floor Surgery Waiting Room. [phone]     -  If you are in need of directions, wheelchair or escort please stop at the Information/security desk in the lobby.       What can I eat or drink?  -  You may eat and drink normally up to 8 hours prior to arrival time. (Until 9:00 pm on 23)  -  You may have clear liquids until 2 hours prior to arrival time. (Until 3:00 am on 23)    Examples of clear liquids:  Water  Clear broth  Juices (apple, white grape, white cranberry  and cider) without pulp  Noncarbonated, powder based beverages  (lemonade and Joseph-Aid)  Sodas (Sprite, 7-Up, ginger ale and seltzer)  Coffee or tea (without milk or cream)  Gatorade    -  No Alcohol or cannabis products for at least 24 hours before surgery.     Which medicines can I take?    Hold Multivitamins for 7 days before surgery.  Hold Supplements for 7 days before surgery.  Hold Ibuprofen (Advil, Motrin) for 1 day(s) before surgery--unless otherwise directed by surgeon.  Hold Naproxen (Aleve) for 4 days  before surgery.    -  DO NOT take these medications the day of surgery:  Aspirin - last dose to be 7/19/23  Ferrous sulfate (Iron)  Your coumadin clinic was contacted and they will set up a schedule to stop warfarin (coumadin) - last dose to be 7/14/23 - and to bridge it with medication called Lovenox.     -  PLEASE TAKE these medications the day of surgery:  Not applicable    How do I prepare myself?  - Please take 2 showers (one the night prior to surgery and one the morning of surgery) using Scrubcare or Hibiclens soap.    Use this soap only from the neck to your toes.     Leave the soap on your skin for one minute--then rinse thoroughly.      You may use your own shampoo and conditioner. No other hair products.   - Please remove all jewelry and body piercings.  - No lotions, deodorants or fragrance.  - No makeup or fingernail polish.   - Bring your ID and insurance card.    -If you have a Deep Brain Stimulator, Spinal Cord Stimulator, or any Neuro Stimulator device---you must bring the remote control to the hospital.      ALL PATIENTS GOING HOME THE SAME DAY OF SURGERY ARE REQUIRED TO HAVE A RESPONSIBLE ADULT TO DRIVE AND BE IN ATTENDANCE WITH THEM FOR 24 HOURS FOLLOWING SURGERY.    Covid testing policy as of 12/06/2022  Your surgeon will notify and schedule you for a COVID test if one is needed before surgery--please direct any questions or COVID symptoms to your surgeon      Questions or Concerns:    - For any questions regarding the day of surgery or your hospital stay, please contact the Pre Admission Nursing Office at [phone].       - If you have health changes between today and your surgery, please call your surgeon.       - For questions after surgery, please call your surgeons office.           Current Visitor Guidelines    You may have 2 visitors in the pre op area.    Visiting hours: 8 a.m. to 8:30 p.m.    You may have four visitors during your inpatient hospital stay.    Patients confirmed or  suspected to have symptoms of COVID 19 or flu:     No visitors allowed for adult patients.   Children (under age 18) can have 1 named visitor.     People who are sick or showing symptoms of COVID 19 or flu:    Are not allowed to visit patients--we can only make exceptions in special situations.       Please follow these guidelines for your visit:          Please maintain social distance          Masking is optional--however at times you may be asked to wear a mask for the safety of yourself and others     Clean your hands with alcohol hand . Do this when you arrive at and leave the building and patient room,    And again after you touch your mask or anything in the room.     Go directly to and from the room you are visiting.     Stay in the patient s room during your visit. Limit going to other places in the hospital as much as possible     Leave bags and jackets at home or in the car.     For everyone s health, please don t come and go during your visit. That includes for smoking   during your visit.

## 2023-07-03 NOTE — PROGRESS NOTES
ANTICOAGULATION MANAGEMENT     Silvia Riveraericla 27 year old female is on warfarin with subtherapeutic INR result. (Goal INR Other - see comment)    Recent labs: (last 7 days)     07/03/23  1406   INR 1.75*       ASSESSMENT       Source(s): Chart Review    Previous INR was Subtherapeutic    Medication, diet, health changes since last INR chart reviewed; none identified     Upcoming sternotomy with tricuspid valve repair/replacement on 7/20/23-- hold plan is finalized and instructions need to be given to patient       PLAN     Consulted with Maritza Coates [facility]-- booster dose tonight, initiate enoxaparin, ok to send in Rx that is pended for upcoming procedure.      Unable to reach Silvia today.    LVM and sent [employer] message to take a booster dose of warfarin,  30 mg tonight, and to take 15mg of warfarin tomorrow night. Instructed to initiate enoxaparin 80mg SQ Q12H-- Rx was sent to pharmacy.    Follow up required to confirm warfarin dose taken and assess for changes, discuss out of range result  and discuss dosing instructions and confirm understanding of instructions       Jade Navarro RN  Anticoagulation Clinic  7/3/2023

## 2023-07-05 LAB — PREALB SERPL IA-MCNC: 16 MG/DL (ref 15–45)

## 2023-07-05 NOTE — PROGRESS NOTES
ANTICOAGULATION MANAGEMENT     Silvia Otero 27 year old female is on warfarin with subtherapeutic INR result. (Goal INR 3.5-4.0)    Recent labs: (last 7 days)     07/03/23  1406   INR 1.75*       ASSESSMENT       Source(s): Chart Review and Patient/Caregiver Call       Warfarin doses taken: Missed dose(s) may be affecting INR    Diet: No new diet changes identified    Medication/supplement changes: None noted    New illness, injury, or hospitalization: No    Signs or symptoms of bleeding or clotting: No    Previous result: Therapeutic last visit; previously outside of goal range    Additional findings: Upcoming surgery/procedure sternotomy with repeat tricuspid valve repair/replacement on 7/20/23-- will instruct with 7/10 INR    Patient reports pharmacy texting her yesterday to let her know they were out of her enoxaparin-- called [facility] pharmacy, per pharm tech Rx should be ready for  within 15 minutes and they will notify patient when it is ready for .       PLAN     Recommended plan for temporary change(s) affecting INR     Dosing Instructions: booster dose then continue your current warfarin dose start bridging with Enoxaparin with next INR on Monday       Summary  As of 7/3/2023    Full warfarin instructions:  7/3: 30 mg; Otherwise 15 mg every day   Next INR check:  7/10/2023             Telephone call with Silvia who agrees to plan and repeated back plan correctly-- states she will  the enoxaparin whenever it is ready and start it.    Lab visit scheduled    Education provided:     Symptom monitoring: monitoring for stroke signs and symptoms and when to seek medical attention/emergency care    Lovenox/Heparin education provided: monitoring for signs and symptoms of bruising and bleeding and monitoring for signs and symptoms of clotting     Contact [phone] with any changes, questions or concerns.     Plan made with [facility] Pharmacist Maritza Navarro,  RN  Anticoagulation Clinic  7/5/2023    _______________________________________________________________________     Anticoagulation Episode Summary     Current INR goal:  Other - see comment   TTR:  27.2 % (3.8 mo)   Target end date:  Indefinite   Send INR reminders to:  [facility] HEART INR NURSE    Indications    Mural thrombus of heart [I51.3]  History of tricuspid valve replacement with mechanical valve [Z95.2]  Atrial flutter  unspecified type (H) [I48.92]           Comments:  INR goal 3.5-4         Anticoagulation Care Providers     Provider Role Specialty Phone number    Eduardo Araiza NP Referring Cardiovascular Disease [phone]    Renee Huynh NP Referring  [phone]

## 2023-07-10 ENCOUNTER — TELEPHONE (OUTPATIENT)
Dept: INTERNAL MEDICINE | Facility: CLINIC | Age: 28
End: 2023-07-10

## 2023-07-10 NOTE — TELEPHONE ENCOUNTER
This is not a refill request, routing to clinic team.    DIANE KEARNS RN on 7/10/2023 at 2:09 PM

## 2023-07-10 NOTE — TELEPHONE ENCOUNTER
Patient was instructed last week by writer to start enoxaparin injections due to subtherapeutic INR.    Called and LVM for patient informing her that she should have started the injections already and needs to continue her warfarin dosing at this time. Also reminded that we need an INR ASAP and requested that she call back and schedule one for tomorrow.    ACC to follow-up tomorrow.     Jade Navarro RN  [facility] Anticoagulation  [phone]

## 2023-07-10 NOTE — TELEPHONE ENCOUNTER
General Call      Reason for Call:    enoxaparin ANTICOAGULANT (LOVENOX) 80 MG/0.8ML syringe 8 mL 1 7/3/2023  --   Sig - Route: Inject 0.8 mLs (80 mg) Subcutaneous every 12 hours Before and after procedure as directed - Subcutaneous     warfarin ANTICOAGULANT (COUMADIN) 10 MG tablet 135 tablet 0 6/7/2023  No   Sig: Take 15 mg every day. Or as directed.         What are your questions or concerns:  Patient needs to know when she should start enoxaparin and end warfarin. Please advise.       Could we send this information to you in [employer] or would you prefer to receive a phone call?:   Patient would prefer a phone call   Okay to leave a detailed message?: Yes at Cell number on file:    Telephone Information:   Mobile [phone]

## 2023-07-11 NOTE — TELEPHONE ENCOUNTER
Called and LVM for patient to continue warfarin dosing. Advised to continue enoxaparin injections if she had already started taking them. Also reminded that an INR is necessary ASAP.    ACC to follow-up tomorrow.    Jade Navarro RN  [facility] Anticoagulation  [phone]

## 2023-07-12 NOTE — TELEPHONE ENCOUNTER
Called and spoke with patient.  She has been taking the warfarin and enoxaparin injections as instructed last week-- did not remember about INR appt for 7/10.  Writer discussed the importance of following up as instructed for her safety and ensuring her INR is therapeutic and we have her on the correct warfarin dose. Patient verbalized understanding.    Patient unable to come in today for an INR, but is able to come tomorrow-- scheduled for INR at 14:45 tomorrow at [facility] lab. Reviewed details of time/date/location with patient who verbalized understanding of details.     Informed patient that we will go over procedure plan instructions after tomorrow's INR.      Jade Navarro RN  [facility] Anticoagulation  [phone]

## 2023-07-13 ENCOUNTER — TELEPHONE (OUTPATIENT)
Dept: NURSING | Facility: CLINIC | Age: 28
End: 2023-07-13

## 2023-07-13 ENCOUNTER — LAB (OUTPATIENT)
Dept: LAB | Facility: CLINIC | Age: 28
End: 2023-07-13
Payer: COMMERCIAL

## 2023-07-13 ENCOUNTER — ANTICOAGULATION THERAPY VISIT (OUTPATIENT)
Dept: ANTICOAGULATION | Facility: CLINIC | Age: 28
End: 2023-07-13

## 2023-07-13 DIAGNOSIS — I48.92 ATRIAL FLUTTER, UNSPECIFIED TYPE (H): ICD-10-CM

## 2023-07-13 DIAGNOSIS — I51.3 MURAL THROMBUS OF HEART: Primary | ICD-10-CM

## 2023-07-13 DIAGNOSIS — I51.3 MURAL THROMBUS OF HEART: ICD-10-CM

## 2023-07-13 DIAGNOSIS — Z95.2 HISTORY OF TRICUSPID VALVE REPLACEMENT WITH MECHANICAL VALVE: ICD-10-CM

## 2023-07-13 LAB — INR BLD: 2.7 (ref 0.9–1.1)

## 2023-07-13 PROCEDURE — 85610 PROTHROMBIN TIME: CPT

## 2023-07-13 PROCEDURE — 36416 COLLJ CAPILLARY BLOOD SPEC: CPT

## 2023-07-13 NOTE — PROGRESS NOTES
"ANTICOAGULATION MANAGEMENT     Silvia Otero 27 year old female is on warfarin with subtherapeutic INR result. (Goal INR 3.5-4.0)    Recent labs: (last 7 days)     07/13/23  1437   INR 2.7*       ASSESSMENT       Source(s): Chart Review and Patient/Caregiver Call       Warfarin doses taken: Warfarin taken as instructed    Diet: No new diet changes identified    Medication/supplement changes: None noted    New illness, injury, or hospitalization: No    Signs or symptoms of bleeding or clotting: Yes: patient reports her ankles are bruised; also reported that her \"whole stomach is purple and green\", writer assessed and patient indicated that those areas are hard, very painful and \"swollen\"    Previous result: Subtherapeutic    Additional findings: Bridging with Enoxaparin-- clarifying with provider at what INR result bridging can be stopped         PLAN     Advised patient to be seen in the ER for evaluation-- patient stated that she felt an OV tomorrow would be ok. Writer explained that there is concern for a hematoma; patient continued to push for an OV. Writer stated that we want to confirm that she understands the concern and risks of not being seen right away in the ER, patient again stated that she would like an OV. Patient asked about if she should continue to bridge as well.     Writer attempted to reach RN CC for cardiothoracic surgery; XIN with details and requested call back. Did call after hours line but was unable to reach anyone.     Called patient and recommended ER again, and patient again refused. Patient stated that she is not taking the enoxaparin injections anymore because her stomach hurts too much. Advised she take the warfarin still and monitor for any worsening of symptoms, or new symptoms. Patient verbalized understanding.    Routing to cardiology and will follow-up tomorrow.      Jade Navarro, RN  [facility] Anticoagulation  [phone]    "

## 2023-07-13 NOTE — TELEPHONE ENCOUNTER
INR RN needing to reach provider given patient is declining of RN ED disposition. FNA RN offered to triage patient or to transfer to clinic RN (as they were looking for providers input), but declined as the patient is being followed by cardiology and can only accept orders from cardiology (they have already attempted to reach them).    DIANE KEARNS, RN on 7/13/2023 at 5:04 PM

## 2023-07-14 ENCOUNTER — TELEPHONE (OUTPATIENT)
Dept: CARDIOLOGY | Facility: CLINIC | Age: 28
End: 2023-07-14
Payer: COMMERCIAL

## 2023-07-14 ENCOUNTER — TELEPHONE (OUTPATIENT)
Dept: ANTICOAGULATION | Facility: CLINIC | Age: 28
End: 2023-07-14
Payer: COMMERCIAL

## 2023-07-14 NOTE — TELEPHONE ENCOUNTER
Patient reported bruised, swollen, painful abdomen while on enoxaparin injections. She has been instructed to be seen in ED multiple times and has refused; is requesting an OV in primary care.     Routing to primary care to see about an appt for today. Patient has been advised to go to walk-in care as schedules are likely full-- plans to wait until 3pm and if she does not hear back, will head to walk-in care.      Jade Navarro RN  [facility]  [phone]

## 2023-07-14 NOTE — PROGRESS NOTES
Discussed patient with cardiothoracic surgery RN CC and Zelda Aguil[facility]     Called and spoke with patient. Again recommended ER for evaluation, however this was again refused. Discussed the risks associated with her reported symptoms (hard, bruised, swollen and painful abdomen) and not being evaluated. Patient would like an OV in primary care for evaluation instead; did inform patient that a message could be sent, however on a Friday afternoon they likely will have no openings. Recommended if she is unwilling to go to ER, that walk-in care is also an option-- provided locations of [facility] walk-in care clinics and sent addresses via [employer] per patient request.     Patient has surgery on 7/20/23 and procedure plan is to hold x5 and bridge-- hold would start tomorrow and enoxaparin bridging would start on Sunday 7/16/23. There is significant concerns with patient not being evaluated and resuming enoxaparin for bridging while off warfarin. Advised patient that she needs to be seen in ER, primary care or walk-in care and be cleared to resume enoxaparin prior to resuming for procedure plan. RN CC did inform writer that surgery can be rescheduled if needed and patient was notified that if she is not cleared to resume enoxaparin injections, that her surgery would be rescheduled. Patient verbalized understanding and is agreement with this plan.       Jade Navarro RN  [facility]  [phone]

## 2023-07-14 NOTE — TELEPHONE ENCOUNTER
This RNCC spoke with patient who is reporting bruising/swelling at the Lovenox injection sites. The anticoagulation is managed by [facility] and patient has had swelling in the abdomen and arm after she started taking Lovenox, reports swelling started around Monday. Patient was in contact with the anticoagulation clinic on 7/13/23 who recommended patient go to the ER for evaluation. Patient refused to go to the ER due to financial reasons, states she would visit the anticoagulation clinic if they can set up a clinic visit.   Patient was instructed by this RNCC to get evaluated in the ER today and patient refused again.   Patient plans to visit PCP/anticoagulation in clinic.

## 2023-07-15 ENCOUNTER — HOSPITAL ENCOUNTER (EMERGENCY)
Facility: CLINIC | Age: 28
Discharge: HOME | End: 2023-07-15
Attending: EMERGENCY MEDICINE | Admitting: EMERGENCY MEDICINE
Payer: COMMERCIAL

## 2023-07-15 VITALS
TEMPERATURE: 97 F | DIASTOLIC BLOOD PRESSURE: 72 MMHG | SYSTOLIC BLOOD PRESSURE: 123 MMHG | RESPIRATION RATE: 18 BRPM | HEART RATE: 70 BPM | WEIGHT: 175 LBS | OXYGEN SATURATION: 96 % | BODY MASS INDEX: 25.84 KG/M2

## 2023-07-15 DIAGNOSIS — R79.1 ELEVATED INR: ICD-10-CM

## 2023-07-15 DIAGNOSIS — D69.6 THROMBOCYTOPENIA, UNSPECIFIED (H): ICD-10-CM

## 2023-07-15 LAB
ALBUMIN SERPL-MCNC: 4.6 G/DL (ref 3.5–5)
ALP SERPL-CCNC: 81 U/L (ref 45–120)
ALT SERPL W P-5'-P-CCNC: 18 U/L (ref 0–45)
ANION GAP SERPL CALCULATED.3IONS-SCNC: 10 MMOL/L (ref 5–18)
APTT PPP: 44 SECONDS (ref 22–38)
AST SERPL W P-5'-P-CCNC: 24 U/L (ref 0–40)
BASOPHILS # BLD AUTO: 0 10E3/UL (ref 0–0.2)
BASOPHILS NFR BLD AUTO: 1 %
BILIRUB SERPL-MCNC: 0.5 MG/DL (ref 0–1)
BUN SERPL-MCNC: 18 MG/DL (ref 8–22)
CALCIUM SERPL-MCNC: 9.8 MG/DL (ref 8.5–10.5)
CHLORIDE BLD-SCNC: 109 MMOL/L (ref 98–107)
CO2 SERPL-SCNC: 22 MMOL/L (ref 22–31)
CREAT SERPL-MCNC: 0.8 MG/DL (ref 0.6–1.1)
EOSINOPHIL # BLD AUTO: 0.1 10E3/UL (ref 0–0.7)
EOSINOPHIL NFR BLD AUTO: 3 %
ERYTHROCYTE [DISTWIDTH] IN BLOOD BY AUTOMATED COUNT: 20.7 % (ref 10–15)
GFR SERPL CREATININE-BSD FRML MDRD: >90 ML/MIN/1.73M2
GLUCOSE BLD-MCNC: 80 MG/DL (ref 70–125)
HCT VFR BLD AUTO: 35.7 % (ref 35–47)
HGB BLD-MCNC: 10.5 G/DL (ref 11.7–15.7)
IMM GRANULOCYTES # BLD: 0 10E3/UL
IMM GRANULOCYTES NFR BLD: 0 %
INR PPP: 3.81 (ref 0.85–1.15)
LYMPHOCYTES # BLD AUTO: 0.9 10E3/UL (ref 0.8–5.3)
LYMPHOCYTES NFR BLD AUTO: 30 %
MCH RBC QN AUTO: 24 PG (ref 26.5–33)
MCHC RBC AUTO-ENTMCNC: 29.4 G/DL (ref 31.5–36.5)
MCV RBC AUTO: 82 FL (ref 78–100)
MONOCYTES # BLD AUTO: 0.3 10E3/UL (ref 0–1.3)
MONOCYTES NFR BLD AUTO: 9 %
NEUTROPHILS # BLD AUTO: 1.7 10E3/UL (ref 1.6–8.3)
NEUTROPHILS NFR BLD AUTO: 57 %
NRBC # BLD AUTO: 0 10E3/UL
NRBC BLD AUTO-RTO: 0 /100
PLATELET # BLD AUTO: 134 10E3/UL (ref 150–450)
POTASSIUM BLD-SCNC: 3.8 MMOL/L (ref 3.5–5)
PROT SERPL-MCNC: 7.7 G/DL (ref 6–8)
RBC # BLD AUTO: 4.37 10E6/UL (ref 3.8–5.2)
SODIUM SERPL-SCNC: 141 MMOL/L (ref 136–145)
WBC # BLD AUTO: 2.9 10E3/UL (ref 4–11)

## 2023-07-15 PROCEDURE — 85014 HEMATOCRIT: CPT | Performed by: EMERGENCY MEDICINE

## 2023-07-15 PROCEDURE — 99283 EMERGENCY DEPT VISIT LOW MDM: CPT

## 2023-07-15 PROCEDURE — 36415 COLL VENOUS BLD VENIPUNCTURE: CPT | Performed by: EMERGENCY MEDICINE

## 2023-07-15 PROCEDURE — 85730 THROMBOPLASTIN TIME PARTIAL: CPT | Performed by: EMERGENCY MEDICINE

## 2023-07-15 PROCEDURE — 85610 PROTHROMBIN TIME: CPT | Performed by: EMERGENCY MEDICINE

## 2023-07-15 PROCEDURE — 80053 COMPREHEN METABOLIC PANEL: CPT | Performed by: EMERGENCY MEDICINE

## 2023-07-15 RX ORDER — ENOXAPARIN SODIUM 100 MG/ML
80 INJECTION SUBCUTANEOUS 2 TIMES DAILY
Qty: 4.8 ML | Refills: 0 | Status: ON HOLD | OUTPATIENT
Start: 2023-07-16 | End: 2023-07-23

## 2023-07-15 ASSESSMENT — ACTIVITIES OF DAILY LIVING (ADL): ADLS_ACUITY_SCORE: 33

## 2023-07-15 NOTE — ED TRIAGE NOTES
Pt presents to the ED with c/o of swelling and discoloration after taking Lovenox in place of her coumadin in preporation for tricuspid valve replacement scheduled for the 20th. Pt was advised by cardiology to be assessed in the ED.

## 2023-07-15 NOTE — ED PROVIDER NOTES
Emergency Department Encounter      NAME: Silvia Otero  AGE: 27 year old female  YOB: 1995  MRN: [id number]  EVALUATION DATE & TIME: No admission date for patient encounter.    PCP: America Christianson    ED PROVIDER: Chaz Hdez M.D.      Chief Complaint   Patient presents with     Medication Reaction         FINAL IMPRESSION:  1. Elevated INR    2. Thrombocytopenia, unspecified (H)        MEDICAL DECISION MAKIN:21 PM I met with the patient, obtained history, performed an initial exam, and discussed options and plan for diagnostics and treatment here in the ED.   4:05 PM We discussed the plan for discharge and the patient is agreeable. Reviewed supportive cares, symptomatic treatment, outpatient follow up, and reasons to return to the Emergency Department. Patient to be discharged by ED RN.   3:45 PM Paged cardiothoracic surgery.  3:57 PM I spoke with Dr. Kilpatrick, cardiothoracic surgery.  4:20 PM We discussed the plan for discharge and the patient is agreeable. Reviewed supportive cares, symptomatic treatment, outpatient follow up, and reasons to return to the Emergency Department. Patient to be discharged by ED RN.     Pertinent Labs & Imaging studies reviewed. (See chart for details)    Patient is a 27-year-old female with a history of tricuspid regurg.  The patient was told to come to the ER because of some bruising that she developed.  The patient she has been taking 15 mg of Coumadin a day which is her typical dose.  However she began to use the Lovenox as well on Monday but then stopped this on Wednesday when she developed the bruising.  I reviewed the patient's clinic notes from the anticoagulation clinic when she discussed her situation with the cardiology nurse.  The cardiology nurse recommended that she come to the ER on  and the patient refused.  She again was told to come to the ER for evaluation of the bruising on the  and July and she refused.  On  4 July the patient was told not to use warfarin on the 15th and to resume her Lovenox on the 16th.  I evaluated the patient's bruising and she had bruising where she was injecting Lovenox her abdomen but also a small bruise on her left palm and right forearm.  I checked her lab work and it looks like her platelets were slightly low at 134,000 and her INR was elevated at 3.1.  She had not taken her Coumadin dose today.  I spoke with Dr. Kilpatrick with cardiothoracic surgery.  He was comfortable with the plan of having her stop the Coumadin and resume the Lovenox tomorrow in preparation for her tricuspid valve repair/replacement for the tricuspid regurg on the 20th July at the [facility].    The importance of close follow up was discussed. We reviewed warning signs and symptoms, and I instructed Ms. Otero to return to the emergency department immediately if she develops any new or worsening symptoms. I provided additional verbal discharge instructions. Ms. Otero expressed understanding and agreement with this plan of care, her questions were answered, and she was discharged in stable condition.     Medical Decision Making     History:    Supplemental history from: Documented in chart, if applicable    External Record(s) reviewed: Documented in chart, if applicable.     Work Up:    Chart documentation includes differential considered and any EKGs or imaging independently interpreted by provider, where specified.    In additional to work up documented, I considered the following work up: Documented in chart, if applicable.     External consultation:  Discussion of management with another provider: Cardiothoracic surgery   Complicating factors:    Care impacted by chronic illness: anticoagulated state, complete heart block, tricuspid regurgitation, and atrial myxoma  Care affected by social determinants of health: N/A     Disposition considerations: Discharge. I prescribed injectable  lovenox.      MEDICATIONS GIVEN IN THE EMERGENCY:  Medications - No data to display    NEW PRESCRIPTIONS STARTED AT TODAY'S ER VISIT:  New Prescriptions    No medications on file          =================================================================    HPI    Patient information was obtained from: Patient     Use of : N/A         Silvia Otero is a 27 year old female with a past medical history of tricuspid valve replacement with mechanical valve, Mural thrombus of heart, atrial flutter, Atrial Myxoma Excision, pacemaker in place, Complete heart block , Tricuspid regurgitation, and iron deficiency anemia, who presents with medication reaction.    Patient reports she is on Warfarin and has an upcoming tricuspid valve replacement/repair upcoming on 7/20. Her cardiologist prescribed her Lovenox injections to administer through her abdomen, which she began on 7/10, but had to discontinue use of by 7/12 due to onset of bruising. Patient notes swelling, discoloration and bruising to her right arm, left palm, and bilateral quadrant of her abdomen. She states she also had bruising on her right ankle, but this has since resolved. Patient was told to continue her Warfarin alongside her Lovenox, and discontinue Warfarin use on 7/15. No other medical concerns are expressed at this time.     REVIEW OF SYSTEMS   Review of Systems   Skin:        Positive for swelling, discoloration, and bruising of right arm, left palm, and bilateral quadrant of her abdomen        PAST MEDICAL HISTORY:  Past Medical History:   Diagnosis Date     Cardiac pacemaker in situ      Complete heart block (H)      History of atrial myxoma, congenital      Tricuspid regurgitation        PAST SURGICAL HISTORY:  Past Surgical History:   Procedure Laterality Date     ATRIAL MYXOMA EXCISION      in infancy     EP PACEMAKER GENERATOR REPLACEMENT- DUAL N/A 02/24/2023    Procedure: Pacemaker Generator Replacement Dual;  Surgeon: Jordon Regan  MD Marcus;  Location:  [facility]     REPLACE VALVE TRICUSPID  2016       CURRENT MEDICATIONS:    No current facility-administered medications for this encounter.    Current Outpatient Medications:      aspirin (ASA) 81 MG EC tablet, Take 1 tablet (81 mg) by mouth daily, Disp: 100 tablet, Rfl: 0     enoxaparin ANTICOAGULANT (LOVENOX) 80 MG/0.8ML syringe, Inject 0.8 mLs (80 mg) Subcutaneous every 12 hours Before and after procedure as directed, Disp: 8 mL, Rfl: 1     ferrous sulfate (FEROSUL) 325 (65 Fe) MG tablet, Take 325 mg by mouth daily (with breakfast), Disp: , Rfl:      warfarin ANTICOAGULANT (COUMADIN) 10 MG tablet, Take 15 mg every day. Or as directed. (Patient taking differently: Take 15 mg by mouth every evening Or as directed.), Disp: 135 tablet, Rfl: 0    ALLERGIES:  No Known Allergies    FAMILY HISTORY:  Family History   Problem Relation Age of Onset     Anesthesia Reaction No family hx of      Deep Vein Thrombosis (DVT) No family hx of        SOCIAL HISTORY:   Social History     Socioeconomic History     Marital status:    Tobacco Use     Smoking status: Never     Smokeless tobacco: Never   Vaping Use     Vaping Use: Never used   Substance and Sexual Activity     Alcohol use: Never     Drug use: Never       PHYSICAL EXAM:    Vitals: /72   Pulse 70   Temp 97  F (36.1  C) (Temporal)   Resp 18   Wt 79.4 kg (175 lb)   LMP 06/26/2023   SpO2 96%   BMI 25.84 kg/m     Constitutional: Well developed, well nourished. Comfortable appearing.  HEAD:Normocephalic, atraumatic,   Eyes: PERRLA, EOM intact, conjunctiva clear, no discharge  ENT: mucous membranes moist, nose normal.   Neck- Supple, gross ROM intact.  No JVD.  No palpable nodes.  Pulmonary: Clear to auscultation bilaterally, no respiratory distress, no wheezing, speaks full sentences easily.  Chest: No chest wall tenderness  Cardiovascular: Normal heart rate, regular rhythm, no murmurs. No lower extremity edema, 2+ DP  pulses.   GI: Soft, no tenderness to deep palpation in all quadrants, not distended, no masses.  No hepatosplenomegaly.  Musculoskeletal: Moving all 4 extremities intentionally and without pain. No obvious deformity.  Back: No CVA tenderness  Skin: Warm, dry, 4 cm ecchymotic areas on both sides of abdomen, 3 x 6 cm area of right volar form, and 2 cm on left palm  Neurologic: Alert & oriented x 3, speech clear, moving all extremities spontaneously   Psychiatric: Affect normal, cooperative.     LAB:  All pertinent labs reviewed and interpreted.  Labs Ordered and Resulted from Time of ED Arrival to Time of ED Departure   INR - Abnormal       Result Value    INR 3.81 (*)    PARTIAL THROMBOPLASTIN TIME - Abnormal    aPTT 44 (*)    COMPREHENSIVE METABOLIC PANEL - Abnormal    Sodium 141      Potassium 3.8      Chloride 109 (*)     Carbon Dioxide (CO2) 22      Anion Gap 10      Urea Nitrogen 18      Creatinine 0.80      Calcium 9.8      Glucose 80      Alkaline Phosphatase 81      AST 24      ALT 18      Protein Total 7.7      Albumin 4.6      Bilirubin Total 0.5      GFR Estimate >90     CBC WITH PLATELETS AND DIFFERENTIAL - Abnormal    WBC Count 2.9 (*)     RBC Count 4.37      Hemoglobin 10.5 (*)     Hematocrit 35.7      MCV 82      MCH 24.0 (*)     MCHC 29.4 (*)     RDW 20.7 (*)     Platelet Count 134 (*)     % Neutrophils 57      % Lymphocytes 30      % Monocytes 9      % Eosinophils 3      % Basophils 1      % Immature Granulocytes 0      NRBCs per 100 WBC 0      Absolute Neutrophils 1.7      Absolute Lymphocytes 0.9      Absolute Monocytes 0.3      Absolute Eosinophils 0.1      Absolute Basophils 0.0      Absolute Immature Granulocytes 0.0      Absolute NRBCs 0.0         RADIOLOGY:  No orders to display       Crystal RIGGS, am serving as a scribe to document services personally performed by Dr. Chaz Hdez based on my observation and the provider's statements to me. Chaz RIGGS M.D. attest that  Crystal Merino is acting in a scribe capacity, has observed my performance of the services and has documented them in accordance with my direction.      Chaz Hdez M.D.  Emergency Medicine  [facility] EMERGENCY ROOM  [address] [address]  [phone]  Dept: [phone]       Chaz Hdez MD  07/15/23 8327

## 2023-07-15 NOTE — DISCHARGE INSTRUCTIONS
Stop your Coumadin and do not take anymore.    Restart your Lovenox as prescribed tomorrow    You will need your INR rechecked on Monday, 17 July.  Contact your anticoagulation clinic or your physician's clinic to get this done or if necessary get seen in a urgent care/ER to get it checked.

## 2023-07-17 ENCOUNTER — DOCUMENTATION ONLY (OUTPATIENT)
Dept: ANTICOAGULATION | Facility: CLINIC | Age: 28
End: 2023-07-17
Payer: COMMERCIAL

## 2023-07-17 DIAGNOSIS — I51.3 MURAL THROMBUS OF HEART: Primary | ICD-10-CM

## 2023-07-17 DIAGNOSIS — Z95.2 HISTORY OF TRICUSPID VALVE REPLACEMENT WITH MECHANICAL VALVE: ICD-10-CM

## 2023-07-17 DIAGNOSIS — I48.92 ATRIAL FLUTTER, UNSPECIFIED TYPE (H): ICD-10-CM

## 2023-07-17 NOTE — PROGRESS NOTES
ANTICOAGULATION  MANAGEMENT: Discharge Review    Silvia Otero chart reviewed for anticoagulation continuity of care    Emergency room visit on 7/15/23 for evaluation of abdomen (due to reported symptoms of bruising, swelling and pain).    Discharge disposition: Home    Results:  Recent labs: (last 7 days)     07/13/23  1437 07/15/23  1459   INR 2.7* 3.81*     Anticoagulation inpatient management:     not applicable     Anticoagulation discharge instructions:     Warfarin dosing: approved to begin hold that evening   Bridging: approved to begin bridging Sunday   INR goal change: No      Medication changes affecting anticoagulation: No    Additional factors affecting anticoagulation: No     PLAN     No adjustment to anticoagulation plan needed    Patient contacted with procedure plan instructions-- [facility] will follow-up on hospital discharge following surgery    Anticoagulation Calendar updated      Jade Navarro RN

## 2023-07-17 NOTE — TELEPHONE ENCOUNTER
Patient was seen in ED over the weekend and was cleared to resume enoxaparin injections.  Called and spoke with patient. She has been off warfarin since Saturday and resumed enoxaparin injections Q12H on Sunday.   Advised patient to remain off of her warfarin until after surgery, and to continue enoxaparin injections, with last dose being Wednesday AM. Informed patient that if she takes a dose in the evening on Wednesday, they may cancel surgery. Patient verbalized understanding and repeated instructions back to writer. Will send [employer] message with instructions for reference.    [facility] will follow-up on hospital discharge.       Jade Navarro RN  [facility]  [phone]

## 2023-07-18 NOTE — TELEPHONE ENCOUNTER
Spoke with patient, was already seen in ED on 7/15/23. Scheduled pt for ED f/u with PCP on 8/9/23. Offered to schedule with a different provider to be seen sooner but pt wanted to see pcp.

## 2023-07-19 ASSESSMENT — ENCOUNTER SYMPTOMS
DYSRHYTHMIAS: 1
SEIZURES: 0

## 2023-07-19 ASSESSMENT — LIFESTYLE VARIABLES: TOBACCO_USE: 0

## 2023-07-19 NOTE — ANESTHESIA PREPROCEDURE EVALUATION
"Anesthesia Pre-Procedure Evaluation    Patient: Silvia Otero   MRN: [id number] : 1995        Procedure : Procedure(s):  STERNOTOMY, REPEAT, WITH TRICUSPID VALVE REPAIR AND ANY associated procedures  possible Redo sternotomy replace valve tricuspid          Past Medical History:   Diagnosis Date     Cardiac pacemaker in situ      Complete heart block (H)      History of atrial myxoma, congenital      Tricuspid regurgitation       Past Surgical History:   Procedure Laterality Date     ATRIAL MYXOMA EXCISION      in infancy     EP PACEMAKER GENERATOR REPLACEMENT- DUAL N/A 2023    Procedure: Pacemaker Generator Replacement Dual;  Surgeon: Jordon Regan MD;  Location:  [facility]ATH LAB     REPLACE VALVE TRICUSPID  2016      No Known Allergies   Social History     Tobacco Use     Smoking status: Never     Smokeless tobacco: Never   Substance Use Topics     Alcohol use: Never      Wt Readings from Last 1 Encounters:   07/15/23 79.4 kg (175 lb)        Anesthesia Evaluation   Pt has had prior anesthetic. Type: General.    No history of anesthetic complications       ROS/MED HX  ENT/Pulmonary:  - neg pulmonary ROS  (-) tobacco use, asthma and sleep apnea   Neurologic:  - neg neurologic ROS  (-) no seizures and no CVA   Cardiovascular: Comment: ICD/Pacemaker: Please ensure defibrillator is turned off and pacemaker is set to VOO settings.  : 98%  Mode: VVIR   Underlying Rhythm: Aflutter with CHB and no junctional escape at VVI 30    CT Chest Angio:   1. Postoperative changes of tricuspid valve replacement without evident perivalvular thrombus.   2. Cardiomegaly with significant right atrial enlargement, similar to prior. Sequela of elevated right heart pressures including congested \"nutmeg\" appearance of the enlarged liver with intrahepatic IVC engorgement.  3. Nodular opacity in the left lower lobe, favored infectious or inflammatory.    (+) -----Taking blood thinners Pt has received " instructions: Instructions Given to patient: on ASA 81 mg & warfarin - stopped and bridged with Lovenox. pacemaker, Reason placed: Complete heart block. PM generator replacement 2/24/23. type: [place], settings: VVIR , - Patient is dependent on pacemaker. ICD dysrhythmias, 3rd Deg Heart Block, valvular problems/murmurs Previous cardiac testing   Echo: Date: 7/3/2023 Results:  Mechanical TVR. Mean TV gradient ranges from 7 to 15 mmHg with average mean gradient of 14mmHg. Muld TR though the valve and TR are not properly visualized.    Left Ventricle  Global and regional left ventricular function is normal with an EF of 55-60%.     Right Ventricle  Global right ventricular function is normal.     Mitral Valve  Trace to mild mitral insufficiency is present.     Tricuspid Valve  Mechanical TVR. Mean TV gradient ranges from 7 to 15 mmHg with average mean gradient of 14mmHg. Muld TR though the valve and TR are not properly visualized.  Stress Test: Date: Results:    ECG Reviewed: Date: 7/3/2023 Results:  High HR   atrial flutter and V paced rhhythm   Rightward axis   Non-specific intra-ventricular conduction block  Cath: Date: Results:   (-) hypertension, CAD and stentCongenital heart disease: tricuspid valve replacement.   METS/Exercise Tolerance: >4 METS Comment: Walking on treadmill without exertional symptoms    Hematologic: Comments: Pancytopenia WBC 2.9, Hgb 10.5, Plts 135    (+) anemia, history of blood transfusion, no previous transfusion reaction,  (-) history of blood clots   Musculoskeletal:  - neg musculoskeletal ROS     GI/Hepatic:  - neg GI/hepatic ROS  (-) GERD and liver disease   Renal/Genitourinary:  - neg Renal ROS  (-) renal disease   Endo:  - neg endo ROS  (-) Type I DM, Type II DM, thyroid disease and chronic steroid usage   Psychiatric/Substance Use:  - neg psychiatric ROS     Infectious Disease:  - neg infectious disease ROS     Malignancy:  - neg malignancy ROS     Other:  - neg other ROS           Physical Exam    Airway        Mallampati: II   TM distance: > 3 FB   Neck ROM: full   Mouth opening: > 3 cm    Respiratory Devices and Support         Dental       (+) Completely normal teeth      Cardiovascular          Rhythm and rate: normal   (+) murmur       Pulmonary           breath sounds clear to auscultation           OUTSIDE LABS:  CBC:   Lab Results   Component Value Date    WBC 2.9 (L) 07/15/2023    WBC 3.6 (L) 07/03/2023    HGB 10.5 (L) 07/15/2023    HGB 10.0 (L) 07/03/2023    HCT 35.7 07/15/2023    HCT 34.8 (L) 07/03/2023     (L) 07/15/2023     07/03/2023     BMP:   Lab Results   Component Value Date     07/15/2023     07/03/2023    POTASSIUM 3.8 07/15/2023    POTASSIUM 4.2 07/03/2023    CHLORIDE 109 (H) 07/15/2023    CHLORIDE 104 07/03/2023    CO2 22 07/15/2023    CO2 24 07/03/2023    BUN 18 07/15/2023    BUN 15.3 07/03/2023    CR 0.80 07/15/2023    CR 0.67 07/03/2023    GLC 80 07/15/2023    GLC 76 07/03/2023     COAGS:   Lab Results   Component Value Date    PTT 44 (H) 07/15/2023    INR 3.81 (H) 07/15/2023     POC:   Lab Results   Component Value Date    HCGS Negative 02/24/2023     HEPATIC:   Lab Results   Component Value Date    ALBUMIN 4.6 07/15/2023    PROTTOTAL 7.7 07/15/2023    ALT 18 07/15/2023    AST 24 07/15/2023    ALKPHOS 81 07/15/2023    BILITOTAL 0.5 07/15/2023     OTHER:   Lab Results   Component Value Date    A1C 5.2 06/06/2023    DENIS 9.8 07/15/2023    MAG 2.0 02/24/2023    TSH 2.39 02/24/2023       Anesthesia Plan    ASA Status:  4   NPO Status:  NPO Appropriate    Anesthesia Type: General.     - Airway: ETT   Induction: Intravenous, Propofol.   Maintenance: Balanced.   Techniques and Equipment:     - Lines/Monitors: 2nd IV, Arterial Line, Central Line, CVP, BIS, NIRS, HANK            HANK Absolute Contra-indication: NONE            HANK Relative Contra-indication: NONE     - Blood: Blood in Room, PRBC     - Drips/Meds: Norepi, Vasopressin, Epinephrine,  Nicardipine (and Amicar)     Consents    Anesthesia Plan(s) and associated risks, benefits, and realistic alternatives discussed. Questions answered and patient/representative(s) expressed understanding.     - Discussed: Risks, Benefits and Alternatives for the PROCEDURE were discussed     - Discussed with:  Patient      - Extended Intubation/Ventilatory Support Discussed: Yes.      - Patient is DNR/DNI Status: No    Use of blood products discussed: Yes.     - Discussed with: Patient.     - Consented: consented to blood products            Reason for refusal: other.     Postoperative Care    Pain management: IV analgesics, Oral pain medications, Multi-modal analgesia.   PONV prophylaxis: Background Propofol Infusion, Ondansetron (or other 5HT-3)     Comments:    Other Comments: She has a hx of an atrial myxoma that was operated on and complicated by damge to the tricuspid valve requiring a replacement in 2001 and revision in 2016. Also at that time, her course was complicated by a third degree AVblock requiring a pace maker. She is now scheduled for a repeat sternotomy with tricuspid valve repair with Dr Nowak.    Plan for GA + ETT, pre-induction arterial line, central line.            Antonio Cardenas MD

## 2023-07-20 ENCOUNTER — ANCILLARY PROCEDURE (OUTPATIENT)
Dept: CARDIOLOGY | Facility: CLINIC | Age: 28
DRG: 219 | End: 2023-07-20
Attending: INTERNAL MEDICINE
Payer: COMMERCIAL

## 2023-07-20 ENCOUNTER — APPOINTMENT (OUTPATIENT)
Dept: GENERAL RADIOLOGY | Facility: CLINIC | Age: 28
DRG: 219 | End: 2023-07-20
Attending: STUDENT IN AN ORGANIZED HEALTH CARE EDUCATION/TRAINING PROGRAM
Payer: COMMERCIAL

## 2023-07-20 ENCOUNTER — ANESTHESIA (OUTPATIENT)
Dept: SURGERY | Facility: CLINIC | Age: 28
DRG: 219 | End: 2023-07-20
Payer: COMMERCIAL

## 2023-07-20 ENCOUNTER — ANCILLARY PROCEDURE (OUTPATIENT)
Dept: CARDIOLOGY | Facility: CLINIC | Age: 28
DRG: 219 | End: 2023-07-20
Attending: SURGERY
Payer: COMMERCIAL

## 2023-07-20 ENCOUNTER — HOSPITAL ENCOUNTER (INPATIENT)
Facility: CLINIC | Age: 28
LOS: 6 days | Discharge: HOME | DRG: 219 | End: 2023-07-26
Attending: SURGERY | Admitting: INTERNAL MEDICINE
Payer: COMMERCIAL

## 2023-07-20 DIAGNOSIS — T82.857A PROSTHETIC TRICUSPID VALVE STENOSIS: ICD-10-CM

## 2023-07-20 DIAGNOSIS — Z95.2 HISTORY OF TRICUSPID VALVE REPLACEMENT WITH MECHANICAL VALVE: ICD-10-CM

## 2023-07-20 DIAGNOSIS — D50.0 IRON DEFICIENCY ANEMIA DUE TO CHRONIC BLOOD LOSS: ICD-10-CM

## 2023-07-20 DIAGNOSIS — Z95.3 HISTORY OF TRICUSPID VALVE REPLACEMENT WITH BIOPROSTHETIC VALVE: Primary | ICD-10-CM

## 2023-07-20 LAB
ALBUMIN SERPL BCG-MCNC: 3.9 G/DL (ref 3.5–5.2)
ALBUMIN SERPL BCG-MCNC: 4.3 G/DL (ref 3.5–5.2)
ALLEN'S TEST: ABNORMAL
ALLEN'S TEST: ABNORMAL
ALP SERPL-CCNC: 47 U/L (ref 35–104)
ALP SERPL-CCNC: 51 U/L (ref 35–104)
ALT SERPL W P-5'-P-CCNC: 15 U/L (ref 0–50)
ALT SERPL W P-5'-P-CCNC: 16 U/L (ref 0–50)
ANION GAP SERPL CALCULATED.3IONS-SCNC: 11 MMOL/L (ref 7–15)
ANION GAP SERPL CALCULATED.3IONS-SCNC: 11 MMOL/L (ref 7–15)
APTT PPP: 33 SECONDS (ref 22–38)
APTT PPP: 38 SECONDS (ref 22–38)
AST SERPL W P-5'-P-CCNC: 39 U/L (ref 0–45)
AST SERPL W P-5'-P-CCNC: 47 U/L (ref 0–45)
BASE EXCESS BLDA CALC-SCNC: -0.1 MMOL/L (ref -9.6–2)
BASE EXCESS BLDA CALC-SCNC: -0.3 MMOL/L (ref -9–1.8)
BASE EXCESS BLDA CALC-SCNC: -0.4 MMOL/L (ref -9.6–2)
BASE EXCESS BLDA CALC-SCNC: -0.4 MMOL/L (ref -9.6–2)
BASE EXCESS BLDA CALC-SCNC: -1.4 MMOL/L (ref -9.6–2)
BASE EXCESS BLDA CALC-SCNC: -1.5 MMOL/L (ref -9–1.8)
BASE EXCESS BLDA CALC-SCNC: -1.6 MMOL/L (ref -9.6–2)
BASE EXCESS BLDA CALC-SCNC: -2.1 MMOL/L (ref -9.6–2)
BASE EXCESS BLDA CALC-SCNC: -2.6 MMOL/L (ref -9.6–2)
BASE EXCESS BLDA CALC-SCNC: -2.9 MMOL/L (ref -9.6–2)
BASE EXCESS BLDA CALC-SCNC: -3.6 MMOL/L (ref -9.6–2)
BASE EXCESS BLDV CALC-SCNC: 0.2 MMOL/L (ref -8.1–1.9)
BILIRUB SERPL-MCNC: 0.9 MG/DL
BILIRUB SERPL-MCNC: 0.9 MG/DL
BLD PROD TYP BPU: NORMAL
BLOOD COMPONENT TYPE: NORMAL
BUN SERPL-MCNC: 11.7 MG/DL (ref 6–20)
BUN SERPL-MCNC: 12.1 MG/DL (ref 6–20)
CA-I BLD-MCNC: 3.7 MG/DL (ref 4.4–5.2)
CA-I BLD-MCNC: 3.7 MG/DL (ref 4.4–5.2)
CA-I BLD-MCNC: 3.9 MG/DL (ref 4.4–5.2)
CA-I BLD-MCNC: 4 MG/DL (ref 4.4–5.2)
CA-I BLD-MCNC: 4 MG/DL (ref 4.4–5.2)
CA-I BLD-MCNC: 4.3 MG/DL (ref 4.4–5.2)
CA-I BLD-MCNC: 4.5 MG/DL (ref 4.4–5.2)
CA-I BLD-MCNC: 4.7 MG/DL (ref 4.4–5.2)
CA-I BLD-MCNC: 5.1 MG/DL (ref 4.4–5.2)
CA-I BLD-MCNC: 5.2 MG/DL (ref 4.4–5.2)
CA-I BLD-MCNC: 5.6 MG/DL (ref 4.4–5.2)
CALCIUM SERPL-MCNC: 10.5 MG/DL (ref 8.6–10)
CALCIUM SERPL-MCNC: 9.5 MG/DL (ref 8.6–10)
CF REDUC 60M P MA LENFR BLD TEG: 5.3 % (ref 0–15)
CFT BLD TEG: 2.2 MINUTE (ref 1–3)
CHLORIDE SERPL-SCNC: 108 MMOL/L (ref 98–107)
CHLORIDE SERPL-SCNC: 109 MMOL/L (ref 98–107)
CI (COAGULATION INDEX)(Z) NON NATIVE: -0.5 (ref -3–3)
CLOT ANGLE BLD TEG: 61.4 DEGREES (ref 53–72)
CLOT INIT BLD TEG: 6.1 MINUTE (ref 5–10)
CLOT INIT KAOL IND TO POST HEP NEUT TRTO: 1 {RATIO}
CLOT INIT KAOL IND TO POST HEP NEUT TRTO: 1.1 {RATIO}
CLOT INIT KAOLIN IND BLD US: 125 SEC (ref 113–166)
CLOT INIT KAOLIN IND BLD US: 132 SEC (ref 113–166)
CLOT INIT KAOLIN IND BLD US: 132 SEC (ref 113–166)
CLOT INIT KAOLIN IND BLD US: 165 SEC (ref 113–166)
CLOT INIT KAOLIN IND P HEP NEUT BLD US: 126 SEC (ref 103–153)
CLOT INIT KAOLIN IND P HEP NEUT BLD US: 133 SEC (ref 103–153)
CLOT INIT KAOLIN IND P HEP NEUT BLD US: 133 SEC (ref 103–153)
CLOT INIT KAOLIN IND P HEP NEUT BLD US: 154 SEC (ref 103–153)
CLOT LYSIS 30M P MA LENFR BLD TEG: 1.1 % (ref 0–8)
CLOT STIFF PLT CONT BLD CALC: 11 HPA (ref 11.9–29.8)
CLOT STIFF PLT CONT BLD CALC: 11.7 HPA (ref 11.9–29.8)
CLOT STIFF PLT CONT BLD CALC: 11.7 HPA (ref 11.9–29.8)
CLOT STIFF PLT CONT BLD CALC: 12.3 HPA (ref 11.9–29.8)
CLOT STIFF TF IND P HEP NEUT BLD US: 12.7 HPA (ref 13–33.2)
CLOT STIFF TF IND P HEP NEUT BLD US: 13.5 HPA (ref 13–33.2)
CLOT STIFF TF IND P HEP NEUT BLD US: 14.1 HPA (ref 13–33.2)
CLOT STIFF TF IND P HEP NEUT BLD US: 14.2 HPA (ref 13–33.2)
CLOT STIFF TF IND+IIB-IIIA INH P HEP NEU: 1.7 HPA (ref 1–3.7)
CLOT STIFF TF IND+IIB-IIIA INH P HEP NEU: 1.8 HPA (ref 1–3.7)
CLOT STIFF TF IND+IIB-IIIA INH P HEP NEU: 1.9 HPA (ref 1–3.7)
CLOT STIFF TF IND+IIB-IIIA INH P HEP NEU: 2.4 HPA (ref 1–3.7)
CLOT STRENGTH BLD TEG: 7.6 KD/SC (ref 4.5–11)
CODING SYSTEM: NORMAL
CREAT SERPL-MCNC: 0.62 MG/DL (ref 0.51–0.95)
CREAT SERPL-MCNC: 0.67 MG/DL (ref 0.51–0.95)
CROSSMATCH: NORMAL
DEPRECATED HCO3 PLAS-SCNC: 21 MMOL/L (ref 22–29)
DEPRECATED HCO3 PLAS-SCNC: 22 MMOL/L (ref 22–29)
ERYTHROCYTE [DISTWIDTH] IN BLOOD BY AUTOMATED COUNT: 19.2 % (ref 10–15)
ERYTHROCYTE [DISTWIDTH] IN BLOOD BY AUTOMATED COUNT: 20 % (ref 10–15)
ERYTHROCYTE [DISTWIDTH] IN BLOOD BY AUTOMATED COUNT: 20 % (ref 10–15)
FIBRINOGEN PPP-MCNC: 204 MG/DL (ref 170–490)
FIBRINOGEN PPP-MCNC: 227 MG/DL (ref 170–490)
GFR SERPL CREATININE-BSD FRML MDRD: >90 ML/MIN/1.73M2
GFR SERPL CREATININE-BSD FRML MDRD: >90 ML/MIN/1.73M2
GLUCOSE BLD-MCNC: 100 MG/DL (ref 70–99)
GLUCOSE BLD-MCNC: 102 MG/DL (ref 70–99)
GLUCOSE BLD-MCNC: 107 MG/DL (ref 70–99)
GLUCOSE BLD-MCNC: 109 MG/DL (ref 70–99)
GLUCOSE BLD-MCNC: 121 MG/DL (ref 70–99)
GLUCOSE BLD-MCNC: 124 MG/DL (ref 70–99)
GLUCOSE BLD-MCNC: 146 MG/DL (ref 70–99)
GLUCOSE BLD-MCNC: 146 MG/DL (ref 70–99)
GLUCOSE BLD-MCNC: 91 MG/DL (ref 70–99)
GLUCOSE BLD-MCNC: 95 MG/DL (ref 70–99)
GLUCOSE BLDC GLUCOMTR-MCNC: 101 MG/DL (ref 70–99)
GLUCOSE BLDC GLUCOMTR-MCNC: 143 MG/DL (ref 70–99)
GLUCOSE SERPL-MCNC: 103 MG/DL (ref 70–99)
GLUCOSE SERPL-MCNC: 144 MG/DL (ref 70–99)
HCO3 BLD-SCNC: 24 MMOL/L (ref 21–28)
HCO3 BLD-SCNC: 24 MMOL/L (ref 21–28)
HCO3 BLDA-SCNC: 21 MMOL/L (ref 21–28)
HCO3 BLDA-SCNC: 22 MMOL/L (ref 21–28)
HCO3 BLDA-SCNC: 22 MMOL/L (ref 21–28)
HCO3 BLDA-SCNC: 23 MMOL/L (ref 21–28)
HCO3 BLDA-SCNC: 23 MMOL/L (ref 21–28)
HCO3 BLDA-SCNC: 24 MMOL/L (ref 21–28)
HCO3 BLDA-SCNC: 25 MMOL/L (ref 21–28)
HCO3 BLDV-SCNC: 25 MMOL/L (ref 21–28)
HCT VFR BLD AUTO: 25.2 % (ref 35–47)
HCT VFR BLD AUTO: 27.7 % (ref 35–47)
HCT VFR BLD AUTO: 28.8 % (ref 35–47)
HGB BLD-MCNC: 7.3 G/DL (ref 11.7–15.7)
HGB BLD-MCNC: 7.5 G/DL (ref 11.7–15.7)
HGB BLD-MCNC: 7.6 G/DL (ref 11.7–15.7)
HGB BLD-MCNC: 7.6 G/DL (ref 11.7–15.7)
HGB BLD-MCNC: 7.9 G/DL (ref 11.7–15.7)
HGB BLD-MCNC: 8.2 G/DL (ref 11.7–15.7)
HGB BLD-MCNC: 8.3 G/DL (ref 11.7–15.7)
HGB BLD-MCNC: 8.4 G/DL (ref 11.7–15.7)
HGB BLD-MCNC: 8.4 G/DL (ref 11.7–15.7)
HGB BLD-MCNC: 8.6 G/DL (ref 11.7–15.7)
HGB BLD-MCNC: 8.7 G/DL (ref 11.7–15.7)
HGB BLD-MCNC: 9.9 G/DL (ref 11.7–15.7)
HGB BLD-MCNC: 9.9 G/DL (ref 11.7–15.7)
INR PPP: 1.26 (ref 0.85–1.15)
INR PPP: 1.69 (ref 0.85–1.15)
INR PPP: 1.7 (ref 0.85–1.15)
ISSUE DATE AND TIME: NORMAL
LACTATE BLD-SCNC: 0.6 MMOL/L
LACTATE BLD-SCNC: 0.7 MMOL/L
LACTATE BLD-SCNC: 0.8 MMOL/L
LACTATE BLD-SCNC: 0.8 MMOL/L
LACTATE BLD-SCNC: 1.4 MMOL/L
LACTATE BLD-SCNC: 1.6 MMOL/L
LACTATE BLD-SCNC: 1.8 MMOL/L
LACTATE BLD-SCNC: 1.8 MMOL/L
LACTATE SERPL-SCNC: 0.9 MMOL/L (ref 0.7–2)
LACTATE SERPL-SCNC: 1.4 MMOL/L (ref 0.7–2)
MAGNESIUM SERPL-MCNC: 1.7 MG/DL (ref 1.7–2.3)
MAGNESIUM SERPL-MCNC: 2.1 MG/DL (ref 1.7–2.3)
MCF BLD TEG: 60.3 MM (ref 50–70)
MCH RBC QN AUTO: 24.4 PG (ref 26.5–33)
MCH RBC QN AUTO: 24.5 PG (ref 26.5–33)
MCH RBC QN AUTO: 24.6 PG (ref 26.5–33)
MCHC RBC AUTO-ENTMCNC: 29.2 G/DL (ref 31.5–36.5)
MCHC RBC AUTO-ENTMCNC: 29.6 G/DL (ref 31.5–36.5)
MCHC RBC AUTO-ENTMCNC: 29.8 G/DL (ref 31.5–36.5)
MCV RBC AUTO: 82 FL (ref 78–100)
MCV RBC AUTO: 83 FL (ref 78–100)
MCV RBC AUTO: 84 FL (ref 78–100)
MDC_IDC_LEAD_IMPLANT_DT: NORMAL
MDC_IDC_LEAD_LOCATION: NORMAL
MDC_IDC_LEAD_LOCATION_DETAIL_1: NORMAL
MDC_IDC_LEAD_MFG: NORMAL
MDC_IDC_LEAD_MODEL: NORMAL
MDC_IDC_LEAD_POLARITY_TYPE: NORMAL
MDC_IDC_LEAD_SERIAL: NORMAL
MDC_IDC_MSMT_BATTERY_DTM: NORMAL
MDC_IDC_MSMT_BATTERY_REMAINING_LONGEVITY: 32 MO
MDC_IDC_MSMT_BATTERY_STATUS: NORMAL
MDC_IDC_MSMT_BATTERY_VOLTAGE: 3.01 V
MDC_IDC_MSMT_LEADCHNL_RV_IMPEDANCE_VALUE: 230 OHM
MDC_IDC_MSMT_LEADCHNL_RV_PACING_THRESHOLD_AMPLITUDE: 1.75 V
MDC_IDC_MSMT_LEADCHNL_RV_PACING_THRESHOLD_PULSEWIDTH: 1 MS
MDC_IDC_PG_IMPLANT_DTM: NORMAL
MDC_IDC_PG_MFG: NORMAL
MDC_IDC_PG_MODEL: NORMAL
MDC_IDC_PG_SERIAL: NORMAL
MDC_IDC_PG_TYPE: NORMAL
MDC_IDC_SESS_CLINIC_NAME: NORMAL
MDC_IDC_SESS_DTM: NORMAL
MDC_IDC_SESS_TYPE: NORMAL
MDC_IDC_SET_BRADY_LOWRATE: 80 {BEATS}/MIN
MDC_IDC_SET_BRADY_MODE: NORMAL
MDC_IDC_SET_BRADY_NIGHT_RATE: NORMAL
MDC_IDC_SET_LEADCHNL_RV_PACING_AMPLITUDE: 3.5 V
MDC_IDC_SET_LEADCHNL_RV_PACING_POLARITY: NORMAL
MDC_IDC_SET_LEADCHNL_RV_PACING_PULSEWIDTH: 1 MS
MDC_IDC_SET_LEADCHNL_RV_SENSING_POLARITY: NORMAL
MDC_IDC_STAT_BRADY_RV_PERCENT_PACED: 99.4 %
MRSA DNA SPEC QL NAA+PROBE: NEGATIVE
O2/TOTAL GAS SETTING VFR VENT: 100 %
O2/TOTAL GAS SETTING VFR VENT: 40 %
O2/TOTAL GAS SETTING VFR VENT: 50 %
O2/TOTAL GAS SETTING VFR VENT: 60 %
O2/TOTAL GAS SETTING VFR VENT: 70 %
O2/TOTAL GAS SETTING VFR VENT: 80 %
OXYHGB MFR BLD: 98 % (ref 92–100)
OXYHGB MFR BLD: 98 % (ref 92–100)
PCO2 BLD: 34 MM HG (ref 35–45)
PCO2 BLD: 42 MM HG (ref 35–45)
PCO2 BLDA: 36 MM HG (ref 35–45)
PCO2 BLDA: 36 MM HG (ref 35–45)
PCO2 BLDA: 37 MM HG (ref 35–45)
PCO2 BLDA: 37 MM HG (ref 35–45)
PCO2 BLDA: 38 MM HG (ref 35–45)
PCO2 BLDA: 40 MM HG (ref 35–45)
PCO2 BLDA: 40 MM HG (ref 35–45)
PCO2 BLDA: 41 MM HG (ref 35–45)
PCO2 BLDA: 44 MM HG (ref 35–45)
PCO2 BLDV: 43 MM HG (ref 40–50)
PH BLD: 7.37 [PH] (ref 7.35–7.45)
PH BLD: 7.45 [PH] (ref 7.35–7.45)
PH BLDA: 7.35 [PH] (ref 7.35–7.45)
PH BLDA: 7.35 [PH] (ref 7.35–7.45)
PH BLDA: 7.37 [PH] (ref 7.35–7.45)
PH BLDA: 7.38 [PH] (ref 7.35–7.45)
PH BLDA: 7.4 [PH] (ref 7.35–7.45)
PH BLDA: 7.41 [PH] (ref 7.35–7.45)
PH BLDA: 7.43 [PH] (ref 7.35–7.45)
PH BLDV: 7.38 [PH] (ref 7.32–7.43)
PHOSPHATE SERPL-MCNC: 3.8 MG/DL (ref 2.5–4.5)
PHOSPHATE SERPL-MCNC: 4.7 MG/DL (ref 2.5–4.5)
PLATELET # BLD AUTO: 103 10E3/UL (ref 150–450)
PLATELET # BLD AUTO: 123 10E3/UL (ref 150–450)
PLATELET # BLD AUTO: 97 10E3/UL (ref 150–450)
PO2 BLD: 155 MM HG (ref 80–105)
PO2 BLD: 199 MM HG (ref 80–105)
PO2 BLDA: 227 MM HG (ref 80–105)
PO2 BLDA: 227 MM HG (ref 80–105)
PO2 BLDA: 252 MM HG (ref 80–105)
PO2 BLDA: 253 MM HG (ref 80–105)
PO2 BLDA: 290 MM HG (ref 80–105)
PO2 BLDA: 322 MM HG (ref 80–105)
PO2 BLDA: 341 MM HG (ref 80–105)
PO2 BLDA: 432 MM HG (ref 80–105)
PO2 BLDA: 490 MM HG (ref 80–105)
PO2 BLDV: 47 MM HG (ref 25–47)
POTASSIUM BLD-SCNC: 2.9 MMOL/L (ref 3.5–5)
POTASSIUM BLD-SCNC: 2.9 MMOL/L (ref 3.5–5)
POTASSIUM BLD-SCNC: 3.2 MMOL/L (ref 3.5–5)
POTASSIUM BLD-SCNC: 3.2 MMOL/L (ref 3.5–5)
POTASSIUM BLD-SCNC: 3.3 MMOL/L (ref 3.5–5)
POTASSIUM BLD-SCNC: 3.4 MMOL/L (ref 3.5–5)
POTASSIUM BLD-SCNC: 3.4 MMOL/L (ref 3.5–5)
POTASSIUM BLD-SCNC: 3.5 MMOL/L (ref 3.5–5)
POTASSIUM BLD-SCNC: 3.5 MMOL/L (ref 3.5–5)
POTASSIUM BLD-SCNC: 3.7 MMOL/L (ref 3.5–5)
POTASSIUM SERPL-SCNC: 4 MMOL/L (ref 3.4–5.3)
POTASSIUM SERPL-SCNC: 4.3 MMOL/L (ref 3.4–5.3)
POTASSIUM SERPL-SCNC: 4.3 MMOL/L (ref 3.4–5.3)
PROT SERPL-MCNC: 5.6 G/DL (ref 6.4–8.3)
PROT SERPL-MCNC: 6.5 G/DL (ref 6.4–8.3)
RBC # BLD AUTO: 3.08 10E6/UL (ref 3.8–5.2)
RBC # BLD AUTO: 3.35 10E6/UL (ref 3.8–5.2)
RBC # BLD AUTO: 3.42 10E6/UL (ref 3.8–5.2)
SA TARGET DNA: NEGATIVE
SODIUM BLD-SCNC: 138 MMOL/L (ref 133–144)
SODIUM BLD-SCNC: 138 MMOL/L (ref 133–144)
SODIUM BLD-SCNC: 139 MMOL/L (ref 133–144)
SODIUM BLD-SCNC: 139 MMOL/L (ref 133–144)
SODIUM BLD-SCNC: 140 MMOL/L (ref 133–144)
SODIUM BLD-SCNC: 142 MMOL/L (ref 133–144)
SODIUM SERPL-SCNC: 141 MMOL/L (ref 136–145)
SODIUM SERPL-SCNC: 141 MMOL/L (ref 136–145)
UNIT ABO/RH: NORMAL
UNIT NUMBER: NORMAL
UNIT STATUS: NORMAL
UNIT TYPE ISBT: 5100
UNIT TYPE ISBT: 6200
UNIT TYPE ISBT: 7300
WBC # BLD AUTO: 14.9 10E3/UL (ref 4–11)
WBC # BLD AUTO: 5.6 10E3/UL (ref 4–11)
WBC # BLD AUTO: 8.6 10E3/UL (ref 4–11)

## 2023-07-20 PROCEDURE — 83605 ASSAY OF LACTIC ACID: CPT | Performed by: STUDENT IN AN ORGANIZED HEALTH CARE EDUCATION/TRAINING PROGRAM

## 2023-07-20 PROCEDURE — 82330 ASSAY OF CALCIUM: CPT | Performed by: STUDENT IN AN ORGANIZED HEALTH CARE EDUCATION/TRAINING PROGRAM

## 2023-07-20 PROCEDURE — 250N000009 HC RX 250: Performed by: STUDENT IN AN ORGANIZED HEALTH CARE EDUCATION/TRAINING PROGRAM

## 2023-07-20 PROCEDURE — 02HN0JZ INSERTION OF PACEMAKER LEAD INTO PERICARDIUM, OPEN APPROACH: ICD-10-PCS | Performed by: SURGERY

## 2023-07-20 PROCEDURE — 250N000013 HC RX MED GY IP 250 OP 250 PS 637: Performed by: STUDENT IN AN ORGANIZED HEALTH CARE EDUCATION/TRAINING PROGRAM

## 2023-07-20 PROCEDURE — 82805 BLOOD GASES W/O2 SATURATION: CPT | Performed by: STUDENT IN AN ORGANIZED HEALTH CARE EDUCATION/TRAINING PROGRAM

## 2023-07-20 PROCEDURE — 84100 ASSAY OF PHOSPHORUS: CPT | Performed by: STUDENT IN AN ORGANIZED HEALTH CARE EDUCATION/TRAINING PROGRAM

## 2023-07-20 PROCEDURE — 88300 SURGICAL PATH GROSS: CPT | Mod: 26 | Performed by: PATHOLOGY

## 2023-07-20 PROCEDURE — 36415 COLL VENOUS BLD VENIPUNCTURE: CPT | Performed by: SURGERY

## 2023-07-20 PROCEDURE — 33530 CORONARY ARTERY BYPASS/REOP: CPT | Performed by: SURGERY

## 2023-07-20 PROCEDURE — 82803 BLOOD GASES ANY COMBINATION: CPT

## 2023-07-20 PROCEDURE — 88300 SURGICAL PATH GROSS: CPT | Mod: TC | Performed by: SURGERY

## 2023-07-20 PROCEDURE — 410N000004: Performed by: SURGERY

## 2023-07-20 PROCEDURE — 272N000002 HC OR SUPPLY OTHER OPNP: Performed by: SURGERY

## 2023-07-20 PROCEDURE — 85610 PROTHROMBIN TIME: CPT | Performed by: STUDENT IN AN ORGANIZED HEALTH CARE EDUCATION/TRAINING PROGRAM

## 2023-07-20 PROCEDURE — 250N000011 HC RX IP 250 OP 636: Performed by: SURGERY

## 2023-07-20 PROCEDURE — P9016 RBC LEUKOCYTES REDUCED: HCPCS

## 2023-07-20 PROCEDURE — 258N000003 HC RX IP 258 OP 636: Performed by: SURGERY

## 2023-07-20 PROCEDURE — 272N000085 HC PACK CELL SAVER CSP: Performed by: SURGERY

## 2023-07-20 PROCEDURE — 85384 FIBRINOGEN ACTIVITY: CPT | Performed by: STUDENT IN AN ORGANIZED HEALTH CARE EDUCATION/TRAINING PROGRAM

## 2023-07-20 PROCEDURE — 83735 ASSAY OF MAGNESIUM: CPT | Performed by: STUDENT IN AN ORGANIZED HEALTH CARE EDUCATION/TRAINING PROGRAM

## 2023-07-20 PROCEDURE — 83605 ASSAY OF LACTIC ACID: CPT | Performed by: SURGERY

## 2023-07-20 PROCEDURE — 250N000011 HC RX IP 250 OP 636: Performed by: STUDENT IN AN ORGANIZED HEALTH CARE EDUCATION/TRAINING PROGRAM

## 2023-07-20 PROCEDURE — 250N000024 HC ISOFLURANE, PER MIN: Performed by: SURGERY

## 2023-07-20 PROCEDURE — 33465 REPLACE TRICUSPID VALVE: CPT | Performed by: SURGERY

## 2023-07-20 PROCEDURE — 02RJ08Z REPLACEMENT OF TRICUSPID VALVE WITH ZOOPLASTIC TISSUE, OPEN APPROACH: ICD-10-PCS | Performed by: SURGERY

## 2023-07-20 PROCEDURE — 272N000088 HC PUMP APP ADULT PERFUSION: Performed by: SURGERY

## 2023-07-20 PROCEDURE — 410N000003 HC PER-PERFUSION 1ST 30 MIN: Performed by: SURGERY

## 2023-07-20 PROCEDURE — 999N000065 XR CHEST PORT 1 VIEW

## 2023-07-20 PROCEDURE — 85384 FIBRINOGEN ACTIVITY: CPT | Performed by: SURGERY

## 2023-07-20 PROCEDURE — 85610 PROTHROMBIN TIME: CPT | Performed by: SURGERY

## 2023-07-20 PROCEDURE — 86923 COMPATIBILITY TEST ELECTRIC: CPT | Performed by: STUDENT IN AN ORGANIZED HEALTH CARE EDUCATION/TRAINING PROGRAM

## 2023-07-20 PROCEDURE — 250N000011 HC RX IP 250 OP 636: Mod: JZ | Performed by: STUDENT IN AN ORGANIZED HEALTH CARE EDUCATION/TRAINING PROGRAM

## 2023-07-20 PROCEDURE — 87641 MR-STAPH DNA AMP PROBE: CPT | Performed by: SURGERY

## 2023-07-20 PROCEDURE — 5A1221Z PERFORMANCE OF CARDIAC OUTPUT, CONTINUOUS: ICD-10-PCS | Performed by: SURGERY

## 2023-07-20 PROCEDURE — P9059 PLASMA, FRZ BETWEEN 8-24HOUR: HCPCS | Performed by: STUDENT IN AN ORGANIZED HEALTH CARE EDUCATION/TRAINING PROGRAM

## 2023-07-20 PROCEDURE — 84132 ASSAY OF SERUM POTASSIUM: CPT | Performed by: STUDENT IN AN ORGANIZED HEALTH CARE EDUCATION/TRAINING PROGRAM

## 2023-07-20 PROCEDURE — P9037 PLATE PHERES LEUKOREDU IRRAD: HCPCS

## 2023-07-20 PROCEDURE — 999N000141 HC STATISTIC PRE-PROCEDURE NURSING ASSESSMENT: Performed by: SURGERY

## 2023-07-20 PROCEDURE — 370N000017 HC ANESTHESIA TECHNICAL FEE, PER MIN: Performed by: SURGERY

## 2023-07-20 PROCEDURE — 999N000157 HC STATISTIC RCP TIME EA 10 MIN

## 2023-07-20 PROCEDURE — 93005 ELECTROCARDIOGRAM TRACING: CPT

## 2023-07-20 PROCEDURE — C1898 LEAD, PMKR, OTHER THAN TRANS: HCPCS | Performed by: SURGERY

## 2023-07-20 PROCEDURE — 85027 COMPLETE CBC AUTOMATED: CPT | Performed by: STUDENT IN AN ORGANIZED HEALTH CARE EDUCATION/TRAINING PROGRAM

## 2023-07-20 PROCEDURE — 258N000003 HC RX IP 258 OP 636: Performed by: STUDENT IN AN ORGANIZED HEALTH CARE EDUCATION/TRAINING PROGRAM

## 2023-07-20 PROCEDURE — 999N000253 HC STATISTIC WEANING TRIALS

## 2023-07-20 PROCEDURE — 85396 CLOTTING ASSAY WHOLE BLOOD: CPT | Performed by: SURGERY

## 2023-07-20 PROCEDURE — C1889 IMPLANT/INSERT DEVICE, NOC: HCPCS | Performed by: SURGERY

## 2023-07-20 PROCEDURE — 360N000079 HC SURGERY LEVEL 6, PER MIN: Performed by: SURGERY

## 2023-07-20 PROCEDURE — 99291 CRITICAL CARE FIRST HOUR: CPT | Mod: 25 | Performed by: INTERNAL MEDICINE

## 2023-07-20 PROCEDURE — 272N000001 HC OR GENERAL SUPPLY STERILE: Performed by: SURGERY

## 2023-07-20 PROCEDURE — 86923 COMPATIBILITY TEST ELECTRIC: CPT

## 2023-07-20 PROCEDURE — 84450 TRANSFERASE (AST) (SGOT): CPT | Performed by: STUDENT IN AN ORGANIZED HEALTH CARE EDUCATION/TRAINING PROGRAM

## 2023-07-20 PROCEDURE — 85730 THROMBOPLASTIN TIME PARTIAL: CPT | Performed by: SURGERY

## 2023-07-20 PROCEDURE — 85730 THROMBOPLASTIN TIME PARTIAL: CPT | Performed by: STUDENT IN AN ORGANIZED HEALTH CARE EDUCATION/TRAINING PROGRAM

## 2023-07-20 PROCEDURE — 250N000009 HC RX 250: Performed by: SURGERY

## 2023-07-20 PROCEDURE — 250N000025 HC SEVOFLURANE, PER MIN: Performed by: SURGERY

## 2023-07-20 PROCEDURE — 71045 X-RAY EXAM CHEST 1 VIEW: CPT | Mod: 26 | Performed by: RADIOLOGY

## 2023-07-20 PROCEDURE — 999N000259 HC STATISTIC EXTUBATION

## 2023-07-20 PROCEDURE — 250N000013 HC RX MED GY IP 250 OP 250 PS 637: Performed by: SURGERY

## 2023-07-20 PROCEDURE — 93286 PERI-PX EVAL PM/LDLS PM IP: CPT

## 2023-07-20 PROCEDURE — 85396 CLOTTING ASSAY WHOLE BLOOD: CPT

## 2023-07-20 PROCEDURE — 93286 PERI-PX EVAL PM/LDLS PM IP: CPT | Mod: 26 | Performed by: INTERNAL MEDICINE

## 2023-07-20 PROCEDURE — 200N000002 HC R&B ICU UMMC

## 2023-07-20 PROCEDURE — 84155 ASSAY OF PROTEIN SERUM: CPT | Performed by: STUDENT IN AN ORGANIZED HEALTH CARE EDUCATION/TRAINING PROGRAM

## 2023-07-20 PROCEDURE — 94002 VENT MGMT INPAT INIT DAY: CPT

## 2023-07-20 PROCEDURE — P9016 RBC LEUKOCYTES REDUCED: HCPCS | Performed by: STUDENT IN AN ORGANIZED HEALTH CARE EDUCATION/TRAINING PROGRAM

## 2023-07-20 PROCEDURE — 85027 COMPLETE CBC AUTOMATED: CPT | Performed by: SURGERY

## 2023-07-20 DEVICE — LEAD 4968-35 US BIPOLAR EPICARDIAL 35CM
Type: IMPLANTABLE DEVICE | Site: HEART | Status: FUNCTIONAL
Brand: CAPSURE® EPI

## 2023-07-20 DEVICE — VALVE MITRAL EPIC STENTED PORCINE 29MM E100-29M-00: Type: IMPLANTABLE DEVICE | Site: HEART | Status: FUNCTIONAL

## 2023-07-20 RX ORDER — PANTOPRAZOLE SODIUM 40 MG/1
40 TABLET, DELAYED RELEASE ORAL DAILY
Status: DISCONTINUED | OUTPATIENT
Start: 2023-07-20 | End: 2023-07-26 | Stop reason: HOSPADM

## 2023-07-20 RX ORDER — CEFAZOLIN SODIUM/WATER 2 G/20 ML
2 SYRINGE (ML) INTRAVENOUS SEE ADMIN INSTRUCTIONS
Status: DISCONTINUED | OUTPATIENT
Start: 2023-07-20 | End: 2023-07-20 | Stop reason: HOSPADM

## 2023-07-20 RX ORDER — CEFAZOLIN SODIUM/WATER 2 G/20 ML
2 SYRINGE (ML) INTRAVENOUS
Status: COMPLETED | OUTPATIENT
Start: 2023-07-20 | End: 2023-07-20

## 2023-07-20 RX ORDER — GABAPENTIN 300 MG/1
300 CAPSULE ORAL
Status: COMPLETED | OUTPATIENT
Start: 2023-07-20 | End: 2023-07-20

## 2023-07-20 RX ORDER — NITROGLYCERIN 20 MG/100ML
10-200 INJECTION INTRAVENOUS CONTINUOUS
Status: DISCONTINUED | OUTPATIENT
Start: 2023-07-20 | End: 2023-07-21

## 2023-07-20 RX ORDER — HYDROMORPHONE HCL IN WATER/PF 6 MG/30 ML
0.2 PATIENT CONTROLLED ANALGESIA SYRINGE INTRAVENOUS
Status: DISCONTINUED | OUTPATIENT
Start: 2023-07-20 | End: 2023-07-25

## 2023-07-20 RX ORDER — SODIUM CHLORIDE, SODIUM GLUCONATE, SODIUM ACETATE, POTASSIUM CHLORIDE AND MAGNESIUM CHLORIDE 526; 502; 368; 37; 30 MG/100ML; MG/100ML; MG/100ML; MG/100ML; MG/100ML
INJECTION, SOLUTION INTRAVENOUS CONTINUOUS PRN
Status: DISCONTINUED | OUTPATIENT
Start: 2023-07-20 | End: 2023-07-20

## 2023-07-20 RX ORDER — ONDANSETRON 4 MG/1
4 TABLET, ORALLY DISINTEGRATING ORAL EVERY 6 HOURS PRN
Status: DISCONTINUED | OUTPATIENT
Start: 2023-07-20 | End: 2023-07-26 | Stop reason: HOSPADM

## 2023-07-20 RX ORDER — FIBRINOGEN (HUMAN) 700-1300MG
1100 KIT INTRAVENOUS
Status: DISCONTINUED | OUTPATIENT
Start: 2023-07-20 | End: 2023-07-21

## 2023-07-20 RX ORDER — POLYETHYLENE GLYCOL 3350 17 G/17G
17 POWDER, FOR SOLUTION ORAL DAILY
Status: DISCONTINUED | OUTPATIENT
Start: 2023-07-21 | End: 2023-07-25

## 2023-07-20 RX ORDER — NOREPINEPHRINE BITARTRATE 0.06 MG/ML
.01-.1 INJECTION, SOLUTION INTRAVENOUS CONTINUOUS
Status: DISCONTINUED | OUTPATIENT
Start: 2023-07-20 | End: 2023-07-20 | Stop reason: HOSPADM

## 2023-07-20 RX ORDER — VANCOMYCIN HYDROCHLORIDE 1 G/200ML
1000 INJECTION, SOLUTION INTRAVENOUS
Status: COMPLETED | OUTPATIENT
Start: 2023-07-20 | End: 2023-07-20

## 2023-07-20 RX ORDER — DEXTROSE MONOHYDRATE 25 G/50ML
25-50 INJECTION, SOLUTION INTRAVENOUS
Status: DISCONTINUED | OUTPATIENT
Start: 2023-07-20 | End: 2023-07-26 | Stop reason: HOSPADM

## 2023-07-20 RX ORDER — GABAPENTIN 100 MG/1
100 CAPSULE ORAL 3 TIMES DAILY
Status: DISCONTINUED | OUTPATIENT
Start: 2023-07-20 | End: 2023-07-23

## 2023-07-20 RX ORDER — NOREPINEPHRINE BITARTRATE 0.06 MG/ML
.01-.4 INJECTION, SOLUTION INTRAVENOUS CONTINUOUS
Status: DISCONTINUED | OUTPATIENT
Start: 2023-07-20 | End: 2023-07-21

## 2023-07-20 RX ORDER — ACETAMINOPHEN 325 MG/1
650 TABLET ORAL EVERY 4 HOURS PRN
Status: DISCONTINUED | OUTPATIENT
Start: 2023-07-23 | End: 2023-07-23

## 2023-07-20 RX ORDER — NICOTINE POLACRILEX 4 MG
15-30 LOZENGE BUCCAL
Status: DISCONTINUED | OUTPATIENT
Start: 2023-07-20 | End: 2023-07-20

## 2023-07-20 RX ORDER — ONDANSETRON 2 MG/ML
4 INJECTION INTRAMUSCULAR; INTRAVENOUS EVERY 6 HOURS PRN
Status: DISCONTINUED | OUTPATIENT
Start: 2023-07-20 | End: 2023-07-26 | Stop reason: HOSPADM

## 2023-07-20 RX ORDER — CEFAZOLIN SODIUM 2 G/100ML
2 INJECTION, SOLUTION INTRAVENOUS EVERY 8 HOURS
Status: COMPLETED | OUTPATIENT
Start: 2023-07-20 | End: 2023-07-21

## 2023-07-20 RX ORDER — NICOTINE POLACRILEX 4 MG
15-30 LOZENGE BUCCAL
Status: DISCONTINUED | OUTPATIENT
Start: 2023-07-20 | End: 2023-07-26 | Stop reason: HOSPADM

## 2023-07-20 RX ORDER — BISACODYL 10 MG
10 SUPPOSITORY, RECTAL RECTAL DAILY PRN
Status: DISCONTINUED | OUTPATIENT
Start: 2023-07-20 | End: 2023-07-26 | Stop reason: HOSPADM

## 2023-07-20 RX ORDER — VASOPRESSIN IN 0.9 % NACL 2 UNIT/2ML
SYRINGE (ML) INTRAVENOUS PRN
Status: DISCONTINUED | OUTPATIENT
Start: 2023-07-20 | End: 2023-07-20

## 2023-07-20 RX ORDER — NALOXONE HYDROCHLORIDE 0.4 MG/ML
0.4 INJECTION, SOLUTION INTRAMUSCULAR; INTRAVENOUS; SUBCUTANEOUS
Status: DISCONTINUED | OUTPATIENT
Start: 2023-07-20 | End: 2023-07-26 | Stop reason: HOSPADM

## 2023-07-20 RX ORDER — HYDROMORPHONE HCL IN WATER/PF 6 MG/30 ML
0.4 PATIENT CONTROLLED ANALGESIA SYRINGE INTRAVENOUS
Status: DISCONTINUED | OUTPATIENT
Start: 2023-07-20 | End: 2023-07-25

## 2023-07-20 RX ORDER — ACETAMINOPHEN 325 MG/1
975 TABLET ORAL ONCE
Status: COMPLETED | OUTPATIENT
Start: 2023-07-20 | End: 2023-07-20

## 2023-07-20 RX ORDER — CHLORHEXIDINE GLUCONATE ORAL RINSE 1.2 MG/ML
10 SOLUTION DENTAL ONCE
Status: DISCONTINUED | OUTPATIENT
Start: 2023-07-20 | End: 2023-07-20 | Stop reason: HOSPADM

## 2023-07-20 RX ORDER — NITROGLYCERIN 20 MG/100ML
10-200 INJECTION INTRAVENOUS CONTINUOUS PRN
Status: DISCONTINUED | OUTPATIENT
Start: 2023-07-20 | End: 2023-07-20

## 2023-07-20 RX ORDER — NOREPINEPHRINE BITARTRATE 0.06 MG/ML
.01-.6 INJECTION, SOLUTION INTRAVENOUS CONTINUOUS
Status: DISCONTINUED | OUTPATIENT
Start: 2023-07-20 | End: 2023-07-20

## 2023-07-20 RX ORDER — HEPARIN SODIUM 1000 [USP'U]/ML
INJECTION, SOLUTION INTRAVENOUS; SUBCUTANEOUS PRN
Status: DISCONTINUED | OUTPATIENT
Start: 2023-07-20 | End: 2023-07-20

## 2023-07-20 RX ORDER — OXYCODONE HYDROCHLORIDE 5 MG/1
5 TABLET ORAL EVERY 4 HOURS PRN
Status: DISCONTINUED | OUTPATIENT
Start: 2023-07-20 | End: 2023-07-25

## 2023-07-20 RX ORDER — PHENYLEPHRINE HCL IN 0.9% NACL 50MG/250ML
.1-6 PLASTIC BAG, INJECTION (ML) INTRAVENOUS CONTINUOUS
Status: DISCONTINUED | OUTPATIENT
Start: 2023-07-20 | End: 2023-07-20 | Stop reason: HOSPADM

## 2023-07-20 RX ORDER — LIDOCAINE 40 MG/G
CREAM TOPICAL
Status: DISCONTINUED | OUTPATIENT
Start: 2023-07-20 | End: 2023-07-26 | Stop reason: HOSPADM

## 2023-07-20 RX ORDER — NALOXONE HYDROCHLORIDE 0.4 MG/ML
0.2 INJECTION, SOLUTION INTRAMUSCULAR; INTRAVENOUS; SUBCUTANEOUS
Status: DISCONTINUED | OUTPATIENT
Start: 2023-07-20 | End: 2023-07-26 | Stop reason: HOSPADM

## 2023-07-20 RX ORDER — LIDOCAINE 4 G/G
1 PATCH TOPICAL EVERY 24 HOURS
Status: DISCONTINUED | OUTPATIENT
Start: 2023-07-20 | End: 2023-07-26 | Stop reason: HOSPADM

## 2023-07-20 RX ORDER — MAGNESIUM SULFATE HEPTAHYDRATE 40 MG/ML
2 INJECTION, SOLUTION INTRAVENOUS ONCE
Status: COMPLETED | OUTPATIENT
Start: 2023-07-20 | End: 2023-07-21

## 2023-07-20 RX ORDER — ASPIRIN 81 MG/1
81 TABLET, CHEWABLE ORAL
Status: COMPLETED | OUTPATIENT
Start: 2023-07-20 | End: 2023-07-20

## 2023-07-20 RX ORDER — METHOCARBAMOL 750 MG/1
750 TABLET, FILM COATED ORAL EVERY 6 HOURS PRN
Status: DISCONTINUED | OUTPATIENT
Start: 2023-07-20 | End: 2023-07-25

## 2023-07-20 RX ORDER — FENTANYL CITRATE 50 UG/ML
INJECTION, SOLUTION INTRAMUSCULAR; INTRAVENOUS PRN
Status: DISCONTINUED | OUTPATIENT
Start: 2023-07-20 | End: 2023-07-20

## 2023-07-20 RX ORDER — ACETAMINOPHEN 325 MG/1
975 TABLET ORAL EVERY 8 HOURS
Status: COMPLETED | OUTPATIENT
Start: 2023-07-20 | End: 2023-07-23

## 2023-07-20 RX ORDER — PROTAMINE SULFATE 10 MG/ML
INJECTION, SOLUTION INTRAVENOUS PRN
Status: DISCONTINUED | OUTPATIENT
Start: 2023-07-20 | End: 2023-07-20

## 2023-07-20 RX ORDER — CALCIUM GLUCONATE 20 MG/ML
2 INJECTION, SOLUTION INTRAVENOUS
Status: DISCONTINUED | OUTPATIENT
Start: 2023-07-20 | End: 2023-07-25

## 2023-07-20 RX ORDER — ASPIRIN 81 MG/1
162 TABLET, CHEWABLE ORAL
Status: COMPLETED | OUTPATIENT
Start: 2023-07-20 | End: 2023-07-20

## 2023-07-20 RX ORDER — ONDANSETRON 2 MG/ML
INJECTION INTRAMUSCULAR; INTRAVENOUS PRN
Status: DISCONTINUED | OUTPATIENT
Start: 2023-07-20 | End: 2023-07-20

## 2023-07-20 RX ORDER — PROPOFOL 10 MG/ML
INJECTION, EMULSION INTRAVENOUS CONTINUOUS PRN
Status: DISCONTINUED | OUTPATIENT
Start: 2023-07-20 | End: 2023-07-20

## 2023-07-20 RX ORDER — CALCIUM GLUCONATE 20 MG/ML
1 INJECTION, SOLUTION INTRAVENOUS
Status: DISCONTINUED | OUTPATIENT
Start: 2023-07-20 | End: 2023-07-25

## 2023-07-20 RX ORDER — ASPIRIN 81 MG/1
81 TABLET, CHEWABLE ORAL DAILY
Status: DISCONTINUED | OUTPATIENT
Start: 2023-07-21 | End: 2023-07-23

## 2023-07-20 RX ORDER — PROPOFOL 10 MG/ML
INJECTION, EMULSION INTRAVENOUS PRN
Status: DISCONTINUED | OUTPATIENT
Start: 2023-07-20 | End: 2023-07-20

## 2023-07-20 RX ORDER — AMOXICILLIN 250 MG
1 CAPSULE ORAL 2 TIMES DAILY
Status: DISCONTINUED | OUTPATIENT
Start: 2023-07-20 | End: 2023-07-25

## 2023-07-20 RX ORDER — POTASSIUM CHLORIDE 7.45 MG/ML
10 INJECTION INTRAVENOUS
Status: DISPENSED | OUTPATIENT
Start: 2023-07-20 | End: 2023-07-20

## 2023-07-20 RX ORDER — DEXTROSE MONOHYDRATE 25 G/50ML
25-50 INJECTION, SOLUTION INTRAVENOUS
Status: DISCONTINUED | OUTPATIENT
Start: 2023-07-20 | End: 2023-07-20

## 2023-07-20 RX ORDER — PROPOFOL 10 MG/ML
5-75 INJECTION, EMULSION INTRAVENOUS CONTINUOUS
Status: DISCONTINUED | OUTPATIENT
Start: 2023-07-20 | End: 2023-07-21

## 2023-07-20 RX ORDER — FAMOTIDINE 20 MG/1
20 TABLET, FILM COATED ORAL
Status: COMPLETED | OUTPATIENT
Start: 2023-07-20 | End: 2023-07-20

## 2023-07-20 RX ORDER — DEXMEDETOMIDINE HYDROCHLORIDE 4 UG/ML
.2-1.2 INJECTION, SOLUTION INTRAVENOUS CONTINUOUS
Status: DISCONTINUED | OUTPATIENT
Start: 2023-07-20 | End: 2023-07-20 | Stop reason: HOSPADM

## 2023-07-20 RX ORDER — HYDRALAZINE HYDROCHLORIDE 20 MG/ML
10 INJECTION INTRAMUSCULAR; INTRAVENOUS EVERY 30 MIN PRN
Status: DISCONTINUED | OUTPATIENT
Start: 2023-07-20 | End: 2023-07-26 | Stop reason: HOSPADM

## 2023-07-20 RX ORDER — CALCIUM CHLORIDE 100 MG/ML
INJECTION INTRAVENOUS; INTRAVENTRICULAR PRN
Status: DISCONTINUED | OUTPATIENT
Start: 2023-07-20 | End: 2023-07-20

## 2023-07-20 RX ORDER — NOREPINEPHRINE BITARTRATE 0.06 MG/ML
.01-.4 INJECTION, SOLUTION INTRAVENOUS CONTINUOUS PRN
Status: DISCONTINUED | OUTPATIENT
Start: 2023-07-20 | End: 2023-07-20

## 2023-07-20 RX ORDER — PROCHLORPERAZINE MALEATE 5 MG
10 TABLET ORAL EVERY 6 HOURS PRN
Status: DISCONTINUED | OUTPATIENT
Start: 2023-07-20 | End: 2023-07-26 | Stop reason: HOSPADM

## 2023-07-20 RX ORDER — LIDOCAINE 40 MG/G
CREAM TOPICAL
Status: DISCONTINUED | OUTPATIENT
Start: 2023-07-20 | End: 2023-07-20 | Stop reason: HOSPADM

## 2023-07-20 RX ORDER — HEPARIN SODIUM 5000 [USP'U]/.5ML
5000 INJECTION, SOLUTION INTRAVENOUS; SUBCUTANEOUS EVERY 8 HOURS
Status: DISCONTINUED | OUTPATIENT
Start: 2023-07-21 | End: 2023-07-26 | Stop reason: HOSPADM

## 2023-07-20 RX ORDER — OXYCODONE HYDROCHLORIDE 10 MG/1
10 TABLET ORAL EVERY 4 HOURS PRN
Status: DISCONTINUED | OUTPATIENT
Start: 2023-07-20 | End: 2023-07-25

## 2023-07-20 RX ORDER — DEXMEDETOMIDINE HYDROCHLORIDE 4 UG/ML
.2-.7 INJECTION, SOLUTION INTRAVENOUS CONTINUOUS
Status: DISCONTINUED | OUTPATIENT
Start: 2023-07-20 | End: 2023-07-21

## 2023-07-20 RX ORDER — DEXAMETHASONE SODIUM PHOSPHATE 4 MG/ML
INJECTION, SOLUTION INTRA-ARTICULAR; INTRALESIONAL; INTRAMUSCULAR; INTRAVENOUS; SOFT TISSUE PRN
Status: DISCONTINUED | OUTPATIENT
Start: 2023-07-20 | End: 2023-07-20

## 2023-07-20 RX ADMIN — FAMOTIDINE 20 MG: 20 TABLET, FILM COATED ORAL at 06:06

## 2023-07-20 RX ADMIN — FENTANYL CITRATE 100 MCG: 50 INJECTION, SOLUTION INTRAMUSCULAR; INTRAVENOUS at 10:31

## 2023-07-20 RX ADMIN — CALCIUM CHLORIDE 0.5 G: 100 INJECTION INTRAVENOUS; INTRAVENTRICULAR at 14:39

## 2023-07-20 RX ADMIN — HEPARIN SODIUM 32000 UNITS: 1000 INJECTION INTRAVENOUS; SUBCUTANEOUS at 10:22

## 2023-07-20 RX ADMIN — PROPOFOL 50 MCG/KG/MIN: 10 INJECTION, EMULSION INTRAVENOUS at 16:24

## 2023-07-20 RX ADMIN — MIDAZOLAM 2 MG: 1 INJECTION INTRAMUSCULAR; INTRAVENOUS at 07:50

## 2023-07-20 RX ADMIN — Medication 20 MG: at 12:30

## 2023-07-20 RX ADMIN — Medication 50 MG: at 13:48

## 2023-07-20 RX ADMIN — GABAPENTIN 100 MG: 100 CAPSULE ORAL at 16:00

## 2023-07-20 RX ADMIN — GABAPENTIN 100 MG: 100 CAPSULE ORAL at 19:33

## 2023-07-20 RX ADMIN — FENTANYL CITRATE 250 MCG: 50 INJECTION, SOLUTION INTRAMUSCULAR; INTRAVENOUS at 08:43

## 2023-07-20 RX ADMIN — OXYCODONE HYDROCHLORIDE 10 MG: 10 TABLET ORAL at 19:33

## 2023-07-20 RX ADMIN — FENTANYL CITRATE 150 MCG: 50 INJECTION, SOLUTION INTRAMUSCULAR; INTRAVENOUS at 09:26

## 2023-07-20 RX ADMIN — ONDANSETRON 4 MG: 2 INJECTION INTRAMUSCULAR; INTRAVENOUS at 14:25

## 2023-07-20 RX ADMIN — Medication 50 MG: at 08:21

## 2023-07-20 RX ADMIN — CEFAZOLIN SODIUM 2 G: 2 INJECTION, SOLUTION INTRAVENOUS at 19:32

## 2023-07-20 RX ADMIN — PROTAMINE SULFATE 130 MG: 10 INJECTION, SOLUTION INTRAVENOUS at 12:58

## 2023-07-20 RX ADMIN — Medication 7.5 G: at 08:07

## 2023-07-20 RX ADMIN — Medication 1.5 UNITS: at 09:35

## 2023-07-20 RX ADMIN — PROTAMINE SULFATE 50 MG: 10 INJECTION, SOLUTION INTRAVENOUS at 13:55

## 2023-07-20 RX ADMIN — FENTANYL CITRATE 250 MCG: 50 INJECTION, SOLUTION INTRAMUSCULAR; INTRAVENOUS at 07:50

## 2023-07-20 RX ADMIN — SODIUM CHLORIDE, SODIUM GLUCONATE, SODIUM ACETATE, POTASSIUM CHLORIDE AND MAGNESIUM CHLORIDE: 526; 502; 368; 37; 30 INJECTION, SOLUTION INTRAVENOUS at 10:39

## 2023-07-20 RX ADMIN — NOREPINEPHRINE BITARTRATE 6.4 MCG: 1 INJECTION, SOLUTION, CONCENTRATE INTRAVENOUS at 08:35

## 2023-07-20 RX ADMIN — ONDANSETRON 4 MG: 4 TABLET, ORALLY DISINTEGRATING ORAL at 19:33

## 2023-07-20 RX ADMIN — Medication 40 MG: at 16:16

## 2023-07-20 RX ADMIN — OXYCODONE HYDROCHLORIDE 10 MG: 10 TABLET ORAL at 16:17

## 2023-07-20 RX ADMIN — NOREPINEPHRINE BITARTRATE 12.8 MCG: 1 INJECTION, SOLUTION, CONCENTRATE INTRAVENOUS at 10:44

## 2023-07-20 RX ADMIN — NOREPINEPHRINE BITARTRATE 3.2 MCG: 1 INJECTION, SOLUTION, CONCENTRATE INTRAVENOUS at 08:40

## 2023-07-20 RX ADMIN — SENNOSIDES AND DOCUSATE SODIUM 1 TABLET: 50; 8.6 TABLET ORAL at 19:32

## 2023-07-20 RX ADMIN — FENTANYL CITRATE 150 MCG: 50 INJECTION, SOLUTION INTRAMUSCULAR; INTRAVENOUS at 14:31

## 2023-07-20 RX ADMIN — SUGAMMADEX 400 MG: 100 INJECTION, SOLUTION INTRAVENOUS at 14:58

## 2023-07-20 RX ADMIN — SODIUM CHLORIDE, SODIUM GLUCONATE, SODIUM ACETATE, POTASSIUM CHLORIDE AND MAGNESIUM CHLORIDE: 526; 502; 368; 37; 30 INJECTION, SOLUTION INTRAVENOUS at 08:07

## 2023-07-20 RX ADMIN — Medication 0.03 MCG/KG/MIN: at 08:07

## 2023-07-20 RX ADMIN — PROPOFOL 50 MG: 10 INJECTION, EMULSION INTRAVENOUS at 12:58

## 2023-07-20 RX ADMIN — PROPOFOL 50 MG: 10 INJECTION, EMULSION INTRAVENOUS at 10:49

## 2023-07-20 RX ADMIN — ASPIRIN 162 MG: 81 TABLET ORAL at 06:06

## 2023-07-20 RX ADMIN — MAGNESIUM SULFATE IN WATER 2 G: 40 INJECTION, SOLUTION INTRAVENOUS at 23:01

## 2023-07-20 RX ADMIN — PROPOFOL 50 MCG/KG/MIN: 10 INJECTION, EMULSION INTRAVENOUS at 13:23

## 2023-07-20 RX ADMIN — Medication 2 G: at 08:16

## 2023-07-20 RX ADMIN — Medication 0.5 UNITS: at 08:54

## 2023-07-20 RX ADMIN — VANCOMYCIN HYDROCHLORIDE 1000 MG: 1 INJECTION, SOLUTION INTRAVENOUS at 06:47

## 2023-07-20 RX ADMIN — FENTANYL CITRATE 100 MCG: 50 INJECTION, SOLUTION INTRAMUSCULAR; INTRAVENOUS at 13:30

## 2023-07-20 RX ADMIN — OXYCODONE HYDROCHLORIDE 10 MG: 10 TABLET ORAL at 23:16

## 2023-07-20 RX ADMIN — Medication 0.5 UNITS: at 13:09

## 2023-07-20 RX ADMIN — Medication 1 UNITS: at 10:46

## 2023-07-20 RX ADMIN — PROPOFOL 50 MG: 10 INJECTION, EMULSION INTRAVENOUS at 07:50

## 2023-07-20 RX ADMIN — DEXAMETHASONE SODIUM PHOSPHATE 4 MG: 4 INJECTION, SOLUTION INTRA-ARTICULAR; INTRALESIONAL; INTRAMUSCULAR; INTRAVENOUS; SOFT TISSUE at 14:25

## 2023-07-20 RX ADMIN — GABAPENTIN 300 MG: 300 CAPSULE ORAL at 06:06

## 2023-07-20 RX ADMIN — Medication 50 MG: at 10:33

## 2023-07-20 RX ADMIN — CALCIUM CHLORIDE 1 G: 100 INJECTION INTRAVENOUS; INTRAVENTRICULAR at 13:37

## 2023-07-20 RX ADMIN — PROPOFOL 50 MCG/KG/MIN: 10 INJECTION, EMULSION INTRAVENOUS at 17:06

## 2023-07-20 RX ADMIN — ACETAMINOPHEN 975 MG: 325 TABLET, FILM COATED ORAL at 16:00

## 2023-07-20 RX ADMIN — Medication 100 MG: at 07:50

## 2023-07-20 RX ADMIN — ACETAMINOPHEN 975 MG: 325 TABLET, FILM COATED ORAL at 23:16

## 2023-07-20 RX ADMIN — SODIUM CHLORIDE, SODIUM GLUCONATE, SODIUM ACETATE, POTASSIUM CHLORIDE AND MAGNESIUM CHLORIDE: 526; 502; 368; 37; 30 INJECTION, SOLUTION INTRAVENOUS at 07:38

## 2023-07-20 RX ADMIN — NOREPINEPHRINE BITARTRATE 6.4 MCG: 1 INJECTION, SOLUTION, CONCENTRATE INTRAVENOUS at 08:47

## 2023-07-20 RX ADMIN — METHOCARBAMOL 750 MG: 750 TABLET ORAL at 19:33

## 2023-07-20 RX ADMIN — LIDOCAINE PATCH 4% 1 PATCH: 40 PATCH TOPICAL at 19:52

## 2023-07-20 RX ADMIN — Medication 50 MG: at 08:12

## 2023-07-20 RX ADMIN — ACETAMINOPHEN 975 MG: 325 TABLET, FILM COATED ORAL at 06:08

## 2023-07-20 RX ADMIN — AMINOCAPROIC ACID 1.25 G/HR: 250 INJECTION, SOLUTION INTRAVENOUS at 09:12

## 2023-07-20 RX ADMIN — Medication 50 MG: at 09:31

## 2023-07-20 RX ADMIN — HYDROMORPHONE HYDROCHLORIDE 0.5 MG: 1 INJECTION, SOLUTION INTRAMUSCULAR; INTRAVENOUS; SUBCUTANEOUS at 14:25

## 2023-07-20 RX ADMIN — Medication 2 G: at 12:15

## 2023-07-20 ASSESSMENT — ACTIVITIES OF DAILY LIVING (ADL)
ADLS_ACUITY_SCORE: 20
ADLS_ACUITY_SCORE: 25
ADLS_ACUITY_SCORE: 24
ADLS_ACUITY_SCORE: 20
ADLS_ACUITY_SCORE: 26
ADLS_ACUITY_SCORE: 20
ADLS_ACUITY_SCORE: 20
ADLS_ACUITY_SCORE: 25
ADLS_ACUITY_SCORE: 20
ADLS_ACUITY_SCORE: 18

## 2023-07-20 NOTE — ANESTHESIA PROCEDURE NOTES
Airway       Patient location during procedure: OR       Procedure Start/Stop Times: 7/20/2023 7:53 AM  Staff -        Anesthesiologist:  Behrens, Christopher J, MD       Resident/Fellow: Antonio Elias MD       Performed By: resident  Consent for Airway        Urgency: elective  Indications and Patient Condition       Indications for airway management: maryjane-procedural       Induction type:intravenous       Mask difficulty assessment: 1 - vent by mask    Final Airway Details       Final airway type: endotracheal airway       Successful airway: ETT - single and Oral  Endotracheal Airway Details        ETT size (mm): 8.0       Cuffed: yes       Successful intubation technique: direct laryngoscopy       DL Blade Type: MAC 4       Grade View of Cords: 1       Adjucts: stylet       Position: Right       Measured from: lips       Secured at (cm): 22       Bite block used: None    Post intubation assessment        Placement verified by: capnometry, equal breath sounds and chest rise        Number of attempts at approach: 1       Number of other approaches attempted: 0       Secured with: pink tape       Ease of procedure: easy       Dentition: Intact and Unchanged    Medication(s) Administered   Medication Administration Time: 7/20/2023 7:53 AM

## 2023-07-20 NOTE — ANESTHESIA PROCEDURE NOTES
Arterial Line Procedure Note    Pre-Procedure   Staff -        Anesthesiologist:  Behrens, Christopher J, MD       Resident/Fellow: Antonio Elias MD       Performed By: resident       Location: OR       Pre-Anesthestic Checklist: patient identified, IV checked, risks and benefits discussed, informed consent, monitors and equipment checked, pre-op evaluation and at physician/surgeon's request  Timeout:       Correct Patient: Yes        Correct Procedure: Yes        Correct Site: Yes        Correct Position: Yes   Line Placement:   This line was placed Pre Induction starting at 7/20/2023 6:50 AM and ending at 7/20/2023 7:05 AM  Procedure   Procedure: arterial line       Diagnosis: hemodynamic monitoring       Laterality: left       Insertion Site: radial.  Sterile Prep        Standard elements of sterile barrier followed       Skin prep: Chloraprep  Insertion/Injection        Technique: ultrasound guided and Seldinger Technique        1. Ultrasound was used to evaluate the access site.       2. Artery evaluated via ultrasound for patency/adequacy.       3. Using real-time ultrasound the needle/catheter was observed entering the artery/vein.       Catheter Type/Size: 20 G, 12 cm  Narrative         Secured by: suture       Tegaderm dressing used.       Complications: None apparent,        Arterial waveform: Yes        IBP within 10% of NIBP: Yes

## 2023-07-20 NOTE — H&P
CV ICU H&P    ASSESSMENT: Silvia Otero is a 27 year old female with PMH of anemia,  hx of an atrial myxoma operated on in [place] c/b damage to the tricuspid valve requiring replacement in 2001 and a revision in 2016. Pacemaker placed in February of 2023 for 3rd degree heart block  Presents to [facility] for redo sternotomy, explantation of mechanical tricuspid valve and replacement with bioprosthetic mitral valve in the tricuspid position by Dr. Nowak. Had permanent pacemaker placed and temporary pacemaker wires which are sewed in and will need to be cut when ready.    Patient presents to the ICU intubated and sedated. Pacer dependent VVIR at 70 reprogrammed to VOO at 80 on warfarin bridged to Lovenox. Easy mask and airway. L rad A line R CVC with no PAC. Mod RV dysfunction 2/2 adhesions, improved to mild reduced when adhesions freed. All other valves fine. LV function good. Patient went off bypass well. During the case she received 1000mcg of fentanyl, 1mg of dilaudid, 2.3L of crystalloid, 2u platelets, 265ml of Cell saver. She had 1L UOP, and was reversed. Patient is fast track status.     CO-MORBIDITIES:   Patient Active Problem List   Diagnosis     Mural thrombus of heart     History of tricuspid valve replacement with mechanical valve     Atrial flutter, unspecified type (H)     Chest pressure     Iron deficiency anemia due to chronic blood loss     Prosthetic tricuspid valve stenosis           PLAN:  Neuro/ pain/ sedation:  - Monitor neurological status. Notify the MD for any acute changes in exam.  #Acute postoperative pain  - Scheduled: Tylenol  - PRN: Tylenol, oxycodone, Dilaudid  #Sedation  - Gtt: propofol    Pulmonary care:   #Mechanical ventilation  Resp: 16     - Goal SpO2 > 92%  - Encourage IS q15-30 minutes when awake.    Cardiovascular:    #Cardiogenic shock  #Hx of atrial myxoma  s/p resection   #Hx of prior Triscuspid revision (2001) and replacement (2016)  #3rd Degree AV block s/p pacemaker  "2023  #s/p redo sternotomy, explantation of mechanical valve and replacement with bioprosthetic tricuspid valve by Dr. Nowak 7/20/22 on warfarin  Pre CPBP:  Post CPBP:  - Not on any pressors immediatly post op  - Goal MAP >65, SBP <140, monitor hemodynamics  - Hold Statin   - Hold BB   - ASA: start tomorrow  - permanent pacers placed and temporary wires placed which are sutured in place and will need to be cut when ready    - Patient is 98% V paced, consult cardiac device nurse for reprogramming      GI care / Nutrition:   - NPO, bedside swallow eval once extubated  - PPI  - Bowel regimen: MiraLAX, senna    Renal / Fluids / Electrolytes:   BL creat appears to be ~ 0.8  - Strict I/O, daily weights, avoid/limit nephrotoxins  - Replete lytes PRN per protocol    Endocrine:    #Stress hyperglycemia  Preop A1c 5.2  - Insulin gtt wean to sliding scale insulin   - Goal BG <180 for optimal healing    ID / Antibiotics:  #Leukopenia   - To complete perioperative regimen  - Monitor fever curve, WBC, and inflammatory markers as appropriate  - WBC 2.9 preoperatively     Heme:     #Acute on Chronic Anemia  #Acute on Chronic Thrombocytopenia   #Hx of Pancytopenia   No s/sx active bleeding  - CBC   - Hgb goal > 7.0  - Hemoglobin 10.5 preoperatively     MSK / Skin:  #Sternotomy  #Surgical Incision  - Sternal precautions, postop incision management protocol  - PT/OT/CR    Prophylaxis:     - Mechanical DVT ppx  - Chemical DVT ppx: start SQH tomorrow  - PPI    Lines / Tubes / Drains:  - ETT  - RIJ CVC, PA catheter  - Arterial Line  - CTs x4  - Iglesias  - NG    Disposition:  - CVICU      Patient seen, findings and plan discussed with CVICU staff and cardiothoracic surgeons and fellow.    Van Bustamante MD  Anesthesiology PGY-3 / CA-2  Ascom *[phone]       ====================================    HPI:   Presents to CVICU intubated and sedated.    From PAC H&P \"Patient is being evaluated for comorbid conditions of complete heart block now with " "[employer] pacemaker present, anemia, chronic anticoagulation.      Ms. Otero recently migrated from [place] last year. 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 Also at that time, her course was complicated by a third degree heart block requiring a pace maker. She is now scheduled for the above procedure.\"      PAST MEDICAL HISTORY:   Past Medical History:   Diagnosis Date     Cardiac pacemaker in situ      Complete heart block (H)      History of atrial myxoma, congenital      Tricuspid regurgitation        PAST SURGICAL HISTORY:   Past Surgical History:   Procedure Laterality Date     ATRIAL MYXOMA EXCISION      in infancy     EP PACEMAKER GENERATOR REPLACEMENT- DUAL N/A 02/24/2023    Procedure: Pacemaker Generator Replacement Dual;  Surgeon: Jordon Regan MD;  Location:  HEART CARDIAC CATH LAB     REPLACE VALVE TRICUSPID  2016       FAMILY HISTORY:   Family History   Problem Relation Age of Onset     Anesthesia Reaction No family hx of      Deep Vein Thrombosis (DVT) No family hx of        SOCIAL HISTORY:   Social History     Tobacco Use     Smoking status: Never     Smokeless tobacco: Never   Substance Use Topics     Alcohol use: Never         OBJECTIVE:  1. VITAL SIGNS:   Temp:  [97.9  F (36.6  C)] 97.9  F (36.6  C)  Pulse:  [70] 70  Resp:  [16] 16  BP: (122)/(80) 122/80  SpO2:  [100 %] 100 %    Resp: 16        2. INTAKE/ OUTPUT:   I/O last 3 completed shifts:  In: 2780 [I.V.:2100; Other:230]  Out: 1140 [Urine:1140]      3. PHYSICAL EXAMINATION:   General: sedated  Neuro: sedated  Resp: intubated, ventilated  CV: S1, S2, RRR, no m/r/g   Abdomen: Soft, non-distended, non-tender  Incisions: c/d/i  Extremities: warm and well perfused  CT: x4 To suction, serosang output, no airleak, crepitus      4. INVESTIGATIONS:   Arterial Blood Gases   Recent Labs   Lab 07/20/23  1409 07/20/23  1334 " 07/20/23  1304 07/20/23  1229   PH 7.41 7.40 7.35 7.35   PCO2 36 37 40 44   PO2 227* 227* 290* 322*   HCO3 23 23 22 24     Complete Blood Count   Recent Labs   Lab 07/20/23  1409 07/20/23  1334 07/20/23  1304 07/20/23  1300 07/20/23  0818 07/15/23  1459   WBC  --   --   --  14.9*  --  2.9*   HGB 7.6* 7.9* 8.6* 8.4*   < > 10.5*   PLT  --   --   --  103*  --  134*    < > = values in this interval not displayed.     Basic Metabolic Panel  Recent Labs   Lab 07/20/23  1519 07/20/23  1409 07/20/23  1334 07/20/23  1304 07/20/23  1229 07/20/23  0818 07/15/23  1459   NA  --  140 140 140 140   < > 141   POTASSIUM  --  3.3* 2.9* 2.9* 3.2*   < > 3.8   CHLORIDE  --   --   --   --   --   --  109*   CO2  --   --   --   --   --   --  22   BUN  --   --   --   --   --   --  18   CR  --   --   --   --   --   --  0.80   * 107* 124* 146* 146*   < > 80    < > = values in this interval not displayed.     Liver Function Tests  Recent Labs   Lab 07/20/23  1300 07/20/23  0602 07/15/23  1459   AST  --   --  24   ALT  --   --  18   ALKPHOS  --   --  81   BILITOTAL  --   --  0.5   ALBUMIN  --   --  4.6   INR 1.69* 1.26* 3.81*     Pancreatic Enzymes  No lab results found in last 7 days.  Coagulation Profile  Recent Labs   Lab 07/20/23  1300 07/20/23  0602 07/15/23  1459   INR 1.69* 1.26* 3.81*   PTT 33  --  44*         5. RADIOLOGY:       Recent Results (from the past 24 hour(s))   HANK with Report    Narrative    Antonio Elias MD     7/20/2023  2:04 PM  Perioperative HANK Procedure Note    Staff -        Anesthesiologist:  Behrens, Christopher J, MD       Resident/Fellow: Junaid Dyer MD       Performed By: anesthesiologist and with fellow       Procedure performed by resident/fellow/CRNA in presence of a teaching   physician.    Preanesthesia Checklist:  Patient identified, IV assessed, risks and   benefits discussed, monitors and equipment assessed, procedure being   performed at surgeon's request and anesthesia  consent obtained.    HANK Probe Insertion    Probe Status PRE Insertion: NO obvious damage  Probe type:  Adult 3D  Bite block used:   Molar  Insertion Technique: Easy, no oropharyngeal manipulation  Insertion complications: None obvious  Billing Report:HANK report by Anesthesiologist (See Separate Report note)  Probe Status POST Removal: NO obvious damage    HANK Report  General Procedure Information  Fellow/Resident Interpretation: I personally reviewed the images and the   fellow/resident interpretation.  I agree with the fellow/resident   interpretation as amended by myself.   Images for this study have been archived.  Modalities: 2D, 3D, CW Doppler, PW Doppler and Color flow mapping  Echocardiographic and Doppler Measurements  Right Ventricle:  Cavity size normal.    Hypertrophy not present.     Thrombus not present.    Global function moderately impaired.     Left Ventricle:  Cavity size normal.    Hypertrophy not present.     Thrombus not present.   Global Function normal.   Ejection Fraction 65%.      Ventricular Regional Function:  1- Basal Anteroseptal:  normal  2- Basal Anterior:  normal  3- Basal Anterolateral:  normal  4- Basal Inferolateral:  normal  5- Basal Inferior:  normal  6- Basal Inferoseptal:  normal  7- Mid Anteroseptal:  normal  8- Mid Anterior:  normal  9- Mid Anterolateral:  normal  10- Mid Inferolateral:  normal  11- Mid Inferior:  normal  12- Mid Inferoseptal:  normal  13- Apical Anterior:  normal  14- Apical Lateral:  normal  15- Apical Inferior:  normal  16- Apical Septal:  normal  17- [place]:  normal    Valves  Aortic Valve: Annulus normal.  Stenosis not present.  Area: 2.51 cm .    Mean Gradient: 2 mmHg.  Regurgitation absent.  Leaflets normal.  Leaflet   motions normal.    Mitral Valve: Annulus normal.  Stenosis not present.  Regurgitation   absent.  Leaflets normal.  Leaflet motions normal.    Tricuspid Valve: Annulus mechanical.  Mean Gradient: 9 mmHg.  Leaflet   motions restricted.     Pulmonic Valve: Annulus normal.  Stenosis not present.  Regurgitation   absent.      Aorta: Ascending Aorta: Size normal.  Diameter 2.12 cm.  Dissection not   present.  Plaque thickness less than 3 mm.  Mobile plaque not present.    Aortic Arch: Size normal.    Dissection not present.   Plaque thickness   less than 3 mm.   Mobile plaque not present.    Descending Aorta: Size normal.    Dissection not present.   Plaque   thickness less than 3 mm.   Mobile plaque not present.      Right Atrium:  Size dilated.   Spontaneous echo contrast not present.     Thrombus not present.   Tumor not present.   Device not present.     Left Atrium: Size normal.  Spontaneous echo contrast not present.    Thrombus not present.  Tumor not present.  Device not present.    Left atrial appendage normal.   Other Atria Findings:  SAMAN velocity 40.2 cm/s  Atrial Septum: Intra-atrial septal morphology normal.       Ventricular Septum: Intra-ventricular septum morphology normal.        Other Findings:   Pericardium:  normal. Pleural Effusion:  none. Pulmonary Arteries:    normal. Pulmonary Venous Flow:  blunted (decreased) systolic flow. No   coronary sinus catheter present.    Post Intervention Findings  Procedure(s) performed:  Tricuspid Valve Repair/Replace. Global function:    Unchanged. Regional wall motion: Improved. Surgeon(s) notified of all   postintervention findings: Yes.                 Echocardiogram Comments  Echocardiogram comments: Pre CPB:  LV normal function, EF 60-65%, no RWMA's noted. Normal diastolic function.   RV moderately impaired function. RA severely dilated. Tri-leaflet aortic   valve, no AI/AS. Mechanical bileaflet valve in the tricuspid position with   fixed leaflets in the semi-open position, with mean gradient of 9 mm Hg.   No PFO by CFD. No pericardial effusion. No pleural effusion. No clot in   SAMAN. No aortic dissection. All findings communicated with surgeon.    Post CPB:  S/p 29mm Epic bioprosthetic mitral  valve in the tricuspid position. The   valve is well seated, leaflets open and close appropriately, no   paravalvular leaks, with mean gradient of 3 mm Hg. LV normal functio, EF   60-65%, RV mildly impaired function. No AI/AS. Small left pleural   effusion. No PFO by CFD. No pericardial effusion. No clot in SAMAN. No   aortic dissection. All findings communicated with surgeon.  .   Cardiac Device Check - Inpatient   Result Value    Date Time Interrogation Session [id number]    Implantable Pulse Generator  [employer]    Implantable Pulse Generator Model 1272 Assurity MRI    Implantable Pulse Generator Serial Number [id number]    Type Interrogation Session In Clinic    Clinic Name [facility]    Implantable Pulse Generator Type Pacemaker    Implantable Pulse Generator Implant Date 20230224    Implantable Lead  [employer]    Implantable Lead Model 1084T MyoDex    Implantable Lead Serial Number [id number]    Implantable Lead Implant Date 20160211    Implantable Lead Polarity Type Bipolar Lead    Implantable Lead Location Detail 1 UNKNOWN    Implantable Lead Location Right Ventricle    Pardeep Setting Mode (NBG Code) VOO    Pardeep Setting Lower Rate Limit 80    Pardeep Setting Night Rate Off    Lead Channel Setting Sensing Polarity Bipolar    Lead Channel Setting Pacing Polarity Bipolar    Lead Channel Setting Pacing Pulse Width 1.0    Lead Channel Setting Pacing Amplitude 3.5    Lead Channel Impedance Value 230    Lead Channel Pacing Threshold Amplitude 1.75    Lead Channel Pacing Threshold Pulse Width 1.0    Battery Date Time of Measurements [id number]    Battery Status Beginning of Service    Battery Remaining Longevity 32    Battery Voltage 3.01    Pardeep Statistic RV Percent Paced 99.4    Narrative    Patient seen in [facility] for evaluation and iterative programming of their   pacemaker per MD orders.    Device: [employer] 1272 Assurity MRI  Normal device  function  Mode: VVIR 70 bpm  : >99%  Intrinsic Rhythm: CHB;  30 bpm  Lead Trends Appear Stable: Yes. Elevated RV threshold; consistent with   previous findings at 1.75 V @ 1.0 ms  Estimated battery longevity to RRT = 5.8-6.5 years. 91% remaining battery   to ALLISON.   Anticoagulant: Warfarin PTA  Ventricular Arrhythmia: None  Setting Changes: Programmed to VOO 80 bpm pre-op for sternotomy.     Plan: Page device RN for post-op re-programming.   JITENDRA Sanchez RN    Single lead pacemaker    I have reviewed and interpreted the device interrogation, settings,   programming and nurse's summary. The device is functioning within normal   device parameters. I agree with the current findings, assessment and plan.         =========================================

## 2023-07-20 NOTE — ANESTHESIA PROCEDURE NOTES
Central Line/PA Catheter Placement    Pre-Procedure   Staff -        Anesthesiologist:  Behrens, Christopher J, MD       Resident/Fellow: Antonio Elias MD       Performed By: resident and with residents       Procedure performed by resident/fellow/CRNA in presence of a teaching physician.         Location: OR       Pre-Anesthestic Checklist: patient identified, IV checked, site marked, risks and benefits discussed, informed consent, monitors and equipment checked, pre-op evaluation and at physician/surgeon's request  Timeout:       Correct Patient: Yes        Correct Procedure: Yes        Correct Site: Yes        Correct Position: Yes        Correct Laterality: Yes   Line Placement:   This line was placed Post Induction    Procedure   Procedure: central line       Laterality: right       Insertion Site: internal jugular.       Patient Position: Trendelenburg and supine  Sterile Prep        All elements of maximal sterile barrier technique followed       Patient Prep/Sterile Barriers: draped, hand hygiene, gloves , hat , mask , draped, gown, sterile gel and probe cover       Skin prep: Chloraprep  Insertion/Injection        Technique: ultrasound guided and Seldinger Technique        1. Ultrasound was used to evaluate the access site.       2. Vein evaluated via ultrasound for patency/adequacy.       3. Using real-time ultrasound the needle/catheter was observed entering the artery/vein.       Introducer Type: 9 Fr, 2-lumen MAC        Type: PA/CVC with Introducer       Catheter Size: 9 Fr       Catheter Length: 11.5       Number of Lumens: double lumen  Narrative         Secured by: suture       Tegaderm and Biopatch dressing used.       Complications: None apparent,        blood aspirated from all lumens,        All lumens flushed: Yes       Verification method: Ultrasound, Placement to be verified post-op and HANK

## 2023-07-20 NOTE — ANESTHESIA CARE TRANSFER NOTE
Patient: Silvia Otero    Procedure: Procedure(s):  REPEAT MEDIAN STERNOTOMY, LYSIS OF ADHESIONS, CARDIOPULMONARY BYPASS, TRICUSPID VALVE REPLACEMENT, TRANSESOPHAGEAL ECHOCARDIOGRAM PER ANESTHESIA  possible Redo sternotomy replace valve tricuspid       Diagnosis: Tricuspid regurgitation [I07.1]  Diagnosis Additional Information: No value filed.    Anesthesia Type:   General     Note:    Oropharynx: oropharynx clear of all foreign objects, endotracheal tube in place, oral gastic tube in place and ventilatory support  Level of Consciousness: iatrogenic sedation    Level of Supplemental Oxygen (L/min / FiO2): 10  Independent Airway: airway patency not satisfactory and stable  Dentition: dentition unchanged  Vital Signs Stable: post-procedure vital signs reviewed and stable  Report to RN Given: handoff report given  Patient transferred to: ICU    ICU Handoff: Call for PAUSE to initiate/utilize ICU HANDOFF, Identified Patient, Identified Responsible Provider, Reviewed the Pertinent Medical History, Discussed Surgical Course, Reviewed Intra-OP Anesthesia Management and Issues during Anesthesia, Set Expectations for Post Procedure Period and Allowed Opportunity for Questions and Acknowledgement of Understanding      Vitals:  Vitals Value Taken Time   BP     Temp 36.6  C (97.9  F) 07/20/23 1600   Pulse 84 07/20/23 1618   Resp 16 07/20/23 1618   SpO2 100 % 07/20/23 1618   Vitals shown include unvalidated device data.    Electronically Signed By: Antonio Cardenas MD  July 20, 2023  4:19 PM

## 2023-07-20 NOTE — LETTER
[facility] [address] [place] [address]  Phone: [phone]    July 26, 2023        Silvia Otero  [address] [place] [address] [address]   [facility] [place] [address]      To whom it may concern:    RE: Silvia Otero    Patient was admitted and treated for essential surgery at our hospital from 7/20/2023-7/26/2023. Patient should not return to work prior to 8/20/2023 - this time frame is essential for healing. Please excuse her from work for this time period.    When the patient returns to work, the following restrictions apply until 8/31/2023:    Reach Above Shoulders: Not at all (0 hours)  Lift, carry, push, and pull no more than: 0-10 lbs.     When the patient returns to work, the following restrictions apply until 8/12/2023:    Lift, carry, push, and pull no more than: 10-20 lbs.     Please contact me for questions or concerns.      Sincerely,      Sara Pepe PA-C  Cardiothoracic Surgery

## 2023-07-20 NOTE — BRIEF OP NOTE
[facility]    Brief Operative Note    Pre-operative diagnosis: Tricuspid regurgitation [I07.1]  Post-operative diagnosis Same    Procedure: Procedure(s):  REPEAT MEDIAN STERNOTOMY, LYSIS OF ADHESIONS, CARDIOPULMONARY BYPASS, TRICUSPID VALVE REPLACEMENT, TRANSESOPHAGEAL ECHOCARDIOGRAM PER ANESTHESIA  possible Redo sternotomy replace valve tricuspid  Surgeon: Surgeon(s) and Role:     * Bryce Nowak MD - Primary     * Remi Kilpatrick MD - Assisting     * Hiro Gillette MD - Resident - Assisting     * Star Alvarez MD - Fellow - Assisting  Anesthesia: General   Estimated Blood Loss: Per op report    Drains: 32 Fr meds x 2, 28 Fr pleural x 2  Specimens:   ID Type Source Tests Collected by Time Destination   1 : Explanted mechanical tricuspid valve Tissue Heart Valve, Tricuspid SURGICAL PATHOLOGY EXAM Bryce Nowak MD 7/20/2023 12:05 PM      Findings: Per op report  Complications: None.  Implants:   Implant Name Type Inv. Item Serial No.  Lot No. LRB No. Used Action   LEAD PACER EPICARDIAL 35CM 4968-35 - [id number] Leads LEAD PACER EPICARDIAL 35CM 4968-35 [id number] [employer]  N/A 1 Implanted   VALVE MITRAL EPIC STENTED PORCINE 29MM [id number] - [id number] Valve VALVE MITRAL EPIC STENTED PORCINE 29MM [id number] [employer]  N/A 1 Implanted

## 2023-07-20 NOTE — ANESTHESIA PROCEDURE NOTES
Perioperative HANK Procedure Note    Staff -        Anesthesiologist:  Behrens, Christopher J, MD       Resident/Fellow: Junaid Dyer MD       Performed By: anesthesiologist and with fellow       Procedure performed by resident/fellow/CRNA in presence of a teaching physician.    Preanesthesia Checklist:  Patient identified, IV assessed, risks and benefits discussed, monitors and equipment assessed, procedure being performed at surgeon's request and anesthesia consent obtained.    HANK Probe Insertion    Probe Status PRE Insertion: NO obvious damage  Probe type:  Adult 3D  Bite block used:   Molar  Insertion Technique: Easy, no oropharyngeal manipulation  Insertion complications: None obvious  Billing Report:HANK report by Anesthesiologist (See Separate Report note)  Probe Status POST Removal: NO obvious damage    HANK Report  General Procedure Information  Fellow/Resident Interpretation: I personally reviewed the images and the fellow/resident interpretation.  I agree with the fellow/resident interpretation as amended by myself.   Images for this study have been archived.  Modalities: 2D, 3D, CW Doppler, PW Doppler and Color flow mapping  Echocardiographic and Doppler Measurements  Right Ventricle:  Cavity size normal.    Hypertrophy not present.   Thrombus not present.    Global function moderately impaired.     Left Ventricle:  Cavity size normal.    Hypertrophy not present.   Thrombus not present.   Global Function normal.   Ejection Fraction 65%.      Ventricular Regional Function:  1- Basal Anteroseptal:  normal  2- Basal Anterior:  normal  3- Basal Anterolateral:  normal  4- Basal Inferolateral:  normal  5- Basal Inferior:  normal  6- Basal Inferoseptal:  normal  7- Mid Anteroseptal:  normal  8- Mid Anterior:  normal  9- Mid Anterolateral:  normal  10- Mid Inferolateral:  normal  11- Mid Inferior:  normal  12- Mid Inferoseptal:  normal  13- Apical Anterior:  normal  14- Apical Lateral:  normal  15- Apical  Inferior:  normal  16- Apical Septal:  normal  17- Brandon:  normal    Valves  Aortic Valve: Annulus normal.  Stenosis not present.  Area: 2.51 cm .  Mean Gradient: 2 mmHg.  Regurgitation absent.  Leaflets normal.  Leaflet motions normal.    Mitral Valve: Annulus normal.  Stenosis not present.  Regurgitation absent.  Leaflets normal.  Leaflet motions normal.    Tricuspid Valve: Annulus mechanical.  Mean Gradient: 9 mmHg.  Leaflet motions restricted.    Pulmonic Valve: Annulus normal.  Stenosis not present.  Regurgitation absent.      Aorta: Ascending Aorta: Size normal.  Diameter 2.12 cm.  Dissection not present.  Plaque thickness less than 3 mm.  Mobile plaque not present.    Aortic Arch: Size normal.    Dissection not present.   Plaque thickness less than 3 mm.   Mobile plaque not present.    Descending Aorta: Size normal.    Dissection not present.   Plaque thickness less than 3 mm.   Mobile plaque not present.      Right Atrium:  Size dilated.   Spontaneous echo contrast not present.   Thrombus not present.   Tumor not present.   Device not present.     Left Atrium: Size normal.  Spontaneous echo contrast not present.  Thrombus not present.  Tumor not present.  Device not present.    Left atrial appendage normal.   Other Atria Findings:  SAMAN velocity 40.2 cm/s  Atrial Septum: Intra-atrial septal morphology normal.       Ventricular Septum: Intra-ventricular septum morphology normal.        Other Findings:   Pericardium:  normal. Pleural Effusion:  none. Pulmonary Arteries:  normal. Pulmonary Venous Flow:  blunted (decreased) systolic flow. No coronary sinus catheter present.    Post Intervention Findings  Procedure(s) performed:  Tricuspid Valve Repair/Replace. Global function:  Unchanged. Regional wall motion: Improved. Surgeon(s) notified of all postintervention findings: Yes.                 Echocardiogram Comments  Echocardiogram comments: Pre CPB:  LV normal function, EF 60-65%, no RWMA's noted. Normal  diastolic function. RV moderately impaired function. RA severely dilated. Tri-leaflet aortic valve, no AI/AS. Mechanical bileaflet valve in the tricuspid position with fixed leaflets in the semi-open position, with mean gradient of 9 mm Hg. No PFO by CFD. No pericardial effusion. No pleural effusion. No clot in SAMAN. No aortic dissection. All findings communicated with surgeon.    Post CPB:  S/p 29mm Epic bioprosthetic mitral valve in the tricuspid position. The valve is well seated, leaflets open and close appropriately, no paravalvular leaks, with mean gradient of 3 mm Hg. LV normal functio, EF 60-65%, RV mildly impaired function. No AI/AS. Small left pleural effusion. No PFO by CFD. No pericardial effusion. No clot in SAMAN. No aortic dissection. All findings communicated with surgeon.  .

## 2023-07-21 ENCOUNTER — APPOINTMENT (OUTPATIENT)
Dept: OCCUPATIONAL THERAPY | Facility: CLINIC | Age: 28
DRG: 219 | End: 2023-07-21
Attending: STUDENT IN AN ORGANIZED HEALTH CARE EDUCATION/TRAINING PROGRAM
Payer: COMMERCIAL

## 2023-07-21 ENCOUNTER — APPOINTMENT (OUTPATIENT)
Dept: GENERAL RADIOLOGY | Facility: CLINIC | Age: 28
DRG: 219 | End: 2023-07-21
Attending: SURGERY
Payer: COMMERCIAL

## 2023-07-21 LAB
ALBUMIN SERPL BCG-MCNC: 4.5 G/DL (ref 3.5–5.2)
ALLEN'S TEST: ABNORMAL
ALP SERPL-CCNC: 53 U/L (ref 35–104)
ALT SERPL W P-5'-P-CCNC: 17 U/L (ref 0–50)
ANION GAP SERPL CALCULATED.3IONS-SCNC: 13 MMOL/L (ref 7–15)
AST SERPL W P-5'-P-CCNC: 50 U/L (ref 0–45)
ATRIAL RATE - MUSE: 97 BPM
BASE EXCESS BLDA CALC-SCNC: -1.4 MMOL/L (ref -9–1.8)
BILIRUB SERPL-MCNC: 1.1 MG/DL
BUN SERPL-MCNC: 11.3 MG/DL (ref 6–20)
CA-I BLD-MCNC: 5 MG/DL (ref 4.4–5.2)
CALCIUM SERPL-MCNC: 9.8 MG/DL (ref 8.6–10)
CHLORIDE SERPL-SCNC: 102 MMOL/L (ref 98–107)
CREAT SERPL-MCNC: 0.6 MG/DL (ref 0.51–0.95)
DEPRECATED HCO3 PLAS-SCNC: 21 MMOL/L (ref 22–29)
DIASTOLIC BLOOD PRESSURE - MUSE: NORMAL MMHG
ERYTHROCYTE [DISTWIDTH] IN BLOOD BY AUTOMATED COUNT: 19.4 % (ref 10–15)
GFR SERPL CREATININE-BSD FRML MDRD: >90 ML/MIN/1.73M2
GLUCOSE BLDC GLUCOMTR-MCNC: 125 MG/DL (ref 70–99)
GLUCOSE SERPL-MCNC: 147 MG/DL (ref 70–99)
HCO3 BLD-SCNC: 24 MMOL/L (ref 21–28)
HCT VFR BLD AUTO: 28.7 % (ref 35–47)
HGB BLD-MCNC: 8.6 G/DL (ref 11.7–15.7)
INTERPRETATION ECG - MUSE: NORMAL
MAGNESIUM SERPL-MCNC: 2.2 MG/DL (ref 1.7–2.3)
MCH RBC QN AUTO: 25.1 PG (ref 26.5–33)
MCHC RBC AUTO-ENTMCNC: 30 G/DL (ref 31.5–36.5)
MCV RBC AUTO: 84 FL (ref 78–100)
O2/TOTAL GAS SETTING VFR VENT: 21 %
OXYHGB MFR BLD: 90 % (ref 92–100)
P AXIS - MUSE: NORMAL DEGREES
PATH REPORT.COMMENTS IMP SPEC: NORMAL
PATH REPORT.COMMENTS IMP SPEC: NORMAL
PATH REPORT.GROSS SPEC: NORMAL
PATH REPORT.RELEVANT HX SPEC: NORMAL
PCO2 BLD: 39 MM HG (ref 35–45)
PH BLD: 7.39 [PH] (ref 7.35–7.45)
PHOSPHATE SERPL-MCNC: 4.3 MG/DL (ref 2.5–4.5)
PHOTO IMAGE: NORMAL
PLATELET # BLD AUTO: 151 10E3/UL (ref 150–450)
PO2 BLD: 64 MM HG (ref 80–105)
POTASSIUM SERPL-SCNC: 4.8 MMOL/L (ref 3.4–5.3)
PR INTERVAL - MUSE: NORMAL MS
PROT SERPL-MCNC: 7.1 G/DL (ref 6.4–8.3)
QRS DURATION - MUSE: 176 MS
QT - MUSE: 486 MS
QTC - MUSE: 532 MS
R AXIS - MUSE: 176 DEGREES
RBC # BLD AUTO: 3.43 10E6/UL (ref 3.8–5.2)
SODIUM SERPL-SCNC: 136 MMOL/L (ref 136–145)
SYSTOLIC BLOOD PRESSURE - MUSE: NORMAL MMHG
T AXIS - MUSE: -32 DEGREES
VENTRICULAR RATE- MUSE: 72 BPM
WBC # BLD AUTO: 11 10E3/UL (ref 4–11)

## 2023-07-21 PROCEDURE — 82805 BLOOD GASES W/O2 SATURATION: CPT | Performed by: STUDENT IN AN ORGANIZED HEALTH CARE EDUCATION/TRAINING PROGRAM

## 2023-07-21 PROCEDURE — 250N000011 HC RX IP 250 OP 636: Performed by: STUDENT IN AN ORGANIZED HEALTH CARE EDUCATION/TRAINING PROGRAM

## 2023-07-21 PROCEDURE — 84100 ASSAY OF PHOSPHORUS: CPT | Performed by: STUDENT IN AN ORGANIZED HEALTH CARE EDUCATION/TRAINING PROGRAM

## 2023-07-21 PROCEDURE — 97535 SELF CARE MNGMENT TRAINING: CPT | Mod: GO

## 2023-07-21 PROCEDURE — 200N000002 HC R&B ICU UMMC

## 2023-07-21 PROCEDURE — 71045 X-RAY EXAM CHEST 1 VIEW: CPT

## 2023-07-21 PROCEDURE — 99233 SBSQ HOSP IP/OBS HIGH 50: CPT

## 2023-07-21 PROCEDURE — 80053 COMPREHEN METABOLIC PANEL: CPT | Performed by: STUDENT IN AN ORGANIZED HEALTH CARE EDUCATION/TRAINING PROGRAM

## 2023-07-21 PROCEDURE — 97530 THERAPEUTIC ACTIVITIES: CPT | Mod: GO

## 2023-07-21 PROCEDURE — 93005 ELECTROCARDIOGRAM TRACING: CPT

## 2023-07-21 PROCEDURE — 85027 COMPLETE CBC AUTOMATED: CPT | Performed by: STUDENT IN AN ORGANIZED HEALTH CARE EDUCATION/TRAINING PROGRAM

## 2023-07-21 PROCEDURE — 250N000013 HC RX MED GY IP 250 OP 250 PS 637: Performed by: STUDENT IN AN ORGANIZED HEALTH CARE EDUCATION/TRAINING PROGRAM

## 2023-07-21 PROCEDURE — 71045 X-RAY EXAM CHEST 1 VIEW: CPT | Mod: 26 | Performed by: RADIOLOGY

## 2023-07-21 PROCEDURE — 97166 OT EVAL MOD COMPLEX 45 MIN: CPT | Mod: GO

## 2023-07-21 PROCEDURE — 82330 ASSAY OF CALCIUM: CPT | Performed by: STUDENT IN AN ORGANIZED HEALTH CARE EDUCATION/TRAINING PROGRAM

## 2023-07-21 PROCEDURE — 83735 ASSAY OF MAGNESIUM: CPT | Performed by: STUDENT IN AN ORGANIZED HEALTH CARE EDUCATION/TRAINING PROGRAM

## 2023-07-21 RX ORDER — DEXTROSE MONOHYDRATE 25 G/50ML
25-50 INJECTION, SOLUTION INTRAVENOUS
Status: DISCONTINUED | OUTPATIENT
Start: 2023-07-21 | End: 2023-07-21

## 2023-07-21 RX ORDER — NICOTINE POLACRILEX 4 MG
15-30 LOZENGE BUCCAL
Status: DISCONTINUED | OUTPATIENT
Start: 2023-07-21 | End: 2023-07-21

## 2023-07-21 RX ADMIN — GABAPENTIN 100 MG: 100 CAPSULE ORAL at 07:42

## 2023-07-21 RX ADMIN — SENNOSIDES AND DOCUSATE SODIUM 1 TABLET: 50; 8.6 TABLET ORAL at 07:41

## 2023-07-21 RX ADMIN — GABAPENTIN 100 MG: 100 CAPSULE ORAL at 15:03

## 2023-07-21 RX ADMIN — CEFAZOLIN SODIUM 2 G: 2 INJECTION, SOLUTION INTRAVENOUS at 12:00

## 2023-07-21 RX ADMIN — OXYCODONE HYDROCHLORIDE 10 MG: 10 TABLET ORAL at 18:06

## 2023-07-21 RX ADMIN — HYDROMORPHONE HYDROCHLORIDE 0.2 MG: 0.2 INJECTION, SOLUTION INTRAMUSCULAR; INTRAVENOUS; SUBCUTANEOUS at 02:53

## 2023-07-21 RX ADMIN — OXYCODONE HYDROCHLORIDE 10 MG: 10 TABLET ORAL at 07:42

## 2023-07-21 RX ADMIN — METHOCARBAMOL 750 MG: 750 TABLET ORAL at 23:32

## 2023-07-21 RX ADMIN — METHOCARBAMOL 750 MG: 750 TABLET ORAL at 02:53

## 2023-07-21 RX ADMIN — HEPARIN SODIUM 5000 UNITS: 5000 INJECTION, SOLUTION INTRAVENOUS; SUBCUTANEOUS at 16:14

## 2023-07-21 RX ADMIN — ACETAMINOPHEN 975 MG: 325 TABLET, FILM COATED ORAL at 15:03

## 2023-07-21 RX ADMIN — ACETAMINOPHEN 975 MG: 325 TABLET, FILM COATED ORAL at 07:42

## 2023-07-21 RX ADMIN — OXYCODONE HYDROCHLORIDE 10 MG: 10 TABLET ORAL at 04:02

## 2023-07-21 RX ADMIN — POLYETHYLENE GLYCOL 3350 17 G: 17 POWDER, FOR SOLUTION ORAL at 07:42

## 2023-07-21 RX ADMIN — GABAPENTIN 100 MG: 100 CAPSULE ORAL at 20:46

## 2023-07-21 RX ADMIN — Medication 10 MG: at 21:41

## 2023-07-21 RX ADMIN — HYDROMORPHONE HYDROCHLORIDE 0.4 MG: 0.2 INJECTION, SOLUTION INTRAMUSCULAR; INTRAVENOUS; SUBCUTANEOUS at 09:54

## 2023-07-21 RX ADMIN — HEPARIN SODIUM 5000 UNITS: 5000 INJECTION, SOLUTION INTRAVENOUS; SUBCUTANEOUS at 23:36

## 2023-07-21 RX ADMIN — HYDROMORPHONE HYDROCHLORIDE 0.2 MG: 0.2 INJECTION, SOLUTION INTRAMUSCULAR; INTRAVENOUS; SUBCUTANEOUS at 06:21

## 2023-07-21 RX ADMIN — PANTOPRAZOLE SODIUM 40 MG: 40 TABLET, DELAYED RELEASE ORAL at 07:42

## 2023-07-21 RX ADMIN — ASPIRIN 81 MG CHEWABLE TABLET 81 MG: 81 TABLET CHEWABLE at 07:41

## 2023-07-21 RX ADMIN — METHOCARBAMOL 750 MG: 750 TABLET ORAL at 09:53

## 2023-07-21 RX ADMIN — ACETAMINOPHEN 975 MG: 325 TABLET, FILM COATED ORAL at 23:32

## 2023-07-21 RX ADMIN — LIDOCAINE PATCH 4% 1 PATCH: 40 PATCH TOPICAL at 20:46

## 2023-07-21 RX ADMIN — CEFAZOLIN SODIUM 2 G: 2 INJECTION, SOLUTION INTRAVENOUS at 03:50

## 2023-07-21 RX ADMIN — HYDROMORPHONE HYDROCHLORIDE 0.4 MG: 0.2 INJECTION, SOLUTION INTRAMUSCULAR; INTRAVENOUS; SUBCUTANEOUS at 21:41

## 2023-07-21 RX ADMIN — SENNOSIDES AND DOCUSATE SODIUM 1 TABLET: 50; 8.6 TABLET ORAL at 20:46

## 2023-07-21 RX ADMIN — METHOCARBAMOL 750 MG: 750 TABLET ORAL at 18:06

## 2023-07-21 RX ADMIN — OXYCODONE HYDROCHLORIDE 10 MG: 10 TABLET ORAL at 13:01

## 2023-07-21 ASSESSMENT — ACTIVITIES OF DAILY LIVING (ADL)
ADLS_ACUITY_SCORE: 26
DEPENDENT_IADLS:: INDEPENDENT
ADLS_ACUITY_SCORE: 26

## 2023-07-21 NOTE — PROGRESS NOTES
CV ICU PROGRESS NOTE  07/21/2023      Date of Service (when I saw the patient): 07/21/2023    ASSESSMENT: Silvia Otero is a 27 year old female with PMH of anemia,  hx of an atrial myxoma operated on in [place] c/b damage to the tricuspid valve requiring replacement in 2001 and a revision in 2016. Pacemaker placed in February of 2023 for 3rd degree heart block  Presents to [facility] for redo sternotomy, explantation of mechanical tricuspid valve and replacement with bioprosthetic mitral valve in the tricuspid position by Dr. Nowak. Had permanent pacemaker placed and temporary pacemaker wires which are sewed in and will need to be cut when ready.     Patient presents to the ICU intubated and sedated. Pacer dependent VVIR at 70 reprogrammed to VOO at 70 on warfarin bridged to Lovenox. Easy mask and airway. L rad A line R CVC with no PAC. Mod RV dysfunction 2/2 adhesions, improved to mild reduced when adhesions freed. All other valves fine. LV function good. Patient went off bypass well. During the case she received 1000mcg of fentanyl, 1mg of dilaudid, 2.3L of crystalloid, 2u platelets, 265ml of Cell saver. She had 1L UOP, and was reversed. Patient is fast track status.      CO-MORBIDITIES:       Patient Active Problem List   Diagnosis     Mural thrombus of heart     History of tricuspid valve replacement with mechanical valve     Atrial flutter, unspecified type (H)     Chest pressure     Iron deficiency anemia due to chronic blood loss     Prosthetic tricuspid valve stenosis               PLAN:  Neuro/ pain/ sedation:  - Monitor neurological status. Notify the MD for any acute changes in exam.  #Acute postoperative pain  - Scheduled: Tylenol  - PRN: Tylenol, oxycodone, Dilaudid       Pulmonary care:   # No acute concerns   - Supplemental oxygen to maintain SpO2  92%  - Encourage IS q15-30 minutes when awake.     Cardiovascular:    #Hx of atrial myxoma  s/p resection   #Hx of prior Triscuspid revision (2001) and  replacement (2016)  #3rd Degree AV block s/p pacemaker 2023  #s/p redo sternotomy, explantation of mechanical valve and replacement with bioprosthetic tricuspid valve by Dr. Nowak 7/20/22   - Not on any pressors immediatly post op  - Goal MAP >65, SBP <140, monitor hemodynamics  - Hold Statin   - Hold BB   - Meds: ASA 81 mg  - Permanent pacemaker inplace; 100% paced at 70 bpm  - Temporary wires capped 7/21  - V-wires suture inplace and will need to be cut when ready       Plan: Patient no has bioprosthetic valve; will not need anticoagulation per Dr. Nowak; ASA only    GI care / Nutrition:   # Obesity  - 2gm Na+ diet  - PPI  - Bowel regimen: MiraLAX, senna     Renal / Fluids / Electrolytes:   BL creat appears to be ~ 0.8  - Strict I/O, daily weights, avoid/limit nephrotoxins  - Replete lytes PRN per protocol     Endocrine:    #Stress hyperglycemia  Preop A1c 5.2  - Post-op blood glucose checks  - Goal BG <180 for optimal healing     ID / Antibiotics:  # No acute concerns  Bisi-op antibiotics recieved  - Monitor fever curve, WBC, and inflammatory markers as appropriate  - WBC 2.9 preoperatively      Heme:     #Acute on Chronic Anemia  #Acute on Chronic Thrombocytopenia - improved  #Hx of Pancytopenia   No s/sx active bleeding  - CBC   - Hgb goal > 7.0  - Hemoglobin 10.5 preoperatively      MSK / Skin:  #Sternotomy  #Surgical Incision  - Sternal precautions, postop incision management protocol  - PT/OT/CR     Prophylaxis:     - Mechanical DVT ppx  - Chemical DVT ppx: start SQH tomorrow  - PPI     Lines / Tubes / Drains:  - ETT  - RIJ CVC, PA catheter  - Arterial Line  - CTs x4  - Iglesias  - NG     Disposition:  - Transfer to floor when bed available        Patient seen and findings/plan discussed with medical ICU staff, Dr. Jordan.  Subsequent time spent: 35 min    PORSCHE Phipps CNP          ====================================  INTERVAL HISTORY:   Patient complaining of back pain and sternal pain with inspiration;  per bedside staff and patient pain regimen adequate    OBJECTIVE:   1. VITAL SIGNS:   Temp:  [97.9  F (36.6  C)-100.9  F (38.3  C)] 100  F (37.8  C)  Pulse:  [69-88] 71  Resp:  [10-33] 10  MAP:  [72 mmHg-98 mmHg] 96 mmHg  Arterial Line BP: (110-140)/(54-78) 136/76  FiO2 (%):  [40 %-50 %] 40 %  SpO2:  [93 %-100 %] 99 %  Vent Mode: PS  (Pressure Support)  FiO2 (%): 40 %  Resp Rate (Set): 16 breaths/min  Tidal Volume (Set, mL): 450 mL  PEEP (cm H2O): 5 cmH2O  Pressure Support (cm H2O): 10 cmH2O  Resp: 10    2. INTAKE/ OUTPUT:   I/O last 3 completed shifts:  In: 4507.46 [P.O.:440; I.V.:2594.46; Other:230]  Out: 3055 [Urine:2335; Chest Tube:720]    3. PHYSICAL EXAMINATION:    GEN: not in distress  EYES: PERRL, Anicteric sclera.   HEENT:  Normocephalic, atraumatic, trachea midline, Pupils PERRLA  CV: RRR, no gallops, rubs, or murmurs  PULM/CHEST: Clear breath sounds bilaterally upper and course bilateral lower lobes without wheeze, symmetric chest rise  GI: normal bowel sounds, soft, non-tender, no rebound tenderness or guarding, no masses  : oneil catheter in place, urine yellow and clear  EXTREMITIES: No peripheral edema, moving all extremities, peripheral pulses intact  NEURO: Cranial nerves II-XII grossly intact, no motor-sensory deficits noted  SKIN: Sternal incision CDI  PSYCH:  Affect: appropriate, and anxious     4. LABS:   Arterial Blood Gases   Recent Labs   Lab 07/21/23  0329 07/20/23  1938 07/20/23  1519 07/20/23  1409   PH 7.39 7.37 7.45 7.41   PCO2 39 42 34* 36   PO2 64* 155* 199* 227*   HCO3 24 24 24 23     Complete Blood Count   Recent Labs   Lab 07/21/23  0329 07/20/23  2202 07/20/23  1518 07/20/23  1409 07/20/23  1304 07/20/23  1300   WBC 11.0 8.6 5.6  --   --  14.9*   HGB 8.6* 8.2* 7.5* 7.6*   < > 8.4*    123* 97*  --   --  103*    < > = values in this interval not displayed.     Basic Metabolic Panel  Recent Labs   Lab 07/21/23  0329 07/20/23  2202 07/20/23  1949 07/20/23  1519 07/20/23  1518  07/20/23  1409 07/20/23  0818 07/15/23  1459    141  --   --  141 140   < > 141   POTASSIUM 4.8 4.3  4.3  --   --  4.0 3.3*   < > 3.8   CHLORIDE 102 108*  --   --  109*  --   --  109*   CO2 21* 22  --   --  21*  --   --  22   BUN 11.3 12.1  --   --  11.7  --   --  18   CR 0.60 0.62  --   --  0.67  --   --  0.80   * 144* 143* 101* 103* 107*   < > 80    < > = values in this interval not displayed.     Liver Function Tests  Recent Labs   Lab 07/21/23  0329 07/20/23  2202 07/20/23  1518 07/20/23  1300 07/20/23  0602 07/15/23  1459   AST 50* 47* 39  --   --  24   ALT 17 16 15  --   --  18   ALKPHOS 53 51 47  --   --  81   BILITOTAL 1.1 0.9 0.9  --   --  0.5   ALBUMIN 4.5 4.3 3.9  --   --  4.6   INR  --   --  1.70* 1.69* 1.26* 3.81*     Coagulation Profile  Recent Labs   Lab 07/20/23  1518 07/20/23  1300 07/20/23  0602 07/15/23  1459   INR 1.70* 1.69* 1.26* 3.81*   PTT 38 33  --  44*       5. RADIOLOGY:   Recent Results (from the past 24 hour(s))   Cardiac Device Check - Inpatient   Result Value    Date Time Interrogation Session [id number]    Implantable Pulse Generator  [employer]    Implantable Pulse Generator Model 1272 Assurity MRI    Implantable Pulse Generator Serial Number [id number]    Type Interrogation Session In Clinic    Clinic Name [facility]    Implantable Pulse Generator Type Pacemaker    Implantable Pulse Generator Implant Date 20230224    Implantable Lead  [employer]    Implantable Lead Model 1084T MyoDex    Implantable Lead Serial Number [id number]    Implantable Lead Implant Date 20160211    Implantable Lead Polarity Type Bipolar Lead    Implantable Lead Location Detail 1 UNKNOWN    Implantable Lead Location Right Ventricle    Pardeep Setting Mode (NBG Code) VOO    Pardeep Setting Lower Rate Limit 80    Pardeep Setting Night Rate Off    Lead Channel Setting Sensing Polarity Bipolar    Lead Channel Setting Pacing Polarity Bipolar    Lead  Channel Setting Pacing Pulse Width 1.0    Lead Channel Setting Pacing Amplitude 3.5    Lead Channel Impedance Value 230    Lead Channel Pacing Threshold Amplitude 1.75    Lead Channel Pacing Threshold Pulse Width 1.0    Battery Date Time of Measurements [id number]    Battery Status Beginning of Service    Battery Remaining Longevity 32    Battery Voltage 3.01    Pardeep Statistic RV Percent Paced 99.4    Narrative    Patient seen in [facility] 3C for evaluation and iterative programming of their   pacemaker per MD orders.    Device: [employer] 1272 Assurity MRI  Normal device function  Mode: VVIR 70 bpm  : >99%  Intrinsic Rhythm: CHB;  30 bpm  Lead Trends Appear Stable: Yes. Elevated RV threshold; consistent with   previous findings at 1.75 V @ 1.0 ms  Estimated battery longevity to RRT = 5.8-6.5 years. 91% remaining battery   to ALLISON.   Anticoagulant: Warfarin PTA  Ventricular Arrhythmia: None  Setting Changes: Programmed to VOO 80 bpm pre-op for sternotomy.     Plan: Page device RN for post-op re-programming.   JITENDRA Sanchez RN    Single lead pacemaker    I have reviewed and interpreted the device interrogation, settings,   programming and nurse's summary. The device is functioning within normal   device parameters. I agree with the current findings, assessment and plan.     XR Chest Port 1 View    Narrative    EXAM: XR CHEST PORT 1 VIEW  7/20/2023 3:55 PM      HISTORY: Post Op CVTS Surgery    COMPARISON: Radiograph 6/6/2023    FINDINGS: Single view of the chest. Endotracheal tube tip projects 4.3  cm from the [place]. Median sternotomy wires. Mediastinal drains x2.  Gastric tube with sidehole projecting near the gastroesophageal  junction. Bibasilar chest tubes. Right IJ sheath projects near the  confluence of the innominate veins. Tricuspid valve prosthesis.    Trachea is midline. Continued cardiomegaly. No substantial pleural  effusion. Mild hazy perihilar and bibasilar pulmonary opacities. No  discernible  pneumothorax.       Impression    IMPRESSION:   1. Support devices as detailed above.  2. Mild hazy perihilar and bibasilar mixed pulmonary opacities, likely  atelectasis/edema.    I have personally reviewed the examination and initial interpretation  and I agree with the findings.    GREGG HAMM MD         SYSTEM ID:  [id number]   Cardiac Device Check - Inpatient   Result Value    Date Time Interrogation Session [id number]    Implantable Pulse Generator  [employer]    Implantable Pulse Generator Model 1272 Assurity MRI    Implantable Pulse Generator Serial Number [id number]    Type Interrogation Session In Clinic    Clinic Name [facility]    Implantable Pulse Generator Type Pacemaker    Implantable Pulse Generator Implant Date 20230224    Implantable Lead  [employer]    Implantable Lead Model 1084T MyoDex    Implantable Lead Serial Number [id number]    Implantable Lead Implant Date 20160211    Implantable Lead Polarity Type Bipolar Lead    Implantable Lead Location Detail 1 UNKNOWN    Implantable Lead Location Right Ventricle    Pardeep Setting Mode (NBG Code) VVIR    Pardeep Setting Lower Rate Limit 70    Pardeep Setting Maximum Sensor Rate 130    Pardeep Setting Hysterisis Rate 60    Pardeep Setting Night Rate Off    Lead Channel Setting Sensing Polarity Bipolar    Lead Channel Setting Sensing Sensitivity 2.0    Lead Channel Setting Pacing Polarity Bipolar    Lead Channel Setting Pacing Pulse Width 1.0    Lead Channel Setting Pacing Amplitude 2.5    Lead Channel Setting Pacing Capture Mode Adaptive    Lead Channel Impedance Value 200.0    Lead Channel Pacing Threshold Amplitude 2.25    Lead Channel Pacing Threshold Pulse Width 1.0    Battery Date Time of Measurements [id number]    Battery Status Beginning of Service    Battery Remaining Longevity 63    Battery Voltage 3.01    Pardeep Statistic RV Percent Paced 100.0    Narrative    Patient seen in [facility] 4E for  evaluation and iterative post-op programming of their pacemaker per MD orders.    Device: [employer] 1272 Assurity MRI  Normal device function  Mode: VVIR 70 bpm  : 100%  Intrinsic Rhythm: CHB;  30 bpm  Lead Trends Appear Stable: Impedance measuring low end of range at 200 ohms post op vs. 230 ohms pre-op. Impedance range since implant 212.5-230 ohms. RV threshold increased from 1.75 V @ 1.0 ms to 2.25 V at 1.0 ms post-op.   Estimated battery longevity to RRT = 5.3-6.3 years./Battery voltage = 3.01 V.   Ventricular Arrhythmia: None  Setting Changes: Re-programmed to pre-procedure settings- from VOO 80 bpm to VVIR 70 bpm    Plan: Page device RN as needed while inpatient.   JITENDRA Sanchez RN    Single lead pacemaker    I have reviewed and interpreted the device interrogation, settings, programming and nurse's summary. The device is functioning within normal device parameters. I agree with the current findings, assessment and plan.

## 2023-07-21 NOTE — PROGRESS NOTES
"   07/21/23 1042   Appointment Info   Signing Clinician's Name / Credentials (OT) Jade Palomares OTR/L   Rehab Comments (OT) Sternal precautions   Living Environment   People in Home parent(s)  (mother)   Current Living Arrangements apartment   Home Accessibility stairs to enter home   Number of Stairs, Main Entrance 3   Stair Railings, Main Entrance railing on left side (ascending)   Transportation Anticipated family or friend will provide   Living Environment Comments Pt reports living with mother in one story apartment. 2-3 HELDER w one handrail. Has elevator access indoors   Self-Care   Usual Activity Tolerance excellent   Current Activity Tolerance moderate   Regular Exercise Yes   Activity/Exercise Type running/jogging   Exercise Amount/Frequency other (see comments)  (gym 4-5x/week)   Equipment Currently Used at Home none   Fall history within last six months no   Activity/Exercise/Self-Care Comment Pt reports IND in ADLs prior to admission   Instrumental Activities of Daily Living (IADL)   IADL Comments IND in IADLs prior to admission, works as full time caregiver for two clients.   General Information   Onset of Illness/Injury or Date of Surgery 07/20/23   Referring Physician Star Alvarez MD   Patient/Family Therapy Goal Statement (OT) Return home and to PLOF   Additional Occupational Profile Info/Pertinent History of Current Problem Per EMR, \"27 year old female with PMH of anemia,  hx of an atrial myxoma operated on in [place] c/b damage to the tricuspid valve requiring replacement in 2001 and a revision in 2016. Pacemaker placed in February of 2023 for 3rd degree heart block  Presents to [facility] for redo sternotomy, explantation of mechanical tricuspid valve and replacement with bioprosthetic mitral valve in the tricuspid position by Dr. Nowak. Had permanent pacemaker placed and temporary pacemaker wires which are sewed in and will need to be cut when ready.\"   Existing Precautions/Restrictions " cardiac;fall;sternal   Left Upper Extremity (Weight-bearing Status) partial weight-bearing (PWB)  (10 lbs)   Right Upper Extremity (Weight-bearing Status) partial weight-bearing (PWB)  (10 lbs)   Left Lower Extremity (Weight-bearing Status) full weight-bearing (FWB)   Right Lower Extremity (Weight-bearing Status) full weight-bearing (FWB)   Cognitive Status Examination   Orientation Status orientation to person, place and time   Cognitive Status Comments Lethargic and self limiting, A&Ox4.   Visual Perception   Visual Impairment/Limitations corrective lenses for reading;WFL   Sensory   Sensory Quick Adds sensation intact   Pain Assessment   Patient Currently in Pain Yes, see Vital Sign flowsheet   Posture   Posture protracted shoulders;forward head position   Range of Motion Comprehensive   Comment, General Range of Motion no formal testing d/t precautions, WFL   Strength Comprehensive (MMT)   Comment, General Manual Muscle Testing (MMT) Assessment no formal testing d/t precautions, appear WFL. General weakness   Coordination   Upper Extremity Coordination No deficits were identified   Bed Mobility   Comment (Bed Mobility) Not assessed this date. Pt up in chair upon arrival   Transfers   Transfers sit-stand transfer   Sit-Stand Transfer   Sit-Stand Bernard (Transfers) minimum assist (75% patient effort)   Balance   Balance Assessment standing balance: dynamic   Balance Comments CGA-Dewayne   Activities of Daily Living   BADL Assessment/Intervention bathing;lower body dressing;grooming;toileting   Bathing Assessment/Intervention   Bernard Level (Bathing) minimum assist (75% patient effort)   Comment, (Bathing) per clinical judgement   Lower Body Dressing Assessment/Training   Comment, (Lower Body Dressing) donning socks and brief   Bernard Level (Lower Body Dressing) maximum assist (25% patient effort)   Grooming Assessment/Training   Bernard Level (Grooming) supervision   Comment, (Grooming) per  clinical judgement   Toileting   Comment, (Toileting) per clinical judgement   [place] Level (Toileting) maximum assist (25% patient effort)   Clinical Impression   Criteria for Skilled Therapeutic Interventions Met (OT) Yes, treatment indicated   OT Diagnosis Dec IND in I/ADLs   OT Problem List-Impairments impacting ADL problems related to;activity tolerance impaired;balance;mobility;range of motion (ROM);strength;post-surgical precautions;pain   Assessment of Occupational Performance 3-5 Performance Deficits   Identified Performance Deficits dressing, bathing, toileting, functional transfers/mobility   Planned Therapy Interventions (OT) ADL retraining;IADL retraining;balance training;bed mobility training;ROM;strengthening;transfer training;home program guidelines;progressive activity/exercise;risk factor education   Clinical Decision Making Complexity (OT) moderate complexity   Anticipated Equipment Needs Upon Discharge (OT)   (TBD)   Risk & Benefits of therapy have been explained evaluation/treatment results reviewed;care plan/treatment goals reviewed;risks/benefits reviewed;current/potential barriers reviewed;participants voiced agreement with care plan;participants included;patient   Clinical Impression Comments Pt appears below functional baseline. Would benefit from continued OT services to promote safety and IND in ADLs   OT Total Evaluation Time   OT Eval, Moderate Complexity Minutes (07969) 8   OT Discharge Planning   OT Plan further functional mobility in correa, LB dressing, toilet transfer, standing g/h tasks at sink   OT Discharge Recommendation (DC Rec) home with assist;home with outpatient cardiac rehab   OT Rationale for DC Rec Pt appears below functional baseline. Self limiting and limited by pain this date. Pt reports mother will be avail for 24/7 assist. Anticipate pt will be safe for return home with fam assistance and OP CR once medically ready. Will continue to benefit from therapy to  promote safety and IND in I/ADLs   OT Brief overview of current status Claudia-CGA STS   Total Session Time   Timed Code Treatment Minutes 40   Total Session Time (sum of timed and untimed services) 48

## 2023-07-21 NOTE — PROGRESS NOTES
CLINICAL NUTRITION SERVICES - BRIEF NOTE    Received provider consult for nutrition education with comments post op cardiovascular surgery (automatic consult on post-op order set). S/p TVR on 7/20. Nutrition education not indicated.    RD will follow per LOS protocol or if re-consulted.     Charlene Coates MS, RD, LD  4E (CVICU) RD pager: [phone]  Ascom: [id number]  Weekend/Holiday RD pager: [phone]

## 2023-07-21 NOTE — ANESTHESIA POSTPROCEDURE EVALUATION
Patient: Silvia Otero    Procedure: Procedure(s):  REPEAT MEDIAN STERNOTOMY, LYSIS OF ADHESIONS, CARDIOPULMONARY BYPASS, TRICUSPID VALVE REPLACEMENT, TRANSESOPHAGEAL ECHOCARDIOGRAM PER ANESTHESIA  possible Redo sternotomy replace valve tricuspid       Anesthesia Type:  General    Note:  Disposition: ICU            ICU Sign Out: Anesthesiologist/ICU physician sign out WAS performed   Postop Pain Control:    PONV:    Neuro/Psych:    Airway/Respiratory:             Sign Out: AIRWAY IN SITU/Resp. Support               Airway in situ/Resp. Support: ETT                 Reason: Planned Pre-op   CV/Hemodynamics:             Sign Out: Detailed CV status               Blood Pressure: Normal               Rate/Rhythm: PM dependent   Other NRE: NONE   DID A NON-ROUTINE EVENT OCCUR?            Last vitals:  Vitals:    07/20/23 1800 07/20/23 1835 07/20/23 1900   BP:      Pulse: 70 72 70   Resp: 16  25   Temp: 36.9  C (98.4  F)  37.4  C (99.3  F)   SpO2: 100%  100%       Electronically Signed By: Hiram Albarran MD  July 20, 2023  7:18 PM

## 2023-07-21 NOTE — CONSULTS
Care Management Initial Consult    General Information  Assessment completed with: Patient, Parents, pt and mom Eda  Type of CM/SW Visit: Initial Assessment    Primary Care Provider verified and updated as needed: Yes   Readmission within the last 30 days: no previous admission in last 30 days      Reason for Consult: discharge planning  Advance Care Planning: Advance Care Planning Reviewed: no concerns identified          Communication Assessment  Patient's communication style: spoken language (English or Bilingual)    Hearing Difficulty or Deaf: no   Wear Glasses or Blind: yes    Cognitive  Cognitive/Neuro/Behavioral: WDL  Level of Consciousness: lethargic  Arousal Level: opens eyes spontaneously  Orientation: oriented x 4  Mood/Behavior: behavior appropriate to situation  Best Language: 0 - No aphasia  Speech: slow, whispers    Living Environment:   People in home: spouse     Current living Arrangements: apartment      Able to return to prior arrangements: yes       Family/Social Support:  Care provided by: self, spouse/significant other, parent(s)  Provides care for: no one  Marital Status:   , Parent(s)  Brayan       Description of Support System: Supportive, Involved    Support Assessment: Adequate family and caregiver support, Adequate social supports    Current Resources:   Patient receiving home care services: No     Community Resources: None  Equipment currently used at home: none  Supplies currently used at home: None    Employment/Financial:  Employment Status: employed part-time     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  No    Lifestyle & Psychosocial Needs:  Social Determinants of Health     Tobacco Use: Low Risk  (7/20/2023)    Patient History      Smoking Tobacco Use: Never      Smokeless Tobacco Use: Never      Passive  Exposure: Not on file   Alcohol Use: Not on file   Financial Resource Strain: Not on file   Food Insecurity: Not on file   Transportation Needs: Not on file   Physical Activity: Not on file   Stress: Not on file   Social Connections: Not on file   Intimate Partner Violence: Not on file   Depression: Not at risk (7/3/2023)    PHQ-2      PHQ-2 Score: 0   Housing Stability: Not on file       Functional Status:  Prior to admission patient needed assistance:   Dependent ADLs:: Independent  Dependent IADLs:: Independent  Assesssment of Functional Status: Not at baseline with ADL Functioning    Mental Health Status:  Mental Health Status: No Current Concerns       Chemical Dependency Status:  Chemical Dependency Status: No Current Concerns             Values/Beliefs:  Spiritual, Cultural Beliefs, Restoration Practices, Values that affect care: no               Additional Information:    JUDE met with pt and mom Eda bedside. Pt was mostly lethargic during assessment. 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 Eda states despite the pacemaker placement in February, pt works hard to remain independent in cares, socializes with friends and works when she can. JUDE talked with Eda about FMLA, short term disability and applying for disability through social security. Eda provided SW with her e-mail, SW will send information requested on SMRT and sliding scale insulin/SSDI.  Initial assessment completed due to high risk readmission score. See details above. Care management will follow for any discharge needs.      ISMAEL Ludwig, DUNCAN  4A & 4E ICU   Pager: [phone]  Phone: [phone]

## 2023-07-21 NOTE — PROGRESS NOTES
[facility], Procedure Note          Extubation:       Silvia Otero  MRN# [id number]   July 20, 2023, 8:28 PM         Patient extubated at: July 20, 2023, 8:28 PM   Supplemental Oxygen: Via face mask at 4 liters per minute   Cough: The cough is weak and non-productive   Secretion Mode: PRN suction with assistance   Secretion Amount: Moderate amount, moderately thick and tan in color   Respiratory Exam:: Breath sounds: equal and clear and good aeration     Location: bilaterally   Skin Exam:: Patient color: natural   Patient Status: Currently appears comfortable, arouses to verbal and arouses to touch   Arterial Blood Gasses: pH Arterial (no units)   Date Value   07/20/2023 7.37     pO2 Arterial (mm Hg)   Date Value   07/20/2023 155 (H)     pCO2 Arterial (mm Hg)   Date Value   07/20/2023 42     Bicarbonate Arterial (mmol/L)   Date Value   07/20/2023 24            Recorded by Jeferson Hickman RT

## 2023-07-22 ENCOUNTER — APPOINTMENT (OUTPATIENT)
Dept: GENERAL RADIOLOGY | Facility: CLINIC | Age: 28
DRG: 219 | End: 2023-07-22
Attending: SURGERY
Payer: COMMERCIAL

## 2023-07-22 LAB
ALBUMIN SERPL BCG-MCNC: 4.5 G/DL (ref 3.5–5.2)
ALP SERPL-CCNC: 63 U/L (ref 35–104)
ALT SERPL W P-5'-P-CCNC: 13 U/L (ref 0–50)
ANION GAP SERPL CALCULATED.3IONS-SCNC: 12 MMOL/L (ref 7–15)
AST SERPL W P-5'-P-CCNC: 37 U/L (ref 0–45)
BILIRUB SERPL-MCNC: 1 MG/DL
BUN SERPL-MCNC: 10.3 MG/DL (ref 6–20)
CA-I BLD-MCNC: 4.6 MG/DL (ref 4.4–5.2)
CALCIUM SERPL-MCNC: 9.5 MG/DL (ref 8.6–10)
CHLORIDE SERPL-SCNC: 97 MMOL/L (ref 98–107)
CREAT SERPL-MCNC: 0.55 MG/DL (ref 0.51–0.95)
DEPRECATED HCO3 PLAS-SCNC: 24 MMOL/L (ref 22–29)
ERYTHROCYTE [DISTWIDTH] IN BLOOD BY AUTOMATED COUNT: 19.6 % (ref 10–15)
GFR SERPL CREATININE-BSD FRML MDRD: >90 ML/MIN/1.73M2
GLUCOSE SERPL-MCNC: 125 MG/DL (ref 70–99)
HCT VFR BLD AUTO: 31.9 % (ref 35–47)
HGB BLD-MCNC: 9.4 G/DL (ref 11.7–15.7)
MAGNESIUM SERPL-MCNC: 1.7 MG/DL (ref 1.7–2.3)
MCH RBC QN AUTO: 25.3 PG (ref 26.5–33)
MCHC RBC AUTO-ENTMCNC: 29.5 G/DL (ref 31.5–36.5)
MCV RBC AUTO: 86 FL (ref 78–100)
MDC_IDC_LEAD_IMPLANT_DT: NORMAL
MDC_IDC_LEAD_LOCATION: NORMAL
MDC_IDC_LEAD_LOCATION_DETAIL_1: NORMAL
MDC_IDC_LEAD_MFG: NORMAL
MDC_IDC_LEAD_MODEL: NORMAL
MDC_IDC_LEAD_POLARITY_TYPE: NORMAL
MDC_IDC_LEAD_SERIAL: NORMAL
MDC_IDC_MSMT_BATTERY_DTM: NORMAL
MDC_IDC_MSMT_BATTERY_REMAINING_LONGEVITY: 63 MO
MDC_IDC_MSMT_BATTERY_STATUS: NORMAL
MDC_IDC_MSMT_BATTERY_VOLTAGE: 3.01 V
MDC_IDC_MSMT_LEADCHNL_RV_IMPEDANCE_VALUE: 200 OHM
MDC_IDC_MSMT_LEADCHNL_RV_PACING_THRESHOLD_AMPLITUDE: 2.25 V
MDC_IDC_MSMT_LEADCHNL_RV_PACING_THRESHOLD_PULSEWIDTH: 1 MS
MDC_IDC_PG_IMPLANT_DTM: NORMAL
MDC_IDC_PG_MFG: NORMAL
MDC_IDC_PG_MODEL: NORMAL
MDC_IDC_PG_SERIAL: NORMAL
MDC_IDC_PG_TYPE: NORMAL
MDC_IDC_SESS_CLINIC_NAME: NORMAL
MDC_IDC_SESS_DTM: NORMAL
MDC_IDC_SESS_TYPE: NORMAL
MDC_IDC_SET_BRADY_HYSTRATE: 60 {BEATS}/MIN
MDC_IDC_SET_BRADY_LOWRATE: 70 {BEATS}/MIN
MDC_IDC_SET_BRADY_MAX_SENSOR_RATE: 130 {BEATS}/MIN
MDC_IDC_SET_BRADY_MODE: NORMAL
MDC_IDC_SET_BRADY_NIGHT_RATE: NORMAL
MDC_IDC_SET_LEADCHNL_RV_PACING_AMPLITUDE: 2.5 V
MDC_IDC_SET_LEADCHNL_RV_PACING_CAPTURE_MODE: NORMAL
MDC_IDC_SET_LEADCHNL_RV_PACING_POLARITY: NORMAL
MDC_IDC_SET_LEADCHNL_RV_PACING_PULSEWIDTH: 1 MS
MDC_IDC_SET_LEADCHNL_RV_SENSING_POLARITY: NORMAL
MDC_IDC_SET_LEADCHNL_RV_SENSING_SENSITIVITY: 2 MV
MDC_IDC_STAT_BRADY_RV_PERCENT_PACED: 100 %
PHOSPHATE SERPL-MCNC: 2.7 MG/DL (ref 2.5–4.5)
PLATELET # BLD AUTO: 165 10E3/UL (ref 150–450)
POTASSIUM SERPL-SCNC: 4.1 MMOL/L (ref 3.4–5.3)
PROT SERPL-MCNC: 7.5 G/DL (ref 6.4–8.3)
RBC # BLD AUTO: 3.72 10E6/UL (ref 3.8–5.2)
SODIUM SERPL-SCNC: 133 MMOL/L (ref 136–145)
WBC # BLD AUTO: 14.5 10E3/UL (ref 4–11)

## 2023-07-22 PROCEDURE — 85014 HEMATOCRIT: CPT | Performed by: STUDENT IN AN ORGANIZED HEALTH CARE EDUCATION/TRAINING PROGRAM

## 2023-07-22 PROCEDURE — 80053 COMPREHEN METABOLIC PANEL: CPT | Performed by: STUDENT IN AN ORGANIZED HEALTH CARE EDUCATION/TRAINING PROGRAM

## 2023-07-22 PROCEDURE — 84100 ASSAY OF PHOSPHORUS: CPT | Performed by: STUDENT IN AN ORGANIZED HEALTH CARE EDUCATION/TRAINING PROGRAM

## 2023-07-22 PROCEDURE — 200N000002 HC R&B ICU UMMC

## 2023-07-22 PROCEDURE — 250N000013 HC RX MED GY IP 250 OP 250 PS 637: Performed by: STUDENT IN AN ORGANIZED HEALTH CARE EDUCATION/TRAINING PROGRAM

## 2023-07-22 PROCEDURE — 36415 COLL VENOUS BLD VENIPUNCTURE: CPT | Performed by: STUDENT IN AN ORGANIZED HEALTH CARE EDUCATION/TRAINING PROGRAM

## 2023-07-22 PROCEDURE — 250N000011 HC RX IP 250 OP 636: Performed by: STUDENT IN AN ORGANIZED HEALTH CARE EDUCATION/TRAINING PROGRAM

## 2023-07-22 PROCEDURE — 99233 SBSQ HOSP IP/OBS HIGH 50: CPT

## 2023-07-22 PROCEDURE — 71045 X-RAY EXAM CHEST 1 VIEW: CPT | Mod: 26 | Performed by: RADIOLOGY

## 2023-07-22 PROCEDURE — 71045 X-RAY EXAM CHEST 1 VIEW: CPT

## 2023-07-22 PROCEDURE — 82330 ASSAY OF CALCIUM: CPT | Performed by: STUDENT IN AN ORGANIZED HEALTH CARE EDUCATION/TRAINING PROGRAM

## 2023-07-22 PROCEDURE — 83735 ASSAY OF MAGNESIUM: CPT | Performed by: STUDENT IN AN ORGANIZED HEALTH CARE EDUCATION/TRAINING PROGRAM

## 2023-07-22 RX ADMIN — Medication 10 MG: at 22:26

## 2023-07-22 RX ADMIN — ASPIRIN 81 MG CHEWABLE TABLET 81 MG: 81 TABLET CHEWABLE at 07:39

## 2023-07-22 RX ADMIN — HEPARIN SODIUM 5000 UNITS: 5000 INJECTION, SOLUTION INTRAVENOUS; SUBCUTANEOUS at 07:38

## 2023-07-22 RX ADMIN — SENNOSIDES AND DOCUSATE SODIUM 1 TABLET: 50; 8.6 TABLET ORAL at 07:38

## 2023-07-22 RX ADMIN — OXYCODONE HYDROCHLORIDE 5 MG: 5 TABLET ORAL at 03:33

## 2023-07-22 RX ADMIN — GABAPENTIN 100 MG: 100 CAPSULE ORAL at 19:58

## 2023-07-22 RX ADMIN — PANTOPRAZOLE SODIUM 40 MG: 40 TABLET, DELAYED RELEASE ORAL at 07:39

## 2023-07-22 RX ADMIN — ACETAMINOPHEN 975 MG: 325 TABLET, FILM COATED ORAL at 16:07

## 2023-07-22 RX ADMIN — ACETAMINOPHEN 975 MG: 325 TABLET, FILM COATED ORAL at 07:38

## 2023-07-22 RX ADMIN — GABAPENTIN 100 MG: 100 CAPSULE ORAL at 16:07

## 2023-07-22 RX ADMIN — GABAPENTIN 100 MG: 100 CAPSULE ORAL at 07:39

## 2023-07-22 RX ADMIN — POLYETHYLENE GLYCOL 3350 17 G: 17 POWDER, FOR SOLUTION ORAL at 07:38

## 2023-07-22 ASSESSMENT — ACTIVITIES OF DAILY LIVING (ADL)
ADLS_ACUITY_SCORE: 26
ADLS_ACUITY_SCORE: 23
ADLS_ACUITY_SCORE: 23
ADLS_ACUITY_SCORE: 26

## 2023-07-22 NOTE — PROGRESS NOTES
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 Pacemaker placed in February of 2023 for 3rd degree heart block  Presents to 7/20 [facility] for redo sternotomy, explantation of mechanical tricuspid valve and replacement with bioprosthetic mitral valve in the tricuspid position by Dr. Nowak. Had permanent pacemaker placed and temporary pacemaker wires which are sewed in and will need to be cut when ready.          CO-MORBIDITIES:         Patient Active Problem List   Diagnosis     Mural thrombus of heart     History of tricuspid valve replacement with mechanical valve     Atrial flutter, unspecified type (H)     Chest pressure     Iron deficiency anemia due to chronic blood loss     Prosthetic tricuspid valve stenosis          Changes/update today:  - Remove bilateral pleural drains  - Regular diet  - Transfer to floor  - Pacer wires cut     PLAN:  Neuro/ pain/ sedation:  - Monitor neurological status. Notify the MD for any acute changes in exam.  #Acute postoperative pain  - Scheduled: Tylenol  - PRN: Tylenol, oxycodone, Dilaudid       Pulmonary care:   # No acute concerns   - Supplemental oxygen to maintain SpO2  92%  - Encourage IS q15-30 minutes when awake.  - CXR c/f post-op atelectasis    Plan: Aggressive pulmonary toilet with IS today. Plan for OOB and ambulation added encouragement needed    Cardiovascular:    #Hx of atrial myxoma  s/p resection   #Hx of prior Triscuspid revision (2001) and replacement (2016)  #3rd Degree AV block s/p pacemaker 2023  #s/p redo sternotomy, explantation of mechanical valve and replacement with bioprosthetic mitral valve in tricuspid position by Dr. Nowak 7/20/22   - Not on any pressors immediatly post op  - Goal MAP >65, SBP <140,  monitor hemodynamics  - Hold Statin   - Hold BB   - Meds: ASA 81 mg  - Permanent pacemaker inplace; 100% paced at 70 bpm     Plan: Patient now has bioprosthetic valve; will not need anticoagulation per Dr. Nowak; ASA only     GI care / Nutrition:   # Obesity  # Constipation  Poor PO intake  - 2gm Na+ diet; regular  - PPI  - Bowel regimen: MiraLAX, senna, and Doculax suppository ordered     Renal / Fluids / Electrolytes:   BL creat appears to be ~ 0.8  - Strict I/O, daily weights, avoid/limit nephrotoxins  - Replete lytes PRN per protocol     Endocrine:    #Stress hyperglycemia  Preop A1c 5.2  - Post-op blood glucose checks  - Goal BG <180 for optimal healing     ID / Antibiotics:  # Leukocytosis  Patient has increased WBC to 16 today. CXR concerning for post-op atelectasis. Patient has had limited mobility post-op r/t back pain and refusal to ambulate. Plan for OOB, ambulation, and aggressive pulmonary toilet   Bisi-op antibiotics recieved  - Monitor fever curve, WBC, and inflammatory markers as appropriate  - WBC 2.9 preoperatively      Heme:     #Acute on Chronic Anemia  #Hx of Pancytopenia   No s/sx active bleeding  - CBC   - Hgb goal > 7.0  - Hemoglobin 10.5 preoperatively      MSK / Skin:  #Sternotomy  #Surgical Incision  - Sternal precautions, postop incision management protocol  - PT/OT/CR     Prophylaxis:     - Mechanical DVT ppx  - Chemical DVT ppx: start SQH tomorrow  - PPI     Lines / Tubes / Drains:  - Mediastinal tubes x 2  - PIV     Disposition:  - Transfer to floor when bed available      Patient seen and findings/plan discussed with medical ICU staff, Dr. Jordan.  Time spent with patient 35 min    PORSCHE Phipps CNP      ====================================  INTERVAL HISTORY:   NAEON    OBJECTIVE:   1. VITAL SIGNS:   Temp:  [98.1  F (36.7  C)-98.8  F (37.1  C)] 98.8  F (37.1  C)  Pulse:  [70-93] 76  Resp:  [16-31] 31  BP: (118-124)/(58-70) 124/65  MAP:  [80 mmHg-97 mmHg] 97 mmHg  Arterial Line  BP: (113-128)/(60-77) 128/77  SpO2:  [91 %-100 %] 91 %  Resp: (!) 31    2. INTAKE/ OUTPUT:   I/O last 3 completed shifts:  In: 600 [P.O.:480; I.V.:120]  Out: 1495 [Urine:1225; Chest Tube:270]    3. PHYSICAL EXAMINATION:    GEN: not in distress  EYES: PERRL, Anicteric sclera.   HEENT:  Normocephalic, atraumatic, trachea midline, Pupils PERRLA  CV: RRR, no gallops, rubs, or murmurs  PULM/CHEST: Clear breath sounds bilateral upper and rhonchi bilateral lower lobes without wheeze, symmetric chest rise  GI: normal bowel sounds, soft, non-tender, no rebound tenderness or guarding, no masses  : Voids spontaneously  EXTREMITIES: No peripheral edema, moving all extremities, peripheral pulses intact  NEURO: Cranial nerves II-XII grossly intact, no motor-sensory deficits noted  SKIN: Sternal incision well approximated, natural skin color and no edema  PSYCH:  Affect: appropriate and withdrawn       4. LABS:   Arterial Blood Gases   Recent Labs   Lab 07/21/23 0329 07/20/23 1938 07/20/23  1519 07/20/23  1409   PH 7.39 7.37 7.45 7.41   PCO2 39 42 34* 36   PO2 64* 155* 199* 227*   HCO3 24 24 24 23     Complete Blood Count   Recent Labs   Lab 07/21/23  0329 07/20/23 2202 07/20/23  1518 07/20/23  1409 07/20/23  1304 07/20/23  1300   WBC 11.0 8.6 5.6  --   --  14.9*   HGB 8.6* 8.2* 7.5* 7.6*   < > 8.4*    123* 97*  --   --  103*    < > = values in this interval not displayed.     Basic Metabolic Panel  Recent Labs   Lab 07/21/23  2335 07/21/23 0329 07/20/23 2202 07/20/23  1949 07/20/23  1519 07/20/23  1518 07/20/23  1409 07/20/23  0818 07/15/23  1459   NA  --  136 141  --   --  141 140   < > 141   POTASSIUM  --  4.8 4.3  4.3  --   --  4.0 3.3*   < > 3.8   CHLORIDE  --  102 108*  --   --  109*  --   --  109*   CO2  --  21* 22  --   --  21*  --   --  22   BUN  --  11.3 12.1  --   --  11.7  --   --  18   CR  --  0.60 0.62  --   --  0.67  --   --  0.80   * 147* 144* 143*   < > 103* 107*   < > 80    < > = values in  this interval not displayed.     Liver Function Tests  Recent Labs   Lab 07/21/23  0329 07/20/23  2202 07/20/23  1518 07/20/23  1300 07/20/23  0602 07/15/23  1459   AST 50* 47* 39  --   --  24   ALT 17 16 15  --   --  18   ALKPHOS 53 51 47  --   --  81   BILITOTAL 1.1 0.9 0.9  --   --  0.5   ALBUMIN 4.5 4.3 3.9  --   --  4.6   INR  --   --  1.70* 1.69* 1.26* 3.81*     Coagulation Profile  Recent Labs   Lab 07/20/23  1518 07/20/23  1300 07/20/23  0602 07/15/23  1459   INR 1.70* 1.69* 1.26* 3.81*   PTT 38 33  --  44*       5. RADIOLOGY:   No results found for this or any previous visit (from the past 24 hour(s)).

## 2023-07-23 ENCOUNTER — APPOINTMENT (OUTPATIENT)
Dept: GENERAL RADIOLOGY | Facility: CLINIC | Age: 28
DRG: 219 | End: 2023-07-23
Payer: COMMERCIAL

## 2023-07-23 ENCOUNTER — APPOINTMENT (OUTPATIENT)
Dept: GENERAL RADIOLOGY | Facility: CLINIC | Age: 28
DRG: 219 | End: 2023-07-23
Attending: SURGERY
Payer: COMMERCIAL

## 2023-07-23 LAB
ALBUMIN SERPL BCG-MCNC: 4.2 G/DL (ref 3.5–5.2)
ALP SERPL-CCNC: 74 U/L (ref 35–104)
ALT SERPL W P-5'-P-CCNC: 10 U/L (ref 0–50)
ANION GAP SERPL CALCULATED.3IONS-SCNC: 12 MMOL/L (ref 7–15)
AST SERPL W P-5'-P-CCNC: 26 U/L (ref 0–45)
BILIRUB SERPL-MCNC: 0.7 MG/DL
BUN SERPL-MCNC: 9.2 MG/DL (ref 6–20)
CA-I BLD-MCNC: 4.4 MG/DL (ref 4.4–5.2)
CALCIUM SERPL-MCNC: 9.2 MG/DL (ref 8.6–10)
CHLORIDE SERPL-SCNC: 98 MMOL/L (ref 98–107)
CREAT SERPL-MCNC: 0.48 MG/DL (ref 0.51–0.95)
DEPRECATED HCO3 PLAS-SCNC: 25 MMOL/L (ref 22–29)
ERYTHROCYTE [DISTWIDTH] IN BLOOD BY AUTOMATED COUNT: 19.1 % (ref 10–15)
GFR SERPL CREATININE-BSD FRML MDRD: >90 ML/MIN/1.73M2
GLUCOSE SERPL-MCNC: 107 MG/DL (ref 70–99)
HCT VFR BLD AUTO: 30 % (ref 35–47)
HGB BLD-MCNC: 8.8 G/DL (ref 11.7–15.7)
HGB BLD-MCNC: 8.8 G/DL (ref 11.7–15.7)
MAGNESIUM SERPL-MCNC: 1.8 MG/DL (ref 1.7–2.3)
MCH RBC QN AUTO: 24.9 PG (ref 26.5–33)
MCHC RBC AUTO-ENTMCNC: 29.3 G/DL (ref 31.5–36.5)
MCV RBC AUTO: 85 FL (ref 78–100)
PHOSPHATE SERPL-MCNC: 2.2 MG/DL (ref 2.5–4.5)
PLATELET # BLD AUTO: 186 10E3/UL (ref 150–450)
POTASSIUM SERPL-SCNC: 3.9 MMOL/L (ref 3.4–5.3)
PROT SERPL-MCNC: 7.4 G/DL (ref 6.4–8.3)
RADIOLOGIST FLAGS: ABNORMAL
RBC # BLD AUTO: 3.54 10E6/UL (ref 3.8–5.2)
SODIUM SERPL-SCNC: 135 MMOL/L (ref 136–145)
WBC # BLD AUTO: 10.6 10E3/UL (ref 4–11)

## 2023-07-23 PROCEDURE — 36415 COLL VENOUS BLD VENIPUNCTURE: CPT | Performed by: STUDENT IN AN ORGANIZED HEALTH CARE EDUCATION/TRAINING PROGRAM

## 2023-07-23 PROCEDURE — 71045 X-RAY EXAM CHEST 1 VIEW: CPT | Mod: 77

## 2023-07-23 PROCEDURE — 250N000013 HC RX MED GY IP 250 OP 250 PS 637: Performed by: SURGERY

## 2023-07-23 PROCEDURE — 99418 PROLNG IP/OBS E/M EA 15 MIN: CPT

## 2023-07-23 PROCEDURE — 250N000013 HC RX MED GY IP 250 OP 250 PS 637

## 2023-07-23 PROCEDURE — 83735 ASSAY OF MAGNESIUM: CPT | Performed by: STUDENT IN AN ORGANIZED HEALTH CARE EDUCATION/TRAINING PROGRAM

## 2023-07-23 PROCEDURE — 250N000013 HC RX MED GY IP 250 OP 250 PS 637: Performed by: STUDENT IN AN ORGANIZED HEALTH CARE EDUCATION/TRAINING PROGRAM

## 2023-07-23 PROCEDURE — 71045 X-RAY EXAM CHEST 1 VIEW: CPT | Mod: 26 | Performed by: RADIOLOGY

## 2023-07-23 PROCEDURE — 80053 COMPREHEN METABOLIC PANEL: CPT | Performed by: STUDENT IN AN ORGANIZED HEALTH CARE EDUCATION/TRAINING PROGRAM

## 2023-07-23 PROCEDURE — 250N000013 HC RX MED GY IP 250 OP 250 PS 637: Performed by: PHYSICIAN ASSISTANT

## 2023-07-23 PROCEDURE — 84100 ASSAY OF PHOSPHORUS: CPT | Performed by: STUDENT IN AN ORGANIZED HEALTH CARE EDUCATION/TRAINING PROGRAM

## 2023-07-23 PROCEDURE — 99233 SBSQ HOSP IP/OBS HIGH 50: CPT | Mod: FS

## 2023-07-23 PROCEDURE — 250N000011 HC RX IP 250 OP 636: Performed by: STUDENT IN AN ORGANIZED HEALTH CARE EDUCATION/TRAINING PROGRAM

## 2023-07-23 PROCEDURE — 85027 COMPLETE CBC AUTOMATED: CPT | Performed by: STUDENT IN AN ORGANIZED HEALTH CARE EDUCATION/TRAINING PROGRAM

## 2023-07-23 PROCEDURE — 82330 ASSAY OF CALCIUM: CPT | Performed by: STUDENT IN AN ORGANIZED HEALTH CARE EDUCATION/TRAINING PROGRAM

## 2023-07-23 PROCEDURE — 85018 HEMOGLOBIN: CPT | Performed by: STUDENT IN AN ORGANIZED HEALTH CARE EDUCATION/TRAINING PROGRAM

## 2023-07-23 PROCEDURE — 214N000001 HC R&B CCU UMMC

## 2023-07-23 PROCEDURE — 71045 X-RAY EXAM CHEST 1 VIEW: CPT

## 2023-07-23 RX ORDER — MAGNESIUM OXIDE 400 MG/1
400 TABLET ORAL EVERY 4 HOURS
Status: COMPLETED | OUTPATIENT
Start: 2023-07-23 | End: 2023-07-23

## 2023-07-23 RX ORDER — ACETAMINOPHEN 325 MG/1
975 TABLET ORAL EVERY 8 HOURS
Status: DISCONTINUED | OUTPATIENT
Start: 2023-07-23 | End: 2023-07-26 | Stop reason: HOSPADM

## 2023-07-23 RX ORDER — GABAPENTIN 100 MG/1
100 CAPSULE ORAL 3 TIMES DAILY
Status: DISCONTINUED | OUTPATIENT
Start: 2023-07-23 | End: 2023-07-26 | Stop reason: HOSPADM

## 2023-07-23 RX ORDER — ASPIRIN 81 MG/1
81 TABLET ORAL DAILY
Status: DISCONTINUED | OUTPATIENT
Start: 2023-07-24 | End: 2023-07-26 | Stop reason: HOSPADM

## 2023-07-23 RX ADMIN — POTASSIUM & SODIUM PHOSPHATES POWDER PACK 280-160-250 MG 1 PACKET: 280-160-250 PACK at 09:25

## 2023-07-23 RX ADMIN — SENNOSIDES AND DOCUSATE SODIUM 1 TABLET: 50; 8.6 TABLET ORAL at 19:58

## 2023-07-23 RX ADMIN — POTASSIUM & SODIUM PHOSPHATES POWDER PACK 280-160-250 MG 1 PACKET: 280-160-250 PACK at 13:04

## 2023-07-23 RX ADMIN — GABAPENTIN 100 MG: 100 CAPSULE ORAL at 19:58

## 2023-07-23 RX ADMIN — Medication 5 MG: at 19:58

## 2023-07-23 RX ADMIN — OXYCODONE HYDROCHLORIDE 10 MG: 10 TABLET ORAL at 19:58

## 2023-07-23 RX ADMIN — MAGNESIUM OXIDE TAB 400 MG (241.3 MG ELEMENTAL MG) 400 MG: 400 (241.3 MG) TAB at 16:33

## 2023-07-23 RX ADMIN — GABAPENTIN 100 MG: 100 CAPSULE ORAL at 08:21

## 2023-07-23 RX ADMIN — MAGNESIUM OXIDE TAB 400 MG (241.3 MG ELEMENTAL MG) 400 MG: 400 (241.3 MG) TAB at 11:16

## 2023-07-23 RX ADMIN — HEPARIN SODIUM 5000 UNITS: 5000 INJECTION, SOLUTION INTRAVENOUS; SUBCUTANEOUS at 23:21

## 2023-07-23 RX ADMIN — HEPARIN SODIUM 5000 UNITS: 5000 INJECTION, SOLUTION INTRAVENOUS; SUBCUTANEOUS at 08:24

## 2023-07-23 RX ADMIN — METHOCARBAMOL 750 MG: 750 TABLET ORAL at 11:16

## 2023-07-23 RX ADMIN — OXYCODONE HYDROCHLORIDE 5 MG: 5 TABLET ORAL at 12:15

## 2023-07-23 RX ADMIN — POTASSIUM & SODIUM PHOSPHATES POWDER PACK 280-160-250 MG 1 PACKET: 280-160-250 PACK at 16:32

## 2023-07-23 RX ADMIN — ACETAMINOPHEN 975 MG: 325 TABLET, FILM COATED ORAL at 16:32

## 2023-07-23 RX ADMIN — PANTOPRAZOLE SODIUM 40 MG: 40 TABLET, DELAYED RELEASE ORAL at 08:22

## 2023-07-23 RX ADMIN — HEPARIN SODIUM 5000 UNITS: 5000 INJECTION, SOLUTION INTRAVENOUS; SUBCUTANEOUS at 16:33

## 2023-07-23 RX ADMIN — ACETAMINOPHEN 975 MG: 325 TABLET, FILM COATED ORAL at 08:22

## 2023-07-23 RX ADMIN — GABAPENTIN 100 MG: 100 CAPSULE ORAL at 13:04

## 2023-07-23 RX ADMIN — ASPIRIN 81 MG CHEWABLE TABLET 81 MG: 81 TABLET CHEWABLE at 08:22

## 2023-07-23 ASSESSMENT — ACTIVITIES OF DAILY LIVING (ADL)
ADLS_ACUITY_SCORE: 26
ADLS_ACUITY_SCORE: 23
ADLS_ACUITY_SCORE: 23
ADLS_ACUITY_SCORE: 26
ADLS_ACUITY_SCORE: 23
ADLS_ACUITY_SCORE: 26
ADLS_ACUITY_SCORE: 23
ADLS_ACUITY_SCORE: 26

## 2023-07-23 NOTE — PROGRESS NOTES
"RN from [id number]  Major Shift Events:   A&O, flat affect, needs lots of encouragement with cares  100% V paced w PPM, Bps WNL. CTs w minimal OP.  5L NC applied this am per orders for increasing L pneumo.   Eating very little, encouraged to order food and eat.   Voiding in commode. No BM - pt refusing bowel meds, telling RN that \"they make me more constipated\", educated as able.  Sched tylenol for pain, refusing other pain meds at this time.  Plan: Transferrd up to floor - OK to go unmonitored per CVTS fellow Van.   For vital signs and complete assessments, please see documentation flowsheets.   "

## 2023-07-23 NOTE — PHARMACY-ADMISSION MEDICATION HISTORY
Pharmacist Admission Medication History    Admission medication history is complete. The information provided in this note is only as accurate as the sources available at the time of the update.    Medication reconciliation/reorder completed by provider prior to medication history? No    Information Source(s): Patient and med history completed by PAC PharmD prior to admission (see note dated 7/3/2023); RN updated last doses pre-op via N/A    Pertinent Information: none    Changes made to PTA medication list:    Added: None    Deleted: None    Changed: None    Allergies reviewed with patient and updates made in EHR: yes    Medication History Completed By: Ana Fan PharmD 7/23/2023 10:50 AM    Prior to Admission medications    Medication Sig Last Dose Taking? Auth Provider Long Term End Date   aspirin (ASA) 81 MG EC tablet Take 1 tablet (81 mg) by mouth daily 7/19/2023 Yes Laure Humphrey MD     enoxaparin ANTICOAGULANT (LOVENOX) 80 MG/0.8ML syringe Inject 0.8 mLs (80 mg) Subcutaneous every 12 hours Before and after procedure as directed 7/19/2023 Yes Bryce Nowak MD     ferrous sulfate (FEROSUL) 325 (65 Fe) MG tablet Take 325 mg by mouth daily (with breakfast) 7/19/2023 Yes Reported, Patient     warfarin ANTICOAGULANT (COUMADIN) 10 MG tablet Take 15 mg every day. Or as directed.  Patient taking differently: Take 15 mg by mouth every evening Or as directed. 7/14/2023  Eduardo Araiza NP

## 2023-07-23 NOTE — PROGRESS NOTES
Admission    Diagnosis: Prosthetic tricuspid valve stenosis   Admitted from:    Via: Bed  Accompanied by: RN  Belongings: Placed in closet; valuables sent home with family, declined sending any items to security.  Admission Profile: Complete  Teaching: orientation to unit, call don't fall, use of console, meal times, visiting hours, when to call for the RN (angina/sob/dizziness, etc.), and enforced importance of safety   Access: PIV  Telemetry: Placed on patient  Height/Weight: Complete  Skin check completed by Silvia Powers RN and Casey Powers RN. Findings: sternal incision approximated and ELIDA, CT dressing over abd, CDI (2 meds to -20 suction). No other skin concerns.

## 2023-07-23 NOTE — PROGRESS NOTES
CV ICU PROGRESS NOTE  07/23/2023      Date of Service (when I saw the patient): 07/23/2023    ASSESSMENT: Silvia Otero is a 27 year old female with PMH of anemia,  hx of an atrial myxoma operated on in [place] c/b damage to the tricuspid valve requiring replacement in 2001 and a revision in 2016. Pacemaker placed in February of 2023 for 3rd degree heart block  Presents to 7/20 [facility] for redo sternotomy, explantation of mechanical tricuspid valve and replacement with bioprosthetic mitral valve in the tricuspid position by Dr. Nowak.            CO-MORBIDITIES:         Patient Active Problem List   Diagnosis     Mural thrombus of heart     History of tricuspid valve replacement with mechanical valve     Atrial flutter, unspecified type (H)     Chest pressure     Iron deficiency anemia due to chronic blood loss     Prosthetic tricuspid valve stenosis          Changes/update today:  - Bilateral apical PNTX present   - Plan for non-titratable 5L NC  - Transfer to floor when bed available  - Daily CXR      PLAN:  Neuro/ pain/ sedation:  - Monitor neurological status. Notify the MD for any acute changes in exam.  #Acute postoperative pain  - Scheduled: Tylenol  - PRN: Tylenol, oxycodone, Dilaudid       Pulmonary care:   # Bilateral apical PNTX  Patient developed bilateral pneumothoraces post bilateral pleural tube removal 7/22. Patient does not endorse SOB and respiratory effort unremarkable. Plan for daily CXR no need for pleural tube placement at this time.  - Supplemental oxygen to maintain SpO2  92%  - 5L NC ordered non-titratable for PNTHX concerns  - Encourage IS q15-30 minutes when awake.     Plan: Aggressive pulmonary toilet with IS. Plan for OOB and ambulation added encouragement needed. Daily CXR     Cardiovascular:    # Hx of atrial myxoma  s/p resection   # Hx of prior Triscuspid revision (2001) and replacement (2016)  # 3rd Degree AV block s/p pacemaker 2023 (VVIR 70 bpm [employer])  #  s/p redo sternotomy, explantation of mechanical valve and replacement with bioprosthetic mitral valve in tricuspid position by Dr. Nowak 7/20/22   - Not on any pressors immediatly post op  - Goal MAP >65, SBP <140, monitor hemodynamics  - Hold Statin   - Hold BB   - Meds: ASA 81 mg  - Permanent pacemaker inplace; 100% paced at 70 bpm     Plan: Patient now has bioprosthetic valve; will not need anticoagulation per Dr. Nowak; ASA only     GI care / Nutrition:   # Obesity  # Constipation  Poor PO intake and patient has been refusing bowel regimen. Patient given added encouragement and now agreeable to take scheduled bowel regimen  - regular  - PPI  - Bowel regimen: MiraLAX, senna, and Doculax suppository ordered     Renal / Fluids / Electrolytes:   BL creat appears to be ~ 0.8  - Strict I/O, daily weights, avoid/limit nephrotoxins  - Replete lytes PRN per protocol     Endocrine:    #Stress hyperglycemia  Preop A1c 5.2  - Post-op blood glucose checks  - Goal BG <180 for optimal healing     ID / Antibiotics:  # Leukocytosis - resolved  Patient has increased WBC to 14 7/24 I/S for concerns for post-op atelectasis; now improved. Patient continues to work with PT/OT with OOB and ambulation with continued aggressive pulmonary toilet   Bisi-op antibiotics recieved  - Monitor fever curve, WBC, and inflammatory markers as appropriate  - WBC 2.9 preoperatively      Heme:     #Acute on Chronic Anemia  #Hx of Pancytopenia   No s/sx active bleeding  - CBC   - Hgb goal > 7.0  - Hemoglobin 10.5 preoperatively      MSK / Skin:  #Sternotomy  #Surgical Incision  Sternal incision pain ongoing; patient continues to complain of chest pain with inspiration that is focused to anterior chest/sternal incision. Patient has also been refusing PRN pain regimen. Patient encouraged to take PRN regimen given she is only POD # 3.  - Sternal precautions, postop incision management protocol  - PT/OT/CR     Prophylaxis:     - Mechanical DVT ppx  -  Chemical DVT ppx: Hep SQ  - PPI     Lines / Tubes / Drains:  - Mediastinal tubes x 2  - PIV  - temp pacer wires cut 7/22  - Bilateral pleural tubes removed 7/22     Disposition:  - Transfer to floor when bed available       Patient seen and findings/plan discussed with medical ICU staff, Dr. Jordan.  Time spent with patient 35 min    PORSCHE Phipps CNP        ====================================  INTERVAL HISTORY:   Bilateral PNTHX ongoing will continue to follow daily CXR; no need for pleural tube at this time. Patient needs to have BM and now agreeable to taking bowel regimen. Sternal incision pain ongoing; continue to encourage PRN pain regimen    OBJECTIVE:   1. VITAL SIGNS:   Temp:  [97.5  F (36.4  C)-98.7  F (37.1  C)] 98.2  F (36.8  C)  Pulse:  [70-87] 86  Resp:  [17-29] 18  BP: (117-133)/(62-83) 126/83  SpO2:  [94 %-99 %] 96 %  Resp: 18    2. INTAKE/ OUTPUT:   I/O last 3 completed shifts:  In: 480 [P.O.:480]  Out: 840 [Urine:750; Chest Tube:90]    3. PHYSICAL EXAMINATION:    GEN: not in distress, withdrawn during conversation. Continued need to promote conversation. Sternal chest pain with inspiration 6/10  EYES: PERRL, Anicteric sclera.   HEENT:  Normocephalic, atraumatic, trachea midline, Pupils PERRLA  CV: RRR, no gallops, rubs, or murmurs  PULM/CHEST: Clear breath sounds bilaterally without rhonchi, crackles or wheeze, symmetric chest rise  GI: normal bowel sounds, soft, non-tender, no rebound tenderness or guarding, no masses  : Voiding spontaneously  EXTREMITIES: No peripheral edema, moving all extremities, peripheral pulses intact  NEURO: Cranial nerves II-XII grossly intact, no motor-sensory deficits noted  SKIN: No rashes, sores or ulcerations         4. LABS:   Arterial Blood Gases   Recent Labs   Lab 07/21/23  0329 07/20/23  1938 07/20/23  1519 07/20/23  1409   PH 7.39 7.37 7.45 7.41   PCO2 39 42 34* 36   PO2 64* 155* 199* 227*   HCO3 24 24 24 23     Complete Blood Count   Recent Labs    Lab 07/23/23  0735 07/22/23  0639 07/21/23 0329 07/20/23 2202   WBC 10.6 14.5* 11.0 8.6   HGB 8.8* 9.4* 8.6* 8.2*    165 151 123*     Basic Metabolic Panel  Recent Labs   Lab 07/23/23  0735 07/22/23  0639 07/21/23  2335 07/21/23 0329 07/20/23 2202   * 133*  --  136 141   POTASSIUM 3.9 4.1  --  4.8 4.3  4.3   CHLORIDE 98 97*  --  102 108*   CO2 25 24  --  21* 22   BUN 9.2 10.3  --  11.3 12.1   CR 0.48* 0.55  --  0.60 0.62   * 125* 125* 147* 144*     Liver Function Tests  Recent Labs   Lab 07/23/23  0735 07/22/23 0639 07/21/23 0329 07/20/23 2202 07/20/23 1518 07/20/23  1518 07/20/23  1300 07/20/23  0602   AST 26 37 50* 47*   < > 39  --   --    ALT 10 13 17 16   < > 15  --   --    ALKPHOS 74 63 53 51   < > 47  --   --    BILITOTAL 0.7 1.0 1.1 0.9   < > 0.9  --   --    ALBUMIN 4.2 4.5 4.5 4.3   < > 3.9  --   --    INR  --   --   --   --   --  1.70* 1.69* 1.26*    < > = values in this interval not displayed.     Coagulation Profile  Recent Labs   Lab 07/20/23  1518 07/20/23  1300 07/20/23  0602   INR 1.70* 1.69* 1.26*   PTT 38 33  --        5. RADIOLOGY:   Recent Results (from the past 24 hour(s))   XR Chest Port 1 View   Result Value    Radiologist flags New/enlarging small left larger than right apical (Urgent)    Narrative    Exam: XR CHEST PORT 1 VIEW, 7/23/2023 1:57 AM    Indication: chest tubes s/p TVR    Comparison: 7/22/2023    Findings:   Removal of basilar chest tubes. Midline sternotomy wires. Stable  mediastinal drains. Tricuspid valve prosthesis. Stable small left,  trace right pleural effusions and cardiomegaly. New small, left larger  than right pneumothoraces. Perihilar and bibasilar mixed opacities  similar .      Impression    Impression: Removal of basilar chest tubes with new left larger than  right small pneumothoraces. Attention on follow-up. Perihilar and  bibasilar atelectasis/edema.    [Urgent Result: New/enlarging small left larger than right apical  pneumothoraces  after removal of basilar chest tubes.]     Finding was identified on 7/23/2023 2:35 AM.     Attempts were made to reach on call CTS, findings relayed to JITENDRA ho RN  by Dr. Saldaña at 7/23/2023 2:50 AM and verbalized  understanding of the urgent finding.     I have personally reviewed the examination and initial interpretation  and I agree with the findings.    VINI JOYA MD         SYSTEM ID:  [id number]

## 2023-07-24 ENCOUNTER — APPOINTMENT (OUTPATIENT)
Dept: GENERAL RADIOLOGY | Facility: CLINIC | Age: 28
DRG: 219 | End: 2023-07-24
Attending: PHYSICIAN ASSISTANT
Payer: COMMERCIAL

## 2023-07-24 ENCOUNTER — APPOINTMENT (OUTPATIENT)
Dept: OCCUPATIONAL THERAPY | Facility: CLINIC | Age: 28
DRG: 219 | End: 2023-07-24
Attending: SURGERY
Payer: COMMERCIAL

## 2023-07-24 LAB
ALBUMIN SERPL BCG-MCNC: 4 G/DL (ref 3.5–5.2)
ALP SERPL-CCNC: 73 U/L (ref 35–104)
ALT SERPL W P-5'-P-CCNC: 11 U/L (ref 0–50)
ANION GAP SERPL CALCULATED.3IONS-SCNC: 9 MMOL/L (ref 7–15)
AST SERPL W P-5'-P-CCNC: 23 U/L (ref 0–45)
ATRIAL RATE - MUSE: 70 BPM
BILIRUB SERPL-MCNC: 0.6 MG/DL
BUN SERPL-MCNC: 13.5 MG/DL (ref 6–20)
CA-I BLD-MCNC: 4.5 MG/DL (ref 4.4–5.2)
CALCIUM SERPL-MCNC: 9.1 MG/DL (ref 8.6–10)
CHLORIDE SERPL-SCNC: 100 MMOL/L (ref 98–107)
CREAT SERPL-MCNC: 0.47 MG/DL (ref 0.51–0.95)
DEPRECATED HCO3 PLAS-SCNC: 25 MMOL/L (ref 22–29)
DIASTOLIC BLOOD PRESSURE - MUSE: NORMAL MMHG
ERYTHROCYTE [DISTWIDTH] IN BLOOD BY AUTOMATED COUNT: 18.7 % (ref 10–15)
GFR SERPL CREATININE-BSD FRML MDRD: >90 ML/MIN/1.73M2
GLUCOSE SERPL-MCNC: 107 MG/DL (ref 70–99)
HCT VFR BLD AUTO: 28.6 % (ref 35–47)
HGB BLD-MCNC: 8.3 G/DL (ref 11.7–15.7)
INTERPRETATION ECG - MUSE: NORMAL
MAGNESIUM SERPL-MCNC: 1.9 MG/DL (ref 1.7–2.3)
MCH RBC QN AUTO: 25 PG (ref 26.5–33)
MCHC RBC AUTO-ENTMCNC: 29 G/DL (ref 31.5–36.5)
MCV RBC AUTO: 86 FL (ref 78–100)
P AXIS - MUSE: 54 DEGREES
PHOSPHATE SERPL-MCNC: 2.7 MG/DL (ref 2.5–4.5)
PLATELET # BLD AUTO: 179 10E3/UL (ref 150–450)
POTASSIUM SERPL-SCNC: 3.9 MMOL/L (ref 3.4–5.3)
PR INTERVAL - MUSE: NORMAL MS
PROT SERPL-MCNC: 7 G/DL (ref 6.4–8.3)
QRS DURATION - MUSE: 176 MS
QT - MUSE: 444 MS
QTC - MUSE: 512 MS
R AXIS - MUSE: 176 DEGREES
RBC # BLD AUTO: 3.32 10E6/UL (ref 3.8–5.2)
SODIUM SERPL-SCNC: 134 MMOL/L (ref 136–145)
SYSTOLIC BLOOD PRESSURE - MUSE: NORMAL MMHG
T AXIS - MUSE: -62 DEGREES
VENTRICULAR RATE- MUSE: 80 BPM
WBC # BLD AUTO: 6.7 10E3/UL (ref 4–11)

## 2023-07-24 PROCEDURE — 250N000013 HC RX MED GY IP 250 OP 250 PS 637: Performed by: SURGERY

## 2023-07-24 PROCEDURE — 71046 X-RAY EXAM CHEST 2 VIEWS: CPT | Mod: 26 | Performed by: RADIOLOGY

## 2023-07-24 PROCEDURE — 250N000011 HC RX IP 250 OP 636: Performed by: STUDENT IN AN ORGANIZED HEALTH CARE EDUCATION/TRAINING PROGRAM

## 2023-07-24 PROCEDURE — 84100 ASSAY OF PHOSPHORUS: CPT | Performed by: STUDENT IN AN ORGANIZED HEALTH CARE EDUCATION/TRAINING PROGRAM

## 2023-07-24 PROCEDURE — 36415 COLL VENOUS BLD VENIPUNCTURE: CPT | Performed by: STUDENT IN AN ORGANIZED HEALTH CARE EDUCATION/TRAINING PROGRAM

## 2023-07-24 PROCEDURE — 83735 ASSAY OF MAGNESIUM: CPT | Performed by: STUDENT IN AN ORGANIZED HEALTH CARE EDUCATION/TRAINING PROGRAM

## 2023-07-24 PROCEDURE — 250N000013 HC RX MED GY IP 250 OP 250 PS 637: Performed by: STUDENT IN AN ORGANIZED HEALTH CARE EDUCATION/TRAINING PROGRAM

## 2023-07-24 PROCEDURE — 97530 THERAPEUTIC ACTIVITIES: CPT | Mod: GO | Performed by: OCCUPATIONAL THERAPIST

## 2023-07-24 PROCEDURE — 80053 COMPREHEN METABOLIC PANEL: CPT | Performed by: STUDENT IN AN ORGANIZED HEALTH CARE EDUCATION/TRAINING PROGRAM

## 2023-07-24 PROCEDURE — 97535 SELF CARE MNGMENT TRAINING: CPT | Mod: GO | Performed by: OCCUPATIONAL THERAPIST

## 2023-07-24 PROCEDURE — 82330 ASSAY OF CALCIUM: CPT | Performed by: STUDENT IN AN ORGANIZED HEALTH CARE EDUCATION/TRAINING PROGRAM

## 2023-07-24 PROCEDURE — 250N000013 HC RX MED GY IP 250 OP 250 PS 637

## 2023-07-24 PROCEDURE — 85027 COMPLETE CBC AUTOMATED: CPT | Performed by: STUDENT IN AN ORGANIZED HEALTH CARE EDUCATION/TRAINING PROGRAM

## 2023-07-24 PROCEDURE — 71046 X-RAY EXAM CHEST 2 VIEWS: CPT | Mod: 77

## 2023-07-24 PROCEDURE — 214N000001 HC R&B CCU UMMC

## 2023-07-24 PROCEDURE — 250N000013 HC RX MED GY IP 250 OP 250 PS 637: Performed by: PHYSICIAN ASSISTANT

## 2023-07-24 PROCEDURE — 71046 X-RAY EXAM CHEST 2 VIEWS: CPT

## 2023-07-24 RX ORDER — MAGNESIUM OXIDE 400 MG/1
400 TABLET ORAL EVERY 4 HOURS
Status: COMPLETED | OUTPATIENT
Start: 2023-07-24 | End: 2023-07-24

## 2023-07-24 RX ADMIN — HYDROMORPHONE HYDROCHLORIDE 0.4 MG: 0.2 INJECTION, SOLUTION INTRAMUSCULAR; INTRAVENOUS; SUBCUTANEOUS at 15:10

## 2023-07-24 RX ADMIN — OXYCODONE HYDROCHLORIDE 10 MG: 10 TABLET ORAL at 17:51

## 2023-07-24 RX ADMIN — METHOCARBAMOL 750 MG: 750 TABLET ORAL at 12:13

## 2023-07-24 RX ADMIN — OXYCODONE HYDROCHLORIDE 10 MG: 10 TABLET ORAL at 23:41

## 2023-07-24 RX ADMIN — POTASSIUM & SODIUM PHOSPHATES POWDER PACK 280-160-250 MG 1 PACKET: 280-160-250 PACK at 08:17

## 2023-07-24 RX ADMIN — MAGNESIUM OXIDE TAB 400 MG (241.3 MG ELEMENTAL MG) 400 MG: 400 (241.3 MG) TAB at 08:17

## 2023-07-24 RX ADMIN — LIDOCAINE PATCH 4% 1 PATCH: 40 PATCH TOPICAL at 20:05

## 2023-07-24 RX ADMIN — SENNOSIDES AND DOCUSATE SODIUM 1 TABLET: 50; 8.6 TABLET ORAL at 20:05

## 2023-07-24 RX ADMIN — MAGNESIUM OXIDE TAB 400 MG (241.3 MG ELEMENTAL MG) 400 MG: 400 (241.3 MG) TAB at 11:02

## 2023-07-24 RX ADMIN — OXYCODONE HYDROCHLORIDE 10 MG: 10 TABLET ORAL at 12:13

## 2023-07-24 RX ADMIN — METHOCARBAMOL 750 MG: 750 TABLET ORAL at 23:45

## 2023-07-24 RX ADMIN — HEPARIN SODIUM 5000 UNITS: 5000 INJECTION, SOLUTION INTRAVENOUS; SUBCUTANEOUS at 23:41

## 2023-07-24 RX ADMIN — HEPARIN SODIUM 5000 UNITS: 5000 INJECTION, SOLUTION INTRAVENOUS; SUBCUTANEOUS at 15:09

## 2023-07-24 RX ADMIN — OXYCODONE HYDROCHLORIDE 10 MG: 10 TABLET ORAL at 08:15

## 2023-07-24 RX ADMIN — MAGNESIUM HYDROXIDE 30 ML: 2400 SUSPENSION ORAL at 18:07

## 2023-07-24 RX ADMIN — ACETAMINOPHEN 975 MG: 325 TABLET, FILM COATED ORAL at 15:10

## 2023-07-24 RX ADMIN — POTASSIUM & SODIUM PHOSPHATES POWDER PACK 280-160-250 MG 1 PACKET: 280-160-250 PACK at 11:02

## 2023-07-24 RX ADMIN — HEPARIN SODIUM 5000 UNITS: 5000 INJECTION, SOLUTION INTRAVENOUS; SUBCUTANEOUS at 08:17

## 2023-07-24 RX ADMIN — ACETAMINOPHEN 975 MG: 325 TABLET, FILM COATED ORAL at 08:15

## 2023-07-24 RX ADMIN — ASPIRIN 81 MG: 81 TABLET, COATED ORAL at 08:17

## 2023-07-24 RX ADMIN — ACETAMINOPHEN 975 MG: 325 TABLET, FILM COATED ORAL at 23:41

## 2023-07-24 RX ADMIN — PANTOPRAZOLE SODIUM 40 MG: 40 TABLET, DELAYED RELEASE ORAL at 08:17

## 2023-07-24 RX ADMIN — GABAPENTIN 100 MG: 100 CAPSULE ORAL at 15:10

## 2023-07-24 RX ADMIN — GABAPENTIN 100 MG: 100 CAPSULE ORAL at 08:17

## 2023-07-24 RX ADMIN — Medication 5 MG: at 20:05

## 2023-07-24 RX ADMIN — GABAPENTIN 100 MG: 100 CAPSULE ORAL at 20:05

## 2023-07-24 ASSESSMENT — ACTIVITIES OF DAILY LIVING (ADL)
ADLS_ACUITY_SCORE: 26
ADLS_ACUITY_SCORE: 22
ADLS_ACUITY_SCORE: 26

## 2023-07-24 NOTE — PROGRESS NOTES
Cardiovascular Surgery Progress Note  07/24/2023         Assessment and Plan:     ASSESSMENT: Silvia Otero is a 27 year old female with PMH of anemia,  hx of an atrial myxoma operated on in [place] c/b damage to the tricuspid valve requiring replacement in 2001 and a revision in 2016. Pacemaker placed in February of 2023 for 3rd degree heart block  Presents to 7/20 [facility] for redo sternotomy, explantation of mechanical tricuspid valve and replacement with bioprosthetic mitral valve in the tricuspid position by Dr. Nowak.            CO-MORBIDITIES:         Patient Active Problem List   Diagnosis     Mural thrombus of heart     History of tricuspid valve replacement with mechanical valve     Atrial flutter, unspecified type (H)     Chest pressure     Iron deficiency anemia due to chronic blood loss     Prosthetic tricuspid valve stenosis           PLAN:  Neuro/ pain/ sedation:  - Monitor neurological status. Notify the MD for any acute changes in exam.  #Acute postoperative pain  - Scheduled: Tylenol  - PRN: Tylenol, oxycodone, Dilaudid       Pulmonary care:   # Bilateral apical PNTX  Patient developed bilateral pneumothoraces post bilateral pleural tube removal 7/22. Patient does not endorse SOB and respiratory effort unremarkable. Plan for daily CXR no need for pleural tube placement at this time.   - Supplemental oxygen to maintain SpO2  92%  - 5L NC ordered non-titratable for PNTHX concerns  - Encourage IS q15-30 minutes when awake.  - Tiny bi apical pneumos stable on CXR, mediastinal chest tubes became disconnected briefly and were reconnected and placed back to suction by nurse. Follow up CXR stable with no air leak. Mediastinal chest tubes removed in afternoon 7/24. Tubes noted to be clotted at time of removal.       Plan: Aggressive pulmonary toilet with IS. Plan for OOB and ambulation added encouragement needed. Daily CXR     Cardiovascular:    # Hx of atrial myxoma  s/p resection   # Hx of prior Triscuspid  revision (2001) and replacement (2016)  # 3rd Degree AV block s/p pacemaker 2023 (VVIR 70 bpm [employer] MRI)  # s/p redo sternotomy, explantation of mechanical valve and replacement with bioprosthetic mitral valve in tricuspid position by Dr. Nowak 7/20/22   - Not on any pressors immediatly post op  - Goal MAP >65, SBP <140, monitor hemodynamics  - Hold Statin   - Hold BB   - Meds: ASA 81 mg  - Permanent pacemaker inplace; 100% paced at 70 bpm     Plan: Patient now has bioprosthetic valve; will not need anticoagulation per Dr. Nowak; ASA only     GI care / Nutrition:   # Obesity  # Constipation  Poor PO intake and patient has been refusing bowel regimen. Patient given added encouragement and now agreeable to take scheduled bowel regimen  - regular  - PPI  - Bowel regimen: MiraLAX, senna, and Doculax suppository ordered  - Needs to have BM, +flatus      Renal / Fluids / Electrolytes:   BL creat appears to be ~ 0.8  - Strict I/O, daily weights, avoid/limit nephrotoxins  - Replete lytes PRN per protocol     Endocrine:    #Stress hyperglycemia  Preop A1c 5.2  - Post-op blood glucose checks  - Goal BG <180 for optimal healing     ID / Antibiotics:  # Leukocytosis - resolved  Patient has increased WBC to 14 7/24 I/S for concerns for post-op atelectasis; now improved. Patient continues to work with PT/OT with OOB and ambulation with continued aggressive pulmonary toilet   Bisi-op antibiotics recieved  - Monitor fever curve, WBC, and inflammatory markers as appropriate  - WBC WNL      Heme:     #Acute on Chronic Anemia  #Hx of Pancytopenia   No s/sx active bleeding  - CBC   - Hgb goal > 7.0  - Hemoglobin 10.5 preoperatively      MSK / Skin:  #Sternotomy  #Surgical Incision  - Sternal precautions, postop incision management protocol  - PT/OT/CR     Prophylaxis:     - Mechanical DVT ppx  - Chemical DVT ppx: Hep SQ  - PPI     Lines / Tubes / Drains:  - PIV  - temp pacer wires cut 7/22  - Bilateral pleural  "tubes removed 7/22  - Mediastinal chest tubes removed 7/24     Disposition:  - 6C since 7/23  - Likely discharge to home 7/25 pending BM       Patient discussed with Dr. Eliu Manrique PA-C  Cardiothoracic Surgery  Pager [phone]  July 24, 2023          Interval History:     No overnight events. Chest tube noted to come apart by nursing and immediately reconnected. No air leak, CXR stable.   States pain is well managed on current regimen.   Tolerating diet, is passing flatus, no BM. No nausea or vomiting.  Breathing well without complaints.   Working with therapies.  Denies chest pain, palpitations, dizziness, syncopal symptoms, fevers, chills, myalgias, or sternal popping/clicking.         Physical Exam:   Blood pressure 117/73, pulse 70, temperature 98.5  F (36.9  C), temperature source Oral, resp. rate 17, height 1.753 m (5' 9\"), weight 75.1 kg (165 lb 9.1 oz), last menstrual period 07/18/2023, SpO2 97 %, not currently breastfeeding.  Vitals:    07/21/23 0600 07/23/23 0400 07/24/23 0355   Weight: 79.6 kg (175 lb 7.8 oz) 76.8 kg (169 lb 5 oz) 75.1 kg (165 lb 9.1 oz)          Gen: A&Ox4, NAD  Neuro: no focal deficits   CV: paced, normal S1 S2, no murmurs, rubs or gallops. no JVD  Pulm: +CTA, no wheezing or rhonchi, normal breathing on RA  Abd: nondistended, normal BS, soft, nontender  Ext: no peripheral edema  Incision: clean, dry, intact, no erythema, sternum stable  Tubes/drain sites: dressing clean and dry, serosanguinous output, no air leak. 24 hr output 80 mL.   Chest tubes removed without immediate complication. Noted to have significant clot burden at removal.          Data:    Imaging:  Exam: XR CHEST 2 VIEWS, 7/24/2023 8:43 AM     Comparison: Chest radiograph dated 7/23/2023     History: s/p Cardiac Surgery     Findings:  PA and lateral chest radiographs. Postsurgical changes of the chest  from tricuspid valve replacement including intact median sternotomy  wires and mediastinal " surgical clips. Mediastinal chest tubes in  similar position to prior. Trachea is midline. Mediastinum is within  normal limits. Cardiopulmonary silhouette is stably enlarged. Mild  blunting of costophrenic angles. Streaky infrahilar and bibasilar  opacities. Linear opacity in the right lower lung. Stable trace  bilateral apical pneumothoraces.                                                                      Impression:   1. Postsurgical changes of the chest from tricuspid valve replacement.  2. Stable trace bilateral pleural effusions.  3. Stable trace bilateral apical pneumothoraces.  4. Streaky infrahilar and bibasilar opacities, likely atelectasis  and/or pulmonary edema. Linear opacity in the right lower lung likely  represents subsegmental atelectasis.     I have personally reviewed the examination and initial interpretation  and I agree with the findings.     GREGG HAMM MD     Labs:  BMP  Recent Labs   Lab 07/24/23  0631 07/23/23  0735 07/22/23  0639 07/21/23  2335 07/21/23  0329   * 135* 133*  --  136   POTASSIUM 3.9 3.9 4.1  --  4.8   CHLORIDE 100 98 97*  --  102   DENIS 9.1 9.2 9.5  --  9.8   CO2 25 25 24  --  21*   BUN 13.5 9.2 10.3  --  11.3   CR 0.47* 0.48* 0.55  --  0.60   * 107* 125* 125* 147*     CBC  Recent Labs   Lab 07/24/23  0631 07/23/23  1358 07/23/23  0735 07/22/23  0639 07/21/23  0329   WBC 6.7  --  10.6 14.5* 11.0   RBC 3.32*  --  3.54* 3.72* 3.43*   HGB 8.3* 8.8* 8.8* 9.4* 8.6*   HCT 28.6*  --  30.0* 31.9* 28.7*   MCV 86  --  85 86 84   MCH 25.0*  --  24.9* 25.3* 25.1*   MCHC 29.0*  --  29.3* 29.5* 30.0*   RDW 18.7*  --  19.1* 19.6* 19.4*     --  186 165 151     INR  Recent Labs   Lab 07/20/23  1518 07/20/23  1300 07/20/23  0602   INR 1.70* 1.69* 1.26*      Hepatic Panel  Recent Labs   Lab 07/24/23  0631 07/23/23  0735 07/22/23  0639 07/21/23  0329   AST 23 26 37 50*   ALT 11 10 13 17   ALKPHOS 73 74 63 53   BILITOTAL 0.6 0.7 1.0 1.1   ALBUMIN 4.0 4.2 4.5 4.5      GLUCOSE:   Recent Labs   Lab 07/24/23  0631 07/23/23  0735 07/22/23  0639 07/21/23  2335 07/21/23  0329 07/20/23  2202   * 107* 125* 125* 147* 144*

## 2023-07-24 NOTE — OP NOTE
Procedure Date: 07/20/2023    PREOPERATIVE DIAGNOSES:    1.  Prosthetic tricuspid valve stenosis.   2.  Right heart failure.    POSTOPERATIVE DIAGNOSES:    1.  Prosthetic tricuspid valve stenosis.  2.  Right heart failure.    PROCEDURE:   1.  Redo median sternotomy.  2.  Redo tricuspid valve replacement with 29 mm [employer] valve.  3.  Placement of permanent epicardial pacemaker lead.    CARDIAC SURGEON:  Bryce Nowak MD    CO-SURGEON:  Remi Kilpatrick MD.  Note, a second attending surgeon was requested for the operation because of complexity of the operation.       OPERATIVE INDICATIONS:  The patient is a 27-year-old female with a past medical history of atrial myxoma, operating room in [facility] requiring 2 replacements of tricuspid valve and a pacemaker placed in 02/2023 for third-degree heart block.  She presented to us for severe bioprosthetic tricuspid valve stenosis with high gradient.  A decision was made to proceed with redo tricuspid valve replacement.    OPERATIVE FINDINGS:  This patient's sternum was of adequate quality.  There was severe adhesion in the pericardial space.  The right atrium was enlarged and thickened.    DESCRIPTION OF OPERATION:  The patient was brought to the operating room in stable condition.  After the administration of general anesthesia, the patient's chest, abdomen, lower extremities were prepped and draped in the usual sterile manner.  Because of the risk of injury to the permanent pacemaker on which the patient was totally dependent, we made a right groin incision and exposed the right femoral artery and vein.  We then did a redo median sternotomy with the oscillating saw.  The adhesions were carefully dissected to expose the aorta, superior vena cava, inferior cava and the right atrium.  Prep for cardiopulmonary bypass included ACT-guided heparinization and admission of Amicar.  Aorta was cannulated with an 18-Cambodian arterial cannula via 3 O Tevdek pursestring  pledgeted sutures x 2.  Venous cannulae were inserted in both superior and inferior vena cava.  Full cardiopulmonary bypass was initiated.  The rest of the heart was also completely dissected out. Using 5-0 Prolene sutures attached permanent pacemaker leads in the lateral myocardial wall and brought the end of the lead near the permanent pacemaker in the epigastric region.  Caval tapes were then applied.  We then opened the right atrium.  The mechanical prosthetic valve was carefully explanted.  We then put 2 atretic mattress sutures around the annulus without pledgets. A significant amount of pannus was excised with an 11 blade.  These sutures then passed through the sewing ring of a 29 mm [employer] Epic valve, which was seated and tied without difficulty using the Cor-Knot device.  Oblique aortotomy was closed with a double layer of 4-0 Prolene suture.  The patient was gradually weaned off cardiopulmonary bypass.  Following termination of cardiopulmonary bypass, LV function within normal limits.  There was mild right ventricular dysfunction.  There was normal function of prosthetic valve with a gradient of 3 mmHg pressure.  Careful hemostasis was obtained.  Two anterior mediastinal and bilateral pleural chest tubes were placed.  A temporary pacemaker and leads were also placed.  Sternum was closed with surgical steel wires.  Fascia, subcutaneous tissue, and skin of the chest were closed in layers.  The patient was transferred to ICU in stable but critical condition.    Bryce Nowak MD        D: 2023   T: 2023   MT: alfred    Name:     EDDY AKBAR  MRN:      [id number]        Account:        [id number]   :      1995           Procedure Date: 2023     Document: [id number]    cc:  Inderjit Blair MD

## 2023-07-24 NOTE — PROGRESS NOTES
Neuro: A&Ox4; flat affect  Cardiac: V paced; SR; Mg, Phos, and K+ protocols (replaced this morning); denies chest pain  Respiratory: 96% on room air; diminished lungs; no cough  GI/: Constipation, last BM PTA, however patient refuses bowel medications, ongoing education, patient agreed to to try milk of mag after her dinner; produces urine without issue  Endocrine: WDL  Diet/Appetite: Regular diet; thin liquids  Skin: Sternal incision; right groin site; last 2 chest tubes pulled this afternoon  LDA: Right PIV  Activity: Stand by assist  Pain: Constant post op pains; PRN Oxycodone works well along side scheduled Tylenol; alternate ice and heat packs  Plan: Keep team updated; hopeful discharge home tomorrow pending if patient has a BM

## 2023-07-24 NOTE — PROGRESS NOTES
Hx: anemia, hx of an atrial myxoma operated on in [place] c/b damage to the tricuspid valve requiring replacement in 2001 and a revision in 2016. Pacemaker placed in February of 2023 for 3rd degree heart block.     Presents to 7/20 [facility] for redo sternotomy, explantation of mechanical tricuspid valve and replacement with bioprosthetic mitral valve in the tricuspid position by Dr. Nowak.       Neuro: A&O x4 - flat affect. Whispers when talking.   Cardio: Vpaced 70s-80s. Mg, phos and K protocols.   GI/: Last BM PTA. Regular diet - mom brought food from outside. Voiding fine.   Resp: 5L NC per order for BL pneumothraces.   Mobility: Stand by assist  Pain: PRN oxy given for generalized pain.   Skin: See PCS   LDAs: R PIV saline locked

## 2023-07-25 ENCOUNTER — APPOINTMENT (OUTPATIENT)
Dept: GENERAL RADIOLOGY | Facility: CLINIC | Age: 28
DRG: 219 | End: 2023-07-25
Payer: COMMERCIAL

## 2023-07-25 ENCOUNTER — APPOINTMENT (OUTPATIENT)
Dept: CARDIOLOGY | Facility: CLINIC | Age: 28
DRG: 219 | End: 2023-07-25
Payer: COMMERCIAL

## 2023-07-25 LAB
ALBUMIN SERPL BCG-MCNC: 4.1 G/DL (ref 3.5–5.2)
ALP SERPL-CCNC: 85 U/L (ref 35–104)
ALT SERPL W P-5'-P-CCNC: 11 U/L (ref 0–50)
ANION GAP SERPL CALCULATED.3IONS-SCNC: 12 MMOL/L (ref 7–15)
AST SERPL W P-5'-P-CCNC: 26 U/L (ref 0–45)
BILIRUB SERPL-MCNC: 0.6 MG/DL
BUN SERPL-MCNC: 11.1 MG/DL (ref 6–20)
CA-I BLD-MCNC: 4.4 MG/DL (ref 4.4–5.2)
CALCIUM SERPL-MCNC: 9.2 MG/DL (ref 8.6–10)
CHLORIDE SERPL-SCNC: 98 MMOL/L (ref 98–107)
CREAT SERPL-MCNC: 0.51 MG/DL (ref 0.51–0.95)
DEPRECATED HCO3 PLAS-SCNC: 26 MMOL/L (ref 22–29)
ERYTHROCYTE [DISTWIDTH] IN BLOOD BY AUTOMATED COUNT: 18.3 % (ref 10–15)
GFR SERPL CREATININE-BSD FRML MDRD: >90 ML/MIN/1.73M2
GLUCOSE SERPL-MCNC: 101 MG/DL (ref 70–99)
HCT VFR BLD AUTO: 29.3 % (ref 35–47)
HGB BLD-MCNC: 8.4 G/DL (ref 11.7–15.7)
LVEF ECHO: NORMAL
MAGNESIUM SERPL-MCNC: 2.2 MG/DL (ref 1.7–2.3)
MCH RBC QN AUTO: 24.7 PG (ref 26.5–33)
MCHC RBC AUTO-ENTMCNC: 28.7 G/DL (ref 31.5–36.5)
MCV RBC AUTO: 86 FL (ref 78–100)
PHOSPHATE SERPL-MCNC: 3.1 MG/DL (ref 2.5–4.5)
PLATELET # BLD AUTO: 178 10E3/UL (ref 150–450)
POTASSIUM SERPL-SCNC: 3.8 MMOL/L (ref 3.4–5.3)
PROT SERPL-MCNC: 7.3 G/DL (ref 6.4–8.3)
RBC # BLD AUTO: 3.4 10E6/UL (ref 3.8–5.2)
SODIUM SERPL-SCNC: 136 MMOL/L (ref 136–145)
WBC # BLD AUTO: 4.9 10E3/UL (ref 4–11)

## 2023-07-25 PROCEDURE — 83735 ASSAY OF MAGNESIUM: CPT | Performed by: STUDENT IN AN ORGANIZED HEALTH CARE EDUCATION/TRAINING PROGRAM

## 2023-07-25 PROCEDURE — 250N000013 HC RX MED GY IP 250 OP 250 PS 637

## 2023-07-25 PROCEDURE — 71046 X-RAY EXAM CHEST 2 VIEWS: CPT

## 2023-07-25 PROCEDURE — 93321 DOPPLER ECHO F-UP/LMTD STD: CPT | Mod: 26 | Performed by: INTERNAL MEDICINE

## 2023-07-25 PROCEDURE — 93308 TTE F-UP OR LMTD: CPT

## 2023-07-25 PROCEDURE — 93325 DOPPLER ECHO COLOR FLOW MAPG: CPT | Mod: 26 | Performed by: INTERNAL MEDICINE

## 2023-07-25 PROCEDURE — 93325 DOPPLER ECHO COLOR FLOW MAPG: CPT

## 2023-07-25 PROCEDURE — 84100 ASSAY OF PHOSPHORUS: CPT | Performed by: STUDENT IN AN ORGANIZED HEALTH CARE EDUCATION/TRAINING PROGRAM

## 2023-07-25 PROCEDURE — 250N000013 HC RX MED GY IP 250 OP 250 PS 637: Performed by: SURGERY

## 2023-07-25 PROCEDURE — 82330 ASSAY OF CALCIUM: CPT | Performed by: STUDENT IN AN ORGANIZED HEALTH CARE EDUCATION/TRAINING PROGRAM

## 2023-07-25 PROCEDURE — 93308 TTE F-UP OR LMTD: CPT | Mod: 26 | Performed by: INTERNAL MEDICINE

## 2023-07-25 PROCEDURE — 250N000013 HC RX MED GY IP 250 OP 250 PS 637: Performed by: PHYSICIAN ASSISTANT

## 2023-07-25 PROCEDURE — 36415 COLL VENOUS BLD VENIPUNCTURE: CPT | Performed by: STUDENT IN AN ORGANIZED HEALTH CARE EDUCATION/TRAINING PROGRAM

## 2023-07-25 PROCEDURE — 214N000001 HC R&B CCU UMMC

## 2023-07-25 PROCEDURE — 71046 X-RAY EXAM CHEST 2 VIEWS: CPT | Mod: 26 | Performed by: RADIOLOGY

## 2023-07-25 PROCEDURE — 85027 COMPLETE CBC AUTOMATED: CPT | Performed by: STUDENT IN AN ORGANIZED HEALTH CARE EDUCATION/TRAINING PROGRAM

## 2023-07-25 PROCEDURE — 80053 COMPREHEN METABOLIC PANEL: CPT | Performed by: STUDENT IN AN ORGANIZED HEALTH CARE EDUCATION/TRAINING PROGRAM

## 2023-07-25 PROCEDURE — 250N000013 HC RX MED GY IP 250 OP 250 PS 637: Performed by: STUDENT IN AN ORGANIZED HEALTH CARE EDUCATION/TRAINING PROGRAM

## 2023-07-25 RX ORDER — OXYCODONE HYDROCHLORIDE 5 MG/1
5 TABLET ORAL EVERY 6 HOURS PRN
Status: DISCONTINUED | OUTPATIENT
Start: 2023-07-25 | End: 2023-07-26 | Stop reason: HOSPADM

## 2023-07-25 RX ORDER — POLYETHYLENE GLYCOL 3350 17 G/17G
17 POWDER, FOR SOLUTION ORAL 2 TIMES DAILY
Status: DISCONTINUED | OUTPATIENT
Start: 2023-07-25 | End: 2023-07-26 | Stop reason: HOSPADM

## 2023-07-25 RX ORDER — DOCUSATE SODIUM 100 MG/1
100 CAPSULE, LIQUID FILLED ORAL 2 TIMES DAILY
Status: DISCONTINUED | OUTPATIENT
Start: 2023-07-25 | End: 2023-07-26 | Stop reason: HOSPADM

## 2023-07-25 RX ORDER — POTASSIUM CHLORIDE 1.5 G/1.58G
20 POWDER, FOR SOLUTION ORAL ONCE
Status: COMPLETED | OUTPATIENT
Start: 2023-07-25 | End: 2023-07-25

## 2023-07-25 RX ORDER — FERROUS SULFATE 325(65) MG
325 TABLET ORAL DAILY
Status: DISCONTINUED | OUTPATIENT
Start: 2023-07-25 | End: 2023-07-26 | Stop reason: HOSPADM

## 2023-07-25 RX ORDER — OXYCODONE HYDROCHLORIDE 10 MG/1
10 TABLET ORAL EVERY 6 HOURS PRN
Status: DISCONTINUED | OUTPATIENT
Start: 2023-07-25 | End: 2023-07-26 | Stop reason: HOSPADM

## 2023-07-25 RX ORDER — METHOCARBAMOL 750 MG/1
750 TABLET, FILM COATED ORAL EVERY 6 HOURS
Status: DISCONTINUED | OUTPATIENT
Start: 2023-07-25 | End: 2023-07-26 | Stop reason: HOSPADM

## 2023-07-25 RX ADMIN — MAGNESIUM HYDROXIDE 30 ML: 2400 SUSPENSION ORAL at 09:04

## 2023-07-25 RX ADMIN — DOCUSATE SODIUM 100 MG: 100 CAPSULE, LIQUID FILLED ORAL at 09:58

## 2023-07-25 RX ADMIN — Medication 1 CAPSULE: at 09:58

## 2023-07-25 RX ADMIN — ACETAMINOPHEN 975 MG: 325 TABLET, FILM COATED ORAL at 18:30

## 2023-07-25 RX ADMIN — OXYCODONE HYDROCHLORIDE 10 MG: 10 TABLET ORAL at 18:54

## 2023-07-25 RX ADMIN — GABAPENTIN 100 MG: 100 CAPSULE ORAL at 18:30

## 2023-07-25 RX ADMIN — METHOCARBAMOL 750 MG: 750 TABLET ORAL at 16:43

## 2023-07-25 RX ADMIN — METHOCARBAMOL 750 MG: 750 TABLET ORAL at 09:58

## 2023-07-25 RX ADMIN — ASPIRIN 81 MG: 81 TABLET, COATED ORAL at 07:48

## 2023-07-25 RX ADMIN — POLYETHYLENE GLYCOL 3350 17 G: 17 POWDER, FOR SOLUTION ORAL at 07:48

## 2023-07-25 RX ADMIN — FERROUS SULFATE TAB 325 MG (65 MG ELEMENTAL FE) 325 MG: 325 (65 FE) TAB at 18:30

## 2023-07-25 RX ADMIN — LIDOCAINE PATCH 4% 1 PATCH: 40 PATCH TOPICAL at 20:23

## 2023-07-25 RX ADMIN — OXYCODONE HYDROCHLORIDE 10 MG: 10 TABLET ORAL at 12:08

## 2023-07-25 RX ADMIN — ACETAMINOPHEN 975 MG: 325 TABLET, FILM COATED ORAL at 07:48

## 2023-07-25 RX ADMIN — GABAPENTIN 100 MG: 100 CAPSULE ORAL at 07:48

## 2023-07-25 RX ADMIN — POTASSIUM CHLORIDE 20 MEQ: 1.5 POWDER, FOR SOLUTION ORAL at 07:48

## 2023-07-25 RX ADMIN — PANTOPRAZOLE SODIUM 40 MG: 40 TABLET, DELAYED RELEASE ORAL at 07:48

## 2023-07-25 RX ADMIN — OXYCODONE HYDROCHLORIDE 10 MG: 10 TABLET ORAL at 06:40

## 2023-07-25 ASSESSMENT — ACTIVITIES OF DAILY LIVING (ADL)
ADLS_ACUITY_SCORE: 22

## 2023-07-25 NOTE — PROGRESS NOTES
Neuro: A&Ox4; flat affect  Cardiac: V paced; SR; Mg, Phos, and K+ protocols (replaced this morning); denies chest pain  Respiratory: 96% on room air; diminished lungs; no cough  GI/: Small BM today after many days without, continue bowel medications, encourage suppository next; produces urine without issue  Endocrine: WDL  Diet/Appetite: Regular diet; thin liquids  Skin: Sternal incision; right groin site; old chest tube sites; patient showered today  LDA: Right PIV  Activity: Stand by assist  Pain: Post op pains; PRN Oxycodone works well along side scheduled Tylenol and Robaxin  Plan: Discharge home after another successful BM

## 2023-07-25 NOTE — PROGRESS NOTES
Cardiovascular Surgery Progress Note  07/25/2023         Assessment and Plan:     Silvia Otero is a 27 year old female with PMH of anemia,  hx of an atrial myxoma operated on in [place] c/b damage to the tricuspid valve requiring replacement in 2001 and a revision in 2016. Pacemaker placed in February of 2023 for 3rd degree heart block  Presents to 7/20 [facility] for redo sternotomy, explantation of mechanical tricuspid valve and bioprosthetic tricuspid valve replacement by Dr. Nowak on 7/20/23.     Cardiovascular:   S/p redo sternotomy, explantation of mechanical TV, bioprosthetic tricuspid valve replacement by Dr. Nowak 7/20/22  3rd degree AV block s/p pacemaker (2016) and replacement (2023)  Hx of prior tricuspid revision (2001) and replacement (2016)  Hx of atrial myxoma s/p resection (1997)  Permanent pacemaker; backup paced at 70 bpm; intrinsic rate in the 70s, no arrhythmias, HD stable  Pre-operative echocardiogram (7/3/2023) recent echo showed LV EF 55-60%, normal RV function, mechanical TV with average gradient of 14 mmHg.  - ASA 81 mg  - Beta blocker, statin not indicated  - Post-operative device check completed 7/20/2023  - Post-operative echocardiogram 7/25 showed LV EF 60-65%, moderate reduction of RV function, well seated TV valve with mean gradient 6 mmHg. Abnormal non-specific septal motion noted.    Chest tubes: pleural chest tubes removed 7/22, mediastinal chest tubes removed 7/24   TPW: N/A    Pulmonary:  Bilateral apical pneumothoraces, improving  - Extubated POD 0 to 2-4 lpm via NC. Was intermittently on 5 LPM via nasal cannula for pneumothorax. Now saturating well on RA.  - Supplemental O2 PRN to keep sats > 92%. Wean off as tolerated.  - Pulm toilet, IS, activity and deep breathing  - Bilateral pneumothoraces s/p pleural chest tube removal on 7/22, asymptomatic, improving on CXR.   - 7/25 CXR showed trace bilateral apical pneumothoraces and stable trace bilateral pleural effusions.    Neurology /  MSK:  Acute post-operative pain  - Neuro intact  - Acute post-operative pain well controlled with scheduled acetaminophen, scheduled methocarbamol, gabapentin 100 mg TID, PO oxycodone PRN - weaning.      / Renal:  - No history of renal disease. Baseline creatinine ~0.5-0.7.  - Most recent creatinine 0.51, adequate UOP.   - Pre-op weight 177 lbs, most recent weight 171 lbs.   - Diuresis: not indicated, patient clinically euvolemic    GI / FEN:   Obesity  Constipation  - Regular diet  - Replace electrolytes as needed, hepatic enzymes WNL.  - Only 1 very small BM since surgery, + flatus. Bowel regimen: Miralax BID, Colace BID, metamucil, repeat MOM 7/25. Will attempt suppository if not successful.   - Encouraged ambulation    Endocrine:  Stress-induced hyperglycemia  Pre-op Hgb A1C 5.2.  - Managed on insulin drip postop, transitioned to sliding scale goal BG <180 and has now also been discontinued due to good BG control with no insulin need.     Infectious Disease:  Stress-induced leukocytosis  - WBC 4.9 (down from peak of 14.5 on 7/22), remains afebrile, no signs or symptoms of infection  - Completed perioperative antibiotics    Hematology:   Acute blood loss anemia and thrombocytopenia  Hx pancytopenia  Hgb 8.4; Plt 178, no signs or symptoms of active bleeding  - Daily CBC  - Hbg goal > 7.0 (Hbg 10.5 preoperatively)  - Resumed PTA ferrous sulfate 325 mg daily    MSK/Skin:  Sternotomy  Right groin incision  - Sternal precautions    Anticoagulation:   - ASA only; no need for further anticoagulation per surgeon    Prophylaxis:   - Stress ulcer prophylaxis: Pantoprazole 40 mg daily for 30 days  - DVT prophylaxis: Subcutaneous heparin, SCD    Disposition:   - Transferred to  on 7/24/23  - Therapies recommending discharge to home once medically ready. Barriers to discharge are antegrade bowel function.     Discussed with Dr. Nowak through written communication.      Sara Pepe PA-C  Cardiothoracic Surgery  Pager  "[phone]    8:20 AM July 25, 2023      Interval History:   No overnight events.  States pain is well managed on current regimen. Feels overall much better since chest tubes were removed yesterday. Slept adequately overnight.  Tolerating diet, is passing flatus frequently but no BM yet. Has been denying some bowel meds - will not take Senokot as she feels it makes her more constipated. She is amenable to trying other options. No nausea or vomiting, no abdominal pain.  Breathing well without complaints. Denies cough.    Working with therapies and ambulating in halls with assistance.   Denies chest pain, palpitations, dizziness, syncopal symptoms, fevers, chills, myalgias, or sternal popping/clicking.         Physical Exam:   Blood pressure 106/51, pulse 72, temperature 98.8  F (37.1  C), temperature source Oral, resp. rate 17, height 1.753 m (5' 9\"), weight 77.7 kg (171 lb 3.2 oz), last menstrual period 07/18/2023, SpO2 96 %, not currently breastfeeding.  Vitals:    07/23/23 0400 07/24/23 0355 07/25/23 0600   Weight: 76.8 kg (169 lb 5 oz) 75.1 kg (165 lb 9.1 oz) 77.7 kg (171 lb 3.2 oz)      Weight; -7 lbs since admit   24 hr Fluid status; net -250 mL.  mL  MAPs: 64-90    Gen: A&Ox4, NAD  Neuro: Intact with no focal deficits   CV: RRR, normal S1 S2, no murmurs, rubs or gallops. No JVD appreciated.   Pulm: CTA, no wheezing or rhonchi, normal breathing on RA  Abd: nondistended, normal BS, soft, nontender  Ext: no peripheral edema, no pitting  Skin:  Sternal incision: clean, dry, intact, no erythema, sternum stable  Groin incisions: right groin incision clean, dry, intact, tender to palpation, no erythema  Tubes/drain sites: dressing clean and dry         Data:    Imaging:  reviewed recent imaging, no acute concerns    Labs:  BMP  Recent Labs   Lab 07/25/23  0515 07/24/23  0631 07/23/23  0735 07/22/23  0639    134* 135* 133*   POTASSIUM 3.8 3.9 3.9 4.1   CHLORIDE 98 100 98 97*   DENIS 9.2 9.1 9.2 9.5   CO2 " 26 25 25 24   BUN 11.1 13.5 9.2 10.3   CR 0.51 0.47* 0.48* 0.55   * 107* 107* 125*     CBC  Recent Labs   Lab 07/25/23  0515 07/24/23  0631 07/23/23  1358 07/23/23  0735 07/22/23  0639   WBC 4.9 6.7  --  10.6 14.5*   RBC 3.40* 3.32*  --  3.54* 3.72*   HGB 8.4* 8.3* 8.8* 8.8* 9.4*   HCT 29.3* 28.6*  --  30.0* 31.9*   MCV 86 86  --  85 86   MCH 24.7* 25.0*  --  24.9* 25.3*   MCHC 28.7* 29.0*  --  29.3* 29.5*   RDW 18.3* 18.7*  --  19.1* 19.6*    179  --  186 165     INR  Recent Labs   Lab 07/20/23  1518 07/20/23  1300 07/20/23  0602   INR 1.70* 1.69* 1.26*      Hepatic Panel  Recent Labs   Lab 07/25/23  0515 07/24/23  0631 07/23/23  0735 07/22/23  0639   AST 26 23 26 37   ALT 11 11 10 13   ALKPHOS 85 73 74 63   BILITOTAL 0.6 0.6 0.7 1.0   ALBUMIN 4.1 4.0 4.2 4.5     GLUCOSE:   Recent Labs   Lab 07/25/23  0515 07/24/23  0631 07/23/23  0735 07/22/23  0639 07/21/23  2335 07/21/23  0329   * 107* 107* 125* 125* 147*

## 2023-07-26 ENCOUNTER — DOCUMENTATION ONLY (OUTPATIENT)
Dept: ANTICOAGULATION | Facility: CLINIC | Age: 28
End: 2023-07-26
Payer: COMMERCIAL

## 2023-07-26 VITALS
TEMPERATURE: 98.2 F | HEIGHT: 69 IN | RESPIRATION RATE: 16 BRPM | DIASTOLIC BLOOD PRESSURE: 67 MMHG | HEART RATE: 70 BPM | OXYGEN SATURATION: 96 % | WEIGHT: 171.1 LBS | SYSTOLIC BLOOD PRESSURE: 105 MMHG | BODY MASS INDEX: 25.34 KG/M2

## 2023-07-26 LAB
ANION GAP SERPL CALCULATED.3IONS-SCNC: 12 MMOL/L (ref 7–15)
BUN SERPL-MCNC: 12.6 MG/DL (ref 6–20)
CALCIUM SERPL-MCNC: 9.1 MG/DL (ref 8.6–10)
CHLORIDE SERPL-SCNC: 100 MMOL/L (ref 98–107)
CREAT SERPL-MCNC: 0.55 MG/DL (ref 0.51–0.95)
DEPRECATED HCO3 PLAS-SCNC: 24 MMOL/L (ref 22–29)
ERYTHROCYTE [DISTWIDTH] IN BLOOD BY AUTOMATED COUNT: 18.2 % (ref 10–15)
GFR SERPL CREATININE-BSD FRML MDRD: >90 ML/MIN/1.73M2
GLUCOSE SERPL-MCNC: 103 MG/DL (ref 70–99)
HCT VFR BLD AUTO: 27.2 % (ref 35–47)
HGB BLD-MCNC: 7.8 G/DL (ref 11.7–15.7)
MAGNESIUM SERPL-MCNC: 2 MG/DL (ref 1.7–2.3)
MCH RBC QN AUTO: 23.9 PG (ref 26.5–33)
MCHC RBC AUTO-ENTMCNC: 28.7 G/DL (ref 31.5–36.5)
MCV RBC AUTO: 83 FL (ref 78–100)
PHOSPHATE SERPL-MCNC: 3.2 MG/DL (ref 2.5–4.5)
PLATELET # BLD AUTO: 194 10E3/UL (ref 150–450)
POTASSIUM SERPL-SCNC: 4.3 MMOL/L (ref 3.4–5.3)
RBC # BLD AUTO: 3.26 10E6/UL (ref 3.8–5.2)
SODIUM SERPL-SCNC: 136 MMOL/L (ref 136–145)
WBC # BLD AUTO: 6.3 10E3/UL (ref 4–11)

## 2023-07-26 PROCEDURE — 250N000013 HC RX MED GY IP 250 OP 250 PS 637: Performed by: PHYSICIAN ASSISTANT

## 2023-07-26 PROCEDURE — 250N000013 HC RX MED GY IP 250 OP 250 PS 637: Performed by: STUDENT IN AN ORGANIZED HEALTH CARE EDUCATION/TRAINING PROGRAM

## 2023-07-26 PROCEDURE — 84100 ASSAY OF PHOSPHORUS: CPT | Performed by: STUDENT IN AN ORGANIZED HEALTH CARE EDUCATION/TRAINING PROGRAM

## 2023-07-26 PROCEDURE — 250N000013 HC RX MED GY IP 250 OP 250 PS 637

## 2023-07-26 PROCEDURE — 250N000013 HC RX MED GY IP 250 OP 250 PS 637: Performed by: SURGERY

## 2023-07-26 PROCEDURE — 83735 ASSAY OF MAGNESIUM: CPT | Performed by: STUDENT IN AN ORGANIZED HEALTH CARE EDUCATION/TRAINING PROGRAM

## 2023-07-26 PROCEDURE — 80048 BASIC METABOLIC PNL TOTAL CA: CPT

## 2023-07-26 PROCEDURE — 85027 COMPLETE CBC AUTOMATED: CPT | Performed by: STUDENT IN AN ORGANIZED HEALTH CARE EDUCATION/TRAINING PROGRAM

## 2023-07-26 PROCEDURE — 36415 COLL VENOUS BLD VENIPUNCTURE: CPT

## 2023-07-26 RX ORDER — GABAPENTIN 100 MG/1
100 CAPSULE ORAL 3 TIMES DAILY PRN
Qty: 60 CAPSULE | Refills: 0 | Status: SHIPPED | OUTPATIENT
Start: 2023-07-26 | End: 2024-04-17

## 2023-07-26 RX ORDER — OXYCODONE HYDROCHLORIDE 5 MG/1
5 TABLET ORAL EVERY 8 HOURS PRN
Qty: 16 TABLET | Refills: 0 | Status: SHIPPED | OUTPATIENT
Start: 2023-07-26

## 2023-07-26 RX ORDER — DOCUSATE SODIUM 100 MG/1
100 CAPSULE, LIQUID FILLED ORAL 2 TIMES DAILY PRN
Qty: 30 CAPSULE | Refills: 0 | Status: SHIPPED | OUTPATIENT
Start: 2023-07-26 | End: 2024-04-17

## 2023-07-26 RX ORDER — POLYETHYLENE GLYCOL 3350 17 G/17G
17 POWDER, FOR SOLUTION ORAL DAILY
Qty: 510 G | Refills: 0 | Status: SHIPPED | OUTPATIENT
Start: 2023-07-26 | End: 2024-04-17

## 2023-07-26 RX ORDER — METHOCARBAMOL 750 MG/1
750 TABLET, FILM COATED ORAL EVERY 6 HOURS PRN
Qty: 120 TABLET | Refills: 0 | Status: SHIPPED | OUTPATIENT
Start: 2023-07-26 | End: 2024-04-17

## 2023-07-26 RX ORDER — FERROUS SULFATE 325(65) MG
325 TABLET ORAL
Qty: 30 TABLET | Refills: 0 | Status: SHIPPED | OUTPATIENT
Start: 2023-07-26

## 2023-07-26 RX ORDER — PANTOPRAZOLE SODIUM 40 MG/1
40 TABLET, DELAYED RELEASE ORAL DAILY
Qty: 22 TABLET | Refills: 0 | Status: SHIPPED | OUTPATIENT
Start: 2023-07-26 | End: 2023-08-17

## 2023-07-26 RX ORDER — MAGNESIUM OXIDE 400 MG/1
400 TABLET ORAL EVERY 4 HOURS
Status: DISCONTINUED | OUTPATIENT
Start: 2023-07-26 | End: 2023-07-26 | Stop reason: HOSPADM

## 2023-07-26 RX ORDER — ACETAMINOPHEN 500 MG
975 TABLET ORAL EVERY 8 HOURS PRN
Qty: 100 TABLET | Refills: 0 | Status: SHIPPED | OUTPATIENT
Start: 2023-07-26

## 2023-07-26 RX ADMIN — ASPIRIN 81 MG: 81 TABLET, COATED ORAL at 09:33

## 2023-07-26 RX ADMIN — FERROUS SULFATE TAB 325 MG (65 MG ELEMENTAL FE) 325 MG: 325 (65 FE) TAB at 09:33

## 2023-07-26 RX ADMIN — Medication 1 CAPSULE: at 09:33

## 2023-07-26 RX ADMIN — GABAPENTIN 100 MG: 100 CAPSULE ORAL at 09:33

## 2023-07-26 RX ADMIN — DOCUSATE SODIUM 100 MG: 100 CAPSULE, LIQUID FILLED ORAL at 09:33

## 2023-07-26 RX ADMIN — ACETAMINOPHEN 975 MG: 325 TABLET, FILM COATED ORAL at 09:32

## 2023-07-26 RX ADMIN — OXYCODONE HYDROCHLORIDE 10 MG: 10 TABLET ORAL at 09:31

## 2023-07-26 RX ADMIN — PANTOPRAZOLE SODIUM 40 MG: 40 TABLET, DELAYED RELEASE ORAL at 09:33

## 2023-07-26 RX ADMIN — MAGNESIUM OXIDE TAB 400 MG (241.3 MG ELEMENTAL MG) 400 MG: 400 (241.3 MG) TAB at 09:33

## 2023-07-26 ASSESSMENT — ACTIVITIES OF DAILY LIVING (ADL)
ADLS_ACUITY_SCORE: 22

## 2023-07-26 NOTE — DISCHARGE SUMMARY
"[facility]   Cardiothoracic Surgery Hospital Discharge Summary     Silvia Otero MRN# [id number]   Age: 27 year old YOB: 1995     Admitting Physician:  Bryce Nowak MD  Discharge Physician:  Toyin Pepe PA-C  Primary Care Physician:        America Christianson     DATE OF ADMISSION: 7/20/2023      DATE OF DISCHARGE: July 26, 2023     Admit Wt: 177 lbs  Discharge Wt: 171 lbs          Primary Diagnoses:   S/p redo sternotomy, explantation of mechanical TV, bioprosthetic tricuspid valve replacement by Dr. Nowak 7/20/22  Bilateral apical pneumothoraces, improved, now trace & stable  Retained foreign body - transcutaneous pacing wires sewn into myocardium, cut at skin          Secondary Diagnoses:   Acute blood loss anemia, stable  Acute blood loss thrombocytopenia, resolved  3rd degree AV block s/p pacemaker (2016) and replacement (2023)  Hx of prior tricuspid revision (2001) and replacement (2016)  Hx of atrial myxoma s/p resection (1997)  Constipation, resolved    PROCEDURES PERFORMED:   Date: 7/20/2023.  Surgeon: Dr. Bryce Nowak  1.  Redo median sternotomy.  2.  Redo tricuspid valve replacement with 29 mm St. Aniceto Epic valve.  3.  Placement of permanent epicardial pacemaker lead.    INTRAOPERATIVE FINDINGS:    This patient's sternum was of adequate quality. There was severe adhesion in the pericardial space. The right atrium was enlarged and thickened.     PATHOLOGY RESULTS:    A(1). Heart Valve, Tricuspid, Explanted mechanical tricuspid valve:  The specimen is received in formalin with proper patient identification, labeled \"explanted mechanical tricuspid valve\".  The specimen consists of a metal bioprosthetic valve with attached sewing ring measuring 3.5 cm (outer diameter) and 2.5 cm (inner diameter).  2 leaflets are present with mobility.  No vegetations or calcifications are present.  No tissue is on the specimen.  No sections are submitted.  The " specimen is for gross analysis only.                CULTURE RESULTS:    none    CONSULTS:    PT/OT  Intensivist    BRIEF HISTORY OF ILLNESS:  Silvia Otero is a 27 year old female with PMH of anemia,  hx of an atrial myxoma operated on in [place] c/b damage to the tricuspid valve requiring replacement in 2001 and a revision in 2016. Pacemaker placed in February of 2023 for 3rd degree heart block  Presents to 7/20 [facility] for redo sternotomy, explantation of mechanical tricuspid valve and bioprosthetic tricuspid valve replacement by Dr. Nowak on 7/20/23.      HOSPITAL COURSE: Silvia Otero is a 27 year old female who on 7/20/23 underwent the above-named procedures and tolerated the operation well.     Postoperatively was admitted to the CVICU.  Patient was extubated within protocol on POD #0 to 4LPM supplementary O2 via nasal cannula.  Blood pressure and cardiac index were managed with vasopressors and inotropic agents which were continuously weaned until no longer needed.  Patient was subsequently  transferred to the surgical telemetry floor.    Pacing wires were sewn into the heart during surgery and were therefore cut at the skin.    While on the surgical unit, the patient continued to progress well. Chest tubes were removed when appropriate. Patient developed bilateral pneumothoraces after pleural chest tubes were removed, which were improved to trace pneumothoraces and stable by date of discharge without any intervention. Patient was asymptomatic.      Patient was euvolemic and did not require diuretic therapy during their stay. They are 6 lbs down from preoperative weight and will discharge without diuretic therapy; will re-evaluate need in clinic follow-up.      Patient was transiently hyperglycemic and treated with insulin infusion then transitioned to sliding scale insulin per protocol. Blood sugars remained stable. No further glycemic control agents needed at this time.    As patient had a bioprosthetic  "valve place, there was not further need for anticoagulation per the surgeon. This was not resumed after her surgery as she has no other indications.     Prior to discharge, her pain was controlled well, she was working well with therapies, able to perform most ADLs, ambulate without difficulty, and had full return of bowel and bladder function.  On July 26, 2023, she was discharged to home with her family in stable condition. Follow up with cardiology and cardiac surgery have been arranged. Pt encouraged to follow up with PCP and cardiac rehab upon discharge.    Patient discharged on aspirin:  Yes 81 mg  Patient discharged on beta blocker: no, not indicated   Patient discharged on ACE Inhibitor/ARB: no, not indicated  Patient discharged on statin: no, not indicated         Discharge Disposition:     Discharged to home            Condition on Discharge:     Discharge condition: Stable   Discharge vitals: Blood pressure 105/67, pulse 70, temperature 98.2  F (36.8  C), temperature source Oral, resp. rate 16, height 1.753 m (5' 9\"), weight 77.6 kg (171 lb 1.6 oz), last menstrual period 07/18/2023, SpO2 96 %, not currently breastfeeding.   Code status on discharge: Full Code     Vitals:    07/24/23 0355 07/25/23 0600 07/26/23 0508   Weight: 75.1 kg (165 lb 9.1 oz) 77.7 kg (171 lb 3.2 oz) 77.6 kg (171 lb 1.6 oz)       DAY OF DISCHARGE PHYSICAL EXAM:    Gen: A&Ox4, NAD  Neuro: no focal deficits   CV: RRR, normal S1 S2, no murmurs, rubs or gallops. No JVD appreciated.  Pulm: CTA, no wheezing or rhonchi, normal breathing on RA  Abd: nondistended, normal BS, soft, nontender  Ext: no peripheral edema, no pitting  Sternal incision: clean, dry, intact, no erythema, sternum stable  Groin incisions: right groin incision clean, dry, intact, tender to palpation, healing ridge, no erythema  Tubes/drain sites: dressing clean and dry    LABS  Most Recent 3 CBC's:  Recent Labs   Lab Test 07/26/23  0559 07/25/23  0515 07/24/23  0631 "   WBC 6.3 4.9 6.7   HGB 7.8* 8.4* 8.3*   MCV 83 86 86    178 179      Most Recent 3 BMP's:  Recent Labs   Lab Test 07/26/23  0559 07/25/23  0515 07/24/23  0631    136 134*   POTASSIUM 4.3 3.8 3.9   CHLORIDE 100 98 100   CO2 24 26 25   BUN 12.6 11.1 13.5   CR 0.55 0.51 0.47*   ANIONGAP 12 12 9   DENIS 9.1 9.2 9.1   * 101* 107*     Most Recent 2 LFT's:  Recent Labs   Lab Test 07/25/23  0515 07/24/23  0631   AST 26 23   ALT 11 11   ALKPHOS 85 73   BILITOTAL 0.6 0.6     Most Recent INR's and Anticoagulation Dosing History:  Anticoagulation Dose History  More data exists         Latest Ref Rng & Units 4/25/2023 6/6/2023 6/16/2023 7/3/2023   Recent Dosing and Labs   INR 0.85 - 1.15 4.3  1.95  3.9  1.75          7/13/2023 7/15/2023 7/20/2023   Recent Dosing and Labs   INR 2.7  3.81  1.70  1.69  1.26      Most Recent 3 Troponin's:No lab results found.  Most Recent Cholesterol Panel:No lab results found.  Most Recent 6 Bacteria Isolates From Any Culture (See EPIC Reports for Culture Details):No lab results found.  Most Recent TSH, T4 and A1c Labs:  Recent Labs   Lab Test 06/06/23  1434 02/24/23  0045   TSH  --  2.39   A1C 5.2  --       Recent Labs   Lab 07/26/23  0559 07/25/23  0515 07/24/23  0631 07/23/23  0735 07/22/23  0639 07/21/23  2335   * 101* 107* 107* 125* 125*       Imaging:  Echocardiogram 7/26:  Left ventricular function is normal.The ejection fraction is 60-65%.  Global right ventricular function is moderately reduced.  A well seated bioprosthetic tricuspid valve is present. TV mean gradient 6  mmHg.  No pericardial effusion is present.     Chest radiograph 7/25:  1. Postsurgical changes of the chest from tricuspid valve replacement.  2. Stable trace bilateral apical pneumothoraces.  3. Stable trace bilateral pleural effusions.  4. Stable streaky medial bibasilar opacities, likely atelectasis  and/or pulmonary edema.       PRE-ADMISSION MEDICATIONS:  Medications Prior to Admission    Medication Sig Dispense Refill Last Dose    aspirin (ASA) 81 MG EC tablet Take 1 tablet (81 mg) by mouth daily 100 tablet 0 7/19/2023    [DISCONTINUED] enoxaparin ANTICOAGULANT (LOVENOX) 80 MG/0.8ML syringe Inject 0.8 mLs (80 mg) Subcutaneous every 12 hours Before and after procedure as directed 8 mL 1 7/19/2023    [DISCONTINUED] ferrous sulfate (FEROSUL) 325 (65 Fe) MG tablet Take 325 mg by mouth daily (with breakfast)   7/19/2023    [DISCONTINUED] warfarin ANTICOAGULANT (COUMADIN) 10 MG tablet Take 15 mg every day. Or as directed. (Patient taking differently: Take 15 mg by mouth every evening Or as directed.) 135 tablet 0 7/14/2023       DISCHARGE MEDICATIONS:   Current Discharge Medication List        START taking these medications    Details   acetaminophen (TYLENOL) 500 MG tablet Take 2 tablets (1,000 mg) by mouth every 8 hours as needed for mild pain or pain  Qty: 100 tablet, Refills: 0    Associated Diagnoses: History of tricuspid valve replacement with bioprosthetic valve      docusate sodium (COLACE) 100 MG capsule Take 1 capsule (100 mg) by mouth 2 times daily as needed for constipation  Qty: 30 capsule, Refills: 0    Associated Diagnoses: History of tricuspid valve replacement with bioprosthetic valve      gabapentin (NEURONTIN) 100 MG capsule Take 1 capsule (100 mg) by mouth 3 times daily as needed for other (post-operative pain)  Qty: 60 capsule, Refills: 0    Associated Diagnoses: History of tricuspid valve replacement with bioprosthetic valve      methocarbamol (ROBAXIN) 750 MG tablet Take 1 tablet (750 mg) by mouth every 6 hours as needed for muscle spasms  Qty: 120 tablet, Refills: 0    Associated Diagnoses: History of tricuspid valve replacement with bioprosthetic valve      oxyCODONE (ROXICODONE) 5 MG tablet Take 1 tablet (5 mg) by mouth every 8 hours as needed for moderate to severe pain  Qty: 16 tablet, Refills: 0    Associated Diagnoses: History of tricuspid valve replacement with  bioprosthetic valve      pantoprazole (PROTONIX) 40 MG EC tablet Take 1 tablet (40 mg) by mouth daily for 22 days  Qty: 22 tablet, Refills: 0    Associated Diagnoses: History of tricuspid valve replacement with bioprosthetic valve      polyethylene glycol (MIRALAX) 17 GM/Dose powder Take 17 g by mouth daily  Qty: 510 g, Refills: 0    Associated Diagnoses: History of tricuspid valve replacement with bioprosthetic valve      psyllium (METAMUCIL/KONSYL) capsule Take 1 capsule by mouth daily as needed for constipation  Qty: 30 capsule, Refills: 0    Comments: Pharmacy to dispense capsule size that is available.  Associated Diagnoses: History of tricuspid valve replacement with bioprosthetic valve           CONTINUE these medications which have CHANGED    Details   ferrous sulfate (FEROSUL) 325 (65 Fe) MG tablet Take 1 tablet (325 mg) by mouth daily (with breakfast)  Qty: 30 tablet, Refills: 0    Associated Diagnoses: History of tricuspid valve replacement with bioprosthetic valve; Iron deficiency anemia due to chronic blood loss           CONTINUE these medications which have NOT CHANGED    Details   aspirin (ASA) 81 MG EC tablet Take 1 tablet (81 mg) by mouth daily  Qty: 100 tablet, Refills: 0    Associated Diagnoses: History of tricuspid valve replacement with mechanical valve           STOP taking these medications       enoxaparin ANTICOAGULANT (LOVENOX) 80 MG/0.8ML syringe Comments:   Reason for Stopping:         warfarin ANTICOAGULANT (COUMADIN) 10 MG tablet Comments:   Reason for Stopping:                CC:America Estrada [facility]   Cardiothoracic Surgery  Office phone: [phone]  Office fax: [phone]

## 2023-07-26 NOTE — PROGRESS NOTES
ANTICOAGULATION  MANAGEMENT: Discharge Review    Silvia Otero chart reviewed for anticoagulation continuity of care    Hospital Admission on 7/20/23-7/26/23 for bioprosthetic tricuspid valve replacement by Dr. Nowak 7/20/22 .    Discharge disposition: Home    Results:    Recent labs: (last 7 days)     07/20/23  0602 07/20/23  1300 07/20/23  1518   INR 1.26* 1.69* 1.70*     Anticoagulation inpatient management:     not applicable     Anticoagulation discharge instructions:     Warfarin dosing: warfarin discontinued   Bridging: No   INR goal change: No      Medication changes affecting anticoagulation: Yes: Warfarin Discontinued    Additional factors affecting anticoagulation: No     PLAN     Silvia Otero is being discharged from the [facility] ([facility]).    Reason for discharge: warfarin therapy completed    Anticoagulation episode resolved, [facility] referral closed, and Standing order discontinued    If patient needs warfarin management in the future, please send a new referral    Rebecca Barrientos RN

## 2023-07-26 NOTE — DISCHARGE INSTRUCTIONS
AFTER YOU GO HOME FROM YOUR HEART SURGERY  (Redo sternotomy, tricuspid valve replacement with 29 mm St. Aniceto Epic valve on 7/20/23 by Dr. Bryce Nowak)    You had a sternotomy, avoid lifting anything greater than ten pounds for 6 weeks after surgery and then less than 20 pounds for an additional 6 weeks.   Do not reach backwards or use arms to push out of chair.   Do not let people pull on your arms to assist with standing.   Avoid twisting or reaching too far across your body.    Avoid strenuous activities such as bowling, vacuuming, raking, shoveling, golf or tennis for 12 weeks after your surgery.   It is okay to resume sex if you feel comfortable in doing so. You may have to try different positions with your partner.    Splint your chest incision by hugging a pillow or bringing your arms across your chest when coughing or sneezing.     No driving for 4 weeks after surgery or while on pain medication.    Shower or wash your incisions daily with soap and water (or as instructed), pat dry.   Keep wound clean and dry, showers are okay after discharge, but don't let spray hit directly on incision.   No baths or swimming for 1 month.   Cover chest tube sites with dry gauze until they stop draining, then leave open to air. It is not abnormal for chest tube sites to drain yellowish/clear fluid for up to 2-3 weeks after surgery.   Watch for signs of infection: increased redness, tenderness, warmth or any drainage that appears infected (pus like) or is persistent.  Also a temperature > 100.5 F or chills. Call your surgeon or primary care provider's office immediately.   Remove any skin glue left on incisions after 10-14 days. This will not affect your incision and can speed up healing.    Exercise is very important in your recovery. Please follow the guidelines set up for you in your cardiac rehab classes at the hospital. If outpatient cardiac rehab was ordered for you, we highly recommend you participate. If you have  "problems arranging your cardiac rehab, please call [phone] for all locations, with the exception of [place], please call [phone] and [place], please call [phone].    Avoid sitting for prolonged periods of time, try to walk every hour during the day. If you have a leg incision, elevate your leg often when you are not walking.    Check your weight when you get home from the hospital and continue to check it daily through your recovery for at least a month. If you notice a weight gain of 2-3 pounds in a week, notify your primary care physician, cardiologist or surgeon.    Bowel activity may be slow after surgery. If necessary, you may take an over the counter laxative such as Milk of Magnesia or Miralax. You may have stool softeners prescribed (docusate sodium, Senokot). We recommend using stool softeners while using narcotics for pain (oxycodone/percocet, hydrocodone/vicodin, hydromorphone/dilaudid).      Wean OFF of narcotics (oxycodone, dilaudid, hydrocodone) as soon as possible. You should continue taking acetaminophen as long as you have any surgical pain as the first choice for pain control and add narcotics as necessary for pain to be tolerable.      DENTAL VISITS AFTER SURGERY  If you have had your heart valve repaired or replaced, we do not recommend having any dental work done for 6 months and you will need to take an antibiotic prior to dental visits from now on.  Please notify your dentist before any procedure for the proper treatment needed. The antibiotic is taken by mouth one hour prior to visit. This includes routine cleanings.  You can sometimes hear a mechanical valve \"clicking,\" this is normal and not a sign of something wrong.    DO NOT SMOKE.  IF YOU NEED HELP QUITTING, PLEASE TALK WITH YOUR CARDIOLOGIST OR PRIMARY DOCTOR.    You are not on a blood thinner.    REGARDING PRESCRIPTION REFILLS.  If you need a refill on your pain medication contact us to discuss your pain and a " possible one time refill.   All other medications will be adjusted, discontinued and re-filled by your primary care physician and/or your cardiologist as they were prior to your surgery. We have given you enough for one to three month with possibly one refill.    POST-OPERATIVE CLINIC VISITS  You have a follow up visit with [facility] Surgery Advance Care Practitioners at the [facility].  You will then return to the care of your primary provider and your cardiologist. Future medication refills should come from your PCP or Cardiologist.   You should see your primary care provider in 2-4 weeks after discharge.   It is important to see your cardiologist about 4-6 weeks after discharge.    If you do not hear from a  in 7 days, please call [phone] (choose option 1) and request to be seen with a general cardiologist or someone that you have seen in the past.   If there is a need to return to see CT Surgery please call our  at [phone].    SURGICAL QUESTIONS  Please call Bartolome Azul, Kasia Canseco, More Calvert, Jennifer Coates or Shiloh Hughes with surgical recovery and medication questions, their phone numbers are listed below.  They will assist you with your needs and contact other surgery care team members as indicated.    On weekends or after hours, please call [phone] and ask the  to page the Cardiothoracic Surgery fellow on call.      Thank you,    Your Cardiothoracic Surgery Team   Bartolome Azul RN Care Coordinator - [phone]  Kasia Canseco RN Care Coordinator - [phone]   Shiloh Hughes, RN Care Coordinator - [phone]   Jennifer Coates RN Care Coordinator - [phone]  More Calvert, DAXA Care Coordinator - [phone]

## 2023-07-26 NOTE — PROGRESS NOTES
Resumed cares from [id number]. Pt VSS and sleeping. Oxy x1 for 8/10 pain. Large BM (writer visualized). Doesn't have a ride to discharge tonight so likely discharge in the AM.

## 2023-07-28 ENCOUNTER — TELEPHONE (OUTPATIENT)
Dept: CARDIOLOGY | Facility: CLINIC | Age: 28
End: 2023-07-28
Payer: COMMERCIAL

## 2023-07-28 NOTE — TELEPHONE ENCOUNTER
ACTIVITY  How is your activity tolerance? Patient walking around the home several times per day; tolerating activity well.     POST OP MONITORING  How is your pain on a 0-10 scale, how are you managing your pain? Pain rated 6/10 patient is taking tylenol and oxycodone for pain relief; patient instructed to take tylenol and robaxin as scheduled every 6 hours; if breakthrough pain present use oxycodone but to wean off of the narcotics as soon as pain manageable; patient instructed to use ice packs and lidocaine patches to help with pain management; instructed not to overwork his upper extremities which can cause increased discomfort in the evening. Patient verbalized understanding and agreed to the plan.       Are following your sternal precautions? Yes    Do you hear any clicking when you are moving or taking a deep breath?  No    Are you weighing yourself daily?  Patient will buy scale this weekend, instructed to check weight daily and keep a log, report increase in weight 3 lbs in 24 hours or more than 5 lb in one week.     Are you using the inspirometer? Yes       SIGNS AND SYMPTOMS OF INFECTION  1. INCREASE IN PAIN No  2. FEVER No  3. DRAINAGE No    If Yes, color:                 5. REDNESS No    6. SWELLING No  Sternal incision is healing well, wound edges approximated well. Call your surgeon or primary care provider's office immediately if experiencing any of the symptoms mentioned above. Chest tube incision sites are healing well; no s/s of infection present. Drainage No     ASSISTANCE  Do you have someone at home to assist you with your daily activities?  Yes      MEDICATIONS  Is someone helping you to set up your medications?  No  Do you have any questions about your medications?  Yes       FOLLOW UP  Are you scheduled for cardiac rehab? 8/1/23      You are scheduled to see our surgery advanced practice provider for post operative follow up on 8/11/23   You are scheduled to see your cardiologist on Patient  will establish care  You are scheduled to see your primary care physician on 8/9/23       CONTACT INFORMATION  Please feel free to call us with any other questions or symptoms that are concerning for you at , if it is after 4:30 in the afternoon, or a weekend please call [phone] and ask for the on call specialist.  We want to do everything we can to help prevent you needing to return to the ED, so please do not hesitate to call us.

## 2023-08-01 ENCOUNTER — HOSPITAL ENCOUNTER (OUTPATIENT)
Dept: CARDIAC REHAB | Facility: HOSPITAL | Age: 28
Discharge: HOME | End: 2023-08-01
Attending: STUDENT IN AN ORGANIZED HEALTH CARE EDUCATION/TRAINING PROGRAM
Payer: COMMERCIAL

## 2023-08-01 DIAGNOSIS — Z95.2 HISTORY OF TRICUSPID VALVE REPLACEMENT WITH MECHANICAL VALVE: ICD-10-CM

## 2023-08-01 PROCEDURE — 93798 PHYS/QHP OP CAR RHAB W/ECG: CPT

## 2023-08-01 PROCEDURE — 93797 PHYS/QHP OP CAR RHAB WO ECG: CPT | Mod: XU

## 2023-08-07 NOTE — PROGRESS NOTES
CARDIOTHORACIC SURGERY FOLLOW-UP VISIT     Silvia Otero   1995   [id number]      Reason for visit:  Post-Op TVR with Dr. Nowak on 7/20/2023.    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  She was found to have severe TV stenosis and underwent redo sternotomy for TVR with a bioprosthetic valve and placement of an epicardial pacemaker lead.  Presents to clinic for a routine follow-up appointment after surgery. Hospital course was remarkable for TPW left in situ as they were sutured to the myocardium. Patient was discharged home on 7/26/23.    Since discharge, has been recovering well; she has been increasing her activity level.    Discharge weight 171 lbs; weight today 170 lbs; Appears euvolemic upon examination.  States pain is generally well controlled with APAP with occasional Robaxin and oxycodone use. Sleeping adequately.  Breathing has been progressively improving though she has noted some non-productive coughing induced by activity.  Denies SOB at rest.  Has had a good appetite. Discussed the importance of nutrition in healing and staying adequately hydrated. Having regular bowel movements without need for bowel regimen and voiding without difficulty.  Midline sternal incision healing well with no noted erythema; some small amount of pink drainage noted following showers. Denies sternal popping or clicking.  Reports she is not interested in participating in cardiac rehab despite counseling on its purpose.     PAST MEDICAL HISTORY:  Past Medical History:   Diagnosis Date    Cardiac pacemaker in situ     Complete heart block (H)     History of atrial myxoma, congenital     Metal foreign body in chest 07/2023    Temporary cardiac pacing wire cut and left in situ    Tricuspid regurgitation        PAST SURGICAL HISTORY:  Past Surgical History:   Procedure Laterality  Date    ATRIAL MYXOMA EXCISION      in infancy    EP PACEMAKER GENERATOR REPLACEMENT- DUAL N/A 02/24/2023    Procedure: Pacemaker Generator Replacement Dual;  Surgeon: Jordon Regan MD;  Location:  [facility]    REDO STERNOTOMY REPAIR VALVE TRICUSPID N/A 7/20/2023    Procedure: REPEAT MEDIAN STERNOTOMY, LYSIS OF ADHESIONS, CARDIOPULMONARY BYPASS, TRICUSPID VALVE REPLACEMENT, TRANSESOPHAGEAL ECHOCARDIOGRAM PER ANESTHESIA;  Surgeon: Bryce Nowak MD;  Location: [facility] OR    REDO STERNOTOMY REPLACE VALVE TRICUSPID N/A 7/20/2023    Procedure: possible Redo sternotomy replace valve tricuspid;  Surgeon: Bryce Nowak MD;  Location: [facility] OR    REPLACE VALVE TRICUSPID  2016       CURRENT MEDICATIONS:   Current Outpatient Medications   Medication    acetaminophen (TYLENOL) 500 MG tablet    aspirin (ASA) 81 MG EC tablet    ferrous sulfate (FEROSUL) 325 (65 Fe) MG tablet    gabapentin (NEURONTIN) 100 MG capsule    methocarbamol (ROBAXIN) 750 MG tablet    oxyCODONE (ROXICODONE) 5 MG tablet    pantoprazole (PROTONIX) 40 MG EC tablet    docusate sodium (COLACE) 100 MG capsule    polyethylene glycol (MIRALAX) 17 GM/Dose powder    psyllium (METAMUCIL/KONSYL) capsule     No current facility-administered medications for this visit.       ALLERGIES:    No Known Allergies    ROS:  Review of symptoms otherwise negative unless commented on in HPI.     LABS:  Last Basic Metabolic Panel:  Lab Results   Component Value Date     07/26/2023      Lab Results   Component Value Date    POTASSIUM 4.3 07/26/2023    POTASSIUM 3.3 07/20/2023    POTASSIUM 3.8 07/15/2023     Lab Results   Component Value Date    CHLORIDE 100 07/26/2023    CHLORIDE 109 07/15/2023     Lab Results   Component Value Date    DENIS 9.1 07/26/2023     Lab Results   Component Value Date    CO2 24 07/26/2023    CO2 22 07/15/2023     Lab Results   Component Value Date    BUN 12.6 07/26/2023    BUN 18 07/15/2023     Lab Results   Component Value Date    CR  "0.55 07/26/2023     Lab Results   Component Value Date     07/26/2023     07/21/2023    GLC 80 07/15/2023       Last CBC:   Lab Results   Component Value Date    WBC 6.3 07/26/2023     Lab Results   Component Value Date    RBC 3.26 07/26/2023     Lab Results   Component Value Date    HGB 7.8 07/26/2023     Lab Results   Component Value Date    HCT 27.2 07/26/2023     No components found for: MCT  Lab Results   Component Value Date    MCV 83 07/26/2023     Lab Results   Component Value Date    MCH 23.9 07/26/2023     Lab Results   Component Value Date    MCHC 28.7 07/26/2023     Lab Results   Component Value Date    RDW 18.2 07/26/2023     Lab Results   Component Value Date     07/26/2023       INR:  Lab Results   Component Value Date    INR 1.70 07/20/2023    INR 1.69 07/20/2023    INR 1.26 07/20/2023    INR 3.81 07/15/2023    INR 2.7 07/13/2023    INR 1.75 07/03/2023    INR 3.9 06/16/2023    INR 1.95 06/06/2023    INR 4.3 04/25/2023    INR 4.2 03/29/2023    INR 4.7 03/17/2023    INR 3.26 03/10/2023    INR 2.51 03/03/2023    INR 2.70 03/02/2023    INR 3.27 03/01/2023    INR 2.80 02/28/2023    INR 2.24 02/27/2023     IMAGING:  None    PHYSICAL EXAM:   /67 (BP Location: Right arm, Patient Position: Chair, Cuff Size: Adult Regular)   Pulse 70   Ht 1.774 m (5' 9.84\")   Wt 77.5 kg (170 lb 12.8 oz)   LMP 07/18/2023   SpO2 97%   BMI 24.62 kg/m      General: NAD, pleasant, normal mood and affect, calm, cooperative on exam  Neuro: alert & oriented, no focal deficits, stable gait, face symmetric, speech clear  CV: S1 S2, no murmurs, rubs or gallops, regular rate and rhythm, no peripheral edema  Pulm: bilateral breath sounds, clear to auscultation throughout posterior lung fields, unlabored breathing  Incision: incisions clean dry and intact without erythema, swelling or drainage, scab/skin glue matrix in place over large portion of wound; 2/4 chest tube sites well healed, 2 with scab/clot " material in place - 1 of which had a trace amount of serous drainage    PROCEDURES: None       ASSESSMENT/PLAN:  Silvia Otero is a 27 year old year old female status post redo sternotomy and bioprosthetic TVR with epicardial lead placement who returns to clinic for postop visit.   Old clot material mechanically debrided from chest tube site during visit to promote healing.      1. Surgically doing well overall.  Incisions are healing well with no signs of infection. Increasing activity and strength overall.   2. Hemodynamics are stable. No medication changes were needed today.  3. Get established with a local cardiologist; plan to follow up with him/her within 1 month after surgery  4. Encouraged to complete outpatient Cardiac Rehab  5. Encouraged to run warm, soapy water over incision to assist in loosening and removing remaining scab/skin glue over sternotomy incision.  6. Taper and discontinue prescription pain medications.  7. Continue sternal precautions for 12 weeks from surgery date.   8. No driving for 4 weeks from surgery date.     Postoperative restrictions have been reviewed and all of the patient's questions were answered. Our contact information was given to the patient if he should have any further questions/concerns. No further follow up is needed with us.  The total time spent with the patient was 35 minutes, > 50% of which was spent in counseling and coordination of care.    Yvette Prabhakar CNP, ACNPC-AG  Cardiothoracic Surgery  Pager: [phone]      JOVANA Christianson

## 2023-08-11 ENCOUNTER — OFFICE VISIT (OUTPATIENT)
Dept: CARDIOLOGY | Facility: CLINIC | Age: 28
End: 2023-08-11
Attending: INTERNAL MEDICINE
Payer: COMMERCIAL

## 2023-08-11 VITALS
HEIGHT: 70 IN | DIASTOLIC BLOOD PRESSURE: 67 MMHG | OXYGEN SATURATION: 97 % | SYSTOLIC BLOOD PRESSURE: 115 MMHG | BODY MASS INDEX: 24.45 KG/M2 | HEART RATE: 70 BPM | WEIGHT: 170.8 LBS

## 2023-08-11 DIAGNOSIS — I07.9 TRICUSPID VALVE DISEASE: Primary | ICD-10-CM

## 2023-08-11 PROCEDURE — 99024 POSTOP FOLLOW-UP VISIT: CPT

## 2023-08-11 PROCEDURE — G0463 HOSPITAL OUTPT CLINIC VISIT: HCPCS

## 2023-08-11 ASSESSMENT — PAIN SCALES - GENERAL: PAINLEVEL: SEVERE PAIN (6)

## 2023-08-11 NOTE — LETTER
8/11/2023      RE: Silvia Otero  [address]  [place] [address]       Dear Colleague,    Thank you for the opportunity to participate in the care of your patient, Silvia Otero, at the [facility] [place] [facility]. Please see a copy of my visit note below.    CARDIOTHORACIC SURGERY FOLLOW-UP VISIT     Silvia Otero   1995   [id number]      Reason for visit:  Post-Op TVR with Dr. Nowak on 7/20/2023.    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  She was found to have severe TV stenosis and underwent redo sternotomy for TVR with a bioprosthetic valve and placement of an epicardial pacemaker lead.  Presents to clinic for a routine follow-up appointment after surgery. Hospital course was remarkable for TPW left in situ as they were sutured to the myocardium. Patient was discharged home on 7/26/23.    Since discharge, has been recovering well; she has been increasing her activity level.    Discharge weight 171 lbs; weight today 170 lbs; Appears euvolemic upon examination.  States pain is generally well controlled with APAP with occasional Robaxin and oxycodone use. Sleeping adequately.  Breathing has been progressively improving though she has noted some non-productive coughing induced by activity.  Denies SOB at rest.  Has had a good appetite. Discussed the importance of nutrition in healing and staying adequately hydrated. Having regular bowel movements without need for bowel regimen and voiding without difficulty.  Midline sternal incision healing well with no noted erythema; some small amount of pink drainage noted following showers. Denies sternal popping or clicking.  Reports she is not interested in participating in cardiac rehab despite counseling on its purpose.     PAST  MEDICAL HISTORY:  Past Medical History:   Diagnosis Date    Cardiac pacemaker in situ     Complete heart block (H)     History of atrial myxoma, congenital     Metal foreign body in chest 07/2023    Temporary cardiac pacing wire cut and left in situ    Tricuspid regurgitation        PAST SURGICAL HISTORY:  Past Surgical History:   Procedure Laterality Date    ATRIAL MYXOMA EXCISION      in infancy    EP PACEMAKER GENERATOR REPLACEMENT- DUAL N/A 02/24/2023    Procedure: Pacemaker Generator Replacement Dual;  Surgeon: Jordon Regan MD;  Location:  [facility]    REDO STERNOTOMY REPAIR VALVE TRICUSPID N/A 7/20/2023    Procedure: REPEAT MEDIAN STERNOTOMY, LYSIS OF ADHESIONS, CARDIOPULMONARY BYPASS, TRICUSPID VALVE REPLACEMENT, TRANSESOPHAGEAL ECHOCARDIOGRAM PER ANESTHESIA;  Surgeon: Bryce Nowak MD;  Location: [facility] OR    REDO STERNOTOMY REPLACE VALVE TRICUSPID N/A 7/20/2023    Procedure: possible Redo sternotomy replace valve tricuspid;  Surgeon: rByce Nowak MD;  Location: [facility] OR    REPLACE VALVE TRICUSPID  2016       CURRENT MEDICATIONS:   Current Outpatient Medications   Medication    acetaminophen (TYLENOL) 500 MG tablet    aspirin (ASA) 81 MG EC tablet    ferrous sulfate (FEROSUL) 325 (65 Fe) MG tablet    gabapentin (NEURONTIN) 100 MG capsule    methocarbamol (ROBAXIN) 750 MG tablet    oxyCODONE (ROXICODONE) 5 MG tablet    pantoprazole (PROTONIX) 40 MG EC tablet    docusate sodium (COLACE) 100 MG capsule    polyethylene glycol (MIRALAX) 17 GM/Dose powder    psyllium (METAMUCIL/KONSYL) capsule     No current facility-administered medications for this visit.       ALLERGIES:    No Known Allergies    ROS:  Review of symptoms otherwise negative unless commented on in HPI.     LABS:  Last Basic Metabolic Panel:  Lab Results   Component Value Date     07/26/2023      Lab Results   Component Value Date    POTASSIUM 4.3 07/26/2023    POTASSIUM 3.3 07/20/2023    POTASSIUM 3.8 07/15/2023     Lab  "Results   Component Value Date    CHLORIDE 100 07/26/2023    CHLORIDE 109 07/15/2023     Lab Results   Component Value Date    DENIS 9.1 07/26/2023     Lab Results   Component Value Date    CO2 24 07/26/2023    CO2 22 07/15/2023     Lab Results   Component Value Date    BUN 12.6 07/26/2023    BUN 18 07/15/2023     Lab Results   Component Value Date    CR 0.55 07/26/2023     Lab Results   Component Value Date     07/26/2023     07/21/2023    GLC 80 07/15/2023       Last CBC:   Lab Results   Component Value Date    WBC 6.3 07/26/2023     Lab Results   Component Value Date    RBC 3.26 07/26/2023     Lab Results   Component Value Date    HGB 7.8 07/26/2023     Lab Results   Component Value Date    HCT 27.2 07/26/2023     No components found for: MCT  Lab Results   Component Value Date    MCV 83 07/26/2023     Lab Results   Component Value Date    MCH 23.9 07/26/2023     Lab Results   Component Value Date    MCHC 28.7 07/26/2023     Lab Results   Component Value Date    RDW 18.2 07/26/2023     Lab Results   Component Value Date     07/26/2023       INR:  Lab Results   Component Value Date    INR 1.70 07/20/2023    INR 1.69 07/20/2023    INR 1.26 07/20/2023    INR 3.81 07/15/2023    INR 2.7 07/13/2023    INR 1.75 07/03/2023    INR 3.9 06/16/2023    INR 1.95 06/06/2023    INR 4.3 04/25/2023    INR 4.2 03/29/2023    INR 4.7 03/17/2023    INR 3.26 03/10/2023    INR 2.51 03/03/2023    INR 2.70 03/02/2023    INR 3.27 03/01/2023    INR 2.80 02/28/2023    INR 2.24 02/27/2023     IMAGING:  None    PHYSICAL EXAM:   /67 (BP Location: Right arm, Patient Position: Chair, Cuff Size: Adult Regular)   Pulse 70   Ht 1.774 m (5' 9.84\")   Wt 77.5 kg (170 lb 12.8 oz)   LMP 07/18/2023   SpO2 97%   BMI 24.62 kg/m      General: NAD, pleasant, normal mood and affect, calm, cooperative on exam  Neuro: alert & oriented, no focal deficits, stable gait, face symmetric, speech clear  CV: S1 S2, no murmurs, rubs or " gallops, regular rate and rhythm, no peripheral edema  Pulm: bilateral breath sounds, clear to auscultation throughout posterior lung fields, unlabored breathing  Incision: incisions clean dry and intact without erythema, swelling or drainage, scab/skin glue matrix in place over large portion of wound; 2/4 chest tube sites well healed, 2 with scab/clot material in place - 1 of which had a trace amount of serous drainage    PROCEDURES: None       ASSESSMENT/PLAN:  Silvia Otero is a 27 year old year old female status post redo sternotomy and bioprosthetic TVR with epicardial lead placement who returns to clinic for postop visit.   Old clot material mechanically debrided from chest tube site during visit to promote healing.      1. Surgically doing well overall.  Incisions are healing well with no signs of infection. Increasing activity and strength overall.   2. Hemodynamics are stable. No medication changes were needed today.  3. Get established with a local cardiologist; plan to follow up with him/her within 1 month after surgery  4. Encouraged to complete outpatient Cardiac Rehab  5. Encouraged to run warm, soapy water over incision to assist in loosening and removing remaining scab/skin glue over sternotomy incision.  6. Taper and discontinue prescription pain medications.  7. Continue sternal precautions for 12 weeks from surgery date.   8. No driving for 4 weeks from surgery date.     Postoperative restrictions have been reviewed and all of the patient's questions were answered. Our contact information was given to the patient if he should have any further questions/concerns. No further follow up is needed with us.  The total time spent with the patient was 35 minutes, > 50% of which was spent in counseling and coordination of care.    Yvette Prabhakar CNP, ACNPC-AG  Cardiothoracic Surgery  Pager: [phone]        America Christianson       Please do not hesitate to contact me if you have any  questions/concerns.     Sincerely,     Cardiovascular Thoracic Surgery

## 2023-08-11 NOTE — NURSING NOTE
Chief Complaint   Patient presents with    Follow Up     [facility] Post-Op- redo sternotomy & tissue TVR Dr Eliu Ann were taken and medications reconciled.    Aram Flores, EMT  1:26 PM

## 2023-08-13 ENCOUNTER — HEALTH MAINTENANCE LETTER (OUTPATIENT)
Age: 28
End: 2023-08-13

## 2023-10-06 ENCOUNTER — HOSPITAL ENCOUNTER (EMERGENCY)
Facility: HOSPITAL | Age: 28
Discharge: HOME | End: 2023-10-06
Attending: EMERGENCY MEDICINE | Admitting: EMERGENCY MEDICINE
Payer: COMMERCIAL

## 2023-10-06 ENCOUNTER — APPOINTMENT (OUTPATIENT)
Dept: RADIOLOGY | Facility: HOSPITAL | Age: 28
End: 2023-10-06
Attending: EMERGENCY MEDICINE
Payer: COMMERCIAL

## 2023-10-06 ENCOUNTER — APPOINTMENT (OUTPATIENT)
Dept: CT IMAGING | Facility: HOSPITAL | Age: 28
End: 2023-10-06
Attending: EMERGENCY MEDICINE
Payer: COMMERCIAL

## 2023-10-06 VITALS
RESPIRATION RATE: 16 BRPM | WEIGHT: 160 LBS | HEART RATE: 74 BPM | TEMPERATURE: 98 F | BODY MASS INDEX: 23.06 KG/M2 | OXYGEN SATURATION: 100 % | SYSTOLIC BLOOD PRESSURE: 113 MMHG | DIASTOLIC BLOOD PRESSURE: 61 MMHG

## 2023-10-06 DIAGNOSIS — G24.3 SPASMODIC TORTICOLLIS: ICD-10-CM

## 2023-10-06 LAB
ANION GAP SERPL CALCULATED.3IONS-SCNC: 13 MMOL/L (ref 7–15)
BASO+EOS+MONOS # BLD AUTO: ABNORMAL 10*3/UL
BASO+EOS+MONOS NFR BLD AUTO: ABNORMAL %
BASOPHILS # BLD AUTO: 0 10E3/UL (ref 0–0.2)
BASOPHILS NFR BLD AUTO: 0 %
BUN SERPL-MCNC: 15.2 MG/DL (ref 6–20)
CALCIUM SERPL-MCNC: 10.1 MG/DL (ref 8.6–10)
CHLORIDE SERPL-SCNC: 103 MMOL/L (ref 98–107)
CREAT SERPL-MCNC: 0.5 MG/DL (ref 0.51–0.95)
D DIMER PPP FEU-MCNC: 0.74 UG/ML FEU (ref 0–0.5)
DEPRECATED HCO3 PLAS-SCNC: 23 MMOL/L (ref 22–29)
EGFRCR SERPLBLD CKD-EPI 2021: >90 ML/MIN/1.73M2
EOSINOPHIL # BLD AUTO: 0.1 10E3/UL (ref 0–0.7)
EOSINOPHIL NFR BLD AUTO: 4 %
ERYTHROCYTE [DISTWIDTH] IN BLOOD BY AUTOMATED COUNT: 15.1 % (ref 10–15)
GLUCOSE SERPL-MCNC: 115 MG/DL (ref 70–99)
HCT VFR BLD AUTO: 37.1 % (ref 35–47)
HGB BLD-MCNC: 11.9 G/DL (ref 11.7–15.7)
IMM GRANULOCYTES # BLD: 0 10E3/UL
IMM GRANULOCYTES NFR BLD: 0 %
LYMPHOCYTES # BLD AUTO: 1 10E3/UL (ref 0.8–5.3)
LYMPHOCYTES NFR BLD AUTO: 30 %
MCH RBC QN AUTO: 25.7 PG (ref 26.5–33)
MCHC RBC AUTO-ENTMCNC: 32.1 G/DL (ref 31.5–36.5)
MCV RBC AUTO: 80 FL (ref 78–100)
MONOCYTES # BLD AUTO: 0.2 10E3/UL (ref 0–1.3)
MONOCYTES NFR BLD AUTO: 6 %
NEUTROPHILS # BLD AUTO: 1.9 10E3/UL (ref 1.6–8.3)
NEUTROPHILS NFR BLD AUTO: 60 %
NRBC # BLD AUTO: 0 10E3/UL
NRBC BLD AUTO-RTO: 0 /100
PLATELET # BLD AUTO: 183 10E3/UL (ref 150–450)
POTASSIUM SERPL-SCNC: 3.6 MMOL/L (ref 3.4–5.3)
RBC # BLD AUTO: 4.63 10E6/UL (ref 3.8–5.2)
SODIUM SERPL-SCNC: 139 MMOL/L (ref 135–145)
TROPONIN T SERPL HS-MCNC: <6 NG/L
WBC # BLD AUTO: 3.2 10E3/UL (ref 4–11)

## 2023-10-06 PROCEDURE — 80048 BASIC METABOLIC PNL TOTAL CA: CPT | Performed by: EMERGENCY MEDICINE

## 2023-10-06 PROCEDURE — 99285 EMERGENCY DEPT VISIT HI MDM: CPT | Mod: 25

## 2023-10-06 PROCEDURE — 96375 TX/PRO/DX INJ NEW DRUG ADDON: CPT | Mod: 59

## 2023-10-06 PROCEDURE — 84484 ASSAY OF TROPONIN QUANT: CPT | Performed by: EMERGENCY MEDICINE

## 2023-10-06 PROCEDURE — 96374 THER/PROPH/DIAG INJ IV PUSH: CPT

## 2023-10-06 PROCEDURE — 36415 COLL VENOUS BLD VENIPUNCTURE: CPT | Performed by: EMERGENCY MEDICINE

## 2023-10-06 PROCEDURE — 85025 COMPLETE CBC W/AUTO DIFF WBC: CPT | Performed by: EMERGENCY MEDICINE

## 2023-10-06 PROCEDURE — 250N000011 HC RX IP 250 OP 636: Mod: JZ | Performed by: EMERGENCY MEDICINE

## 2023-10-06 PROCEDURE — 250N000011 HC RX IP 250 OP 636: Performed by: EMERGENCY MEDICINE

## 2023-10-06 PROCEDURE — 71275 CT ANGIOGRAPHY CHEST: CPT

## 2023-10-06 PROCEDURE — 85379 FIBRIN DEGRADATION QUANT: CPT | Performed by: EMERGENCY MEDICINE

## 2023-10-06 PROCEDURE — 93005 ELECTROCARDIOGRAM TRACING: CPT | Performed by: EMERGENCY MEDICINE

## 2023-10-06 PROCEDURE — 72040 X-RAY EXAM NECK SPINE 2-3 VW: CPT

## 2023-10-06 RX ORDER — DIAZEPAM 10 MG/2ML
2.5 INJECTION, SOLUTION INTRAMUSCULAR; INTRAVENOUS ONCE
Status: COMPLETED | OUTPATIENT
Start: 2023-10-06 | End: 2023-10-06

## 2023-10-06 RX ORDER — CYCLOBENZAPRINE HCL 10 MG
10 TABLET ORAL 3 TIMES DAILY PRN
Qty: 20 TABLET | Refills: 0 | Status: SHIPPED | OUTPATIENT
Start: 2023-10-06 | End: 2023-10-13

## 2023-10-06 RX ORDER — OXYCODONE AND ACETAMINOPHEN 5; 325 MG/1; MG/1
1 TABLET ORAL EVERY 6 HOURS PRN
Qty: 12 TABLET | Refills: 0 | Status: SHIPPED | OUTPATIENT
Start: 2023-10-06 | End: 2023-10-09

## 2023-10-06 RX ORDER — ONDANSETRON 2 MG/ML
4 INJECTION INTRAMUSCULAR; INTRAVENOUS ONCE
Status: COMPLETED | OUTPATIENT
Start: 2023-10-06 | End: 2023-10-06

## 2023-10-06 RX ORDER — IBUPROFEN 600 MG/1
600 TABLET, FILM COATED ORAL EVERY 6 HOURS PRN
Qty: 28 TABLET | Refills: 0 | Status: SHIPPED | OUTPATIENT
Start: 2023-10-06 | End: 2024-04-17

## 2023-10-06 RX ORDER — IOPAMIDOL 755 MG/ML
80 INJECTION, SOLUTION INTRAVASCULAR ONCE
Status: COMPLETED | OUTPATIENT
Start: 2023-10-06 | End: 2023-10-06

## 2023-10-06 RX ADMIN — HYDROMORPHONE HYDROCHLORIDE 1 MG: 1 INJECTION, SOLUTION INTRAMUSCULAR; INTRAVENOUS; SUBCUTANEOUS at 01:31

## 2023-10-06 RX ADMIN — IOPAMIDOL 80 ML: 755 INJECTION, SOLUTION INTRAVENOUS at 03:36

## 2023-10-06 RX ADMIN — DIAZEPAM 2.5 MG: 5 INJECTION, SOLUTION INTRAMUSCULAR; INTRAVENOUS at 02:03

## 2023-10-06 ASSESSMENT — ACTIVITIES OF DAILY LIVING (ADL)
ADLS_ACUITY_SCORE: 35
ADLS_ACUITY_SCORE: 35

## 2023-10-06 ASSESSMENT — ENCOUNTER SYMPTOMS: NECK PAIN: 1

## 2023-10-06 NOTE — Clinical Note
Silvia Otero was seen and treated in our emergency department on 10/6/2023.  She may return to work on 10/09/2023.       If you have any questions or concerns, please don't hesitate to call.      Marino Hyman MD

## 2023-10-06 NOTE — ED PROVIDER NOTES
EMERGENCY DEPARTMENT ENCOUNTER      NAME: Silvia Otero  AGE: 27 year old female  YOB: 1995  MRN: [id number]  EVALUATION DATE & TIME: 10/6/2023 12:46 AM    PCP: Aemrica Christianson    ED PROVIDER: Marino Hyman M.D.      Chief Complaint   Patient presents with    Neck Pain         FINAL IMPRESSION:  1.  Acute right neck torticollis.      ED COURSE & MEDICAL DECISION MAKIN:55 AM I met with the patient to gather history and to perform my initial exam. We discussed plans for the ED course, including diagnostic testing and treatment. PPE worn: cloth mask..  No fall, accident or trauma.  Patient noted yesterday morning pain in the right neck and points to the trapezius and sternocleidomastoid muscles.  It hurts to move that area.  Less pain if she does not move it.  No relief with massage.  Of significance, patient underwent  a replacement of a tricuspid valve.  3:58 AM I rechecked and updated the patient.  Patient feeling somewhat better after medications.  Cervical spine x-rays were unremarkable.  Chemistries unremarkable.  Troponin negative.  CBC negative.  D-dimer elevated.  EKG unremarkable.  CAT scan of the chest shows no evidence for PE.  And an incidental left upper lobe small nodule is noted and they recommend 6-month follow-up.  Results communicated to the patient and patient will be discharged home.  Patient in agreement.    Pertinent Labs & Imaging studies reviewed. (See chart for details)  27 year old female presents to the Emergency Department for evaluation of right-sided neck pain.  At the conclusion of the encounter I discussed the results of all of the tests and the disposition. The questions were answered. The patient or family acknowledged understanding and was agreeable with the care plan.              Medical Decision Making    History:  Supplemental history from: Documented in chart, if applicable  External Record(s) reviewed: Both inpatient and outpatient  computer records were reviewed.    Work Up:  Chart documentation includes differential considered and any EKGs or imaging independently interpreted by provider, where specified.  Differential diagnosis includes torticollis, other muscle strain, cervical spine injury, etc.  In additional to work up documented, I considered the following work up: Documented in chart, if applicable.    External consultation:  Discussion of management with another provider: Documented in chart, if applicable    Complicating factors:  Care impacted by chronic illness: Heart Disease  Care affected by social determinants of health: Access to healthcare.    Disposition considerations: Anticipate discharge home on medications.        MEDICATIONS GIVEN IN THE EMERGENCY:  Medications   ondansetron (ZOFRAN) injection 4 mg (4 mg Intravenous Not Given 10/6/23 0313)   diazepam (VALIUM) injection 2.5 mg (2.5 mg Intravenous $Given 10/6/23 0203)   HYDROmorphone (DILAUDID) injection 1 mg (1 mg Intravenous $Given 10/6/23 0131)   iopamidol (ISOVUE-370) solution 80 mL (80 mLs Intravenous $Given 10/6/23 0336)       NEW PRESCRIPTIONS STARTED AT TODAY'S ER VISIT  New Prescriptions    No medications on file          =================================================================    HPI    Patient information was obtained from: Patient, Patient's mother    Use of : N/A         Silvia Otero is a 27 year old female with a pertinent history of atrial flutter who presents to this ED via walking for evaluation of right-sided neck and shoulder pain starting yesterday morning.    Patient reports that her pain is so severe to the point where she can't move her neck, otherwise it will provoke her pain. She states that it hurts to reach her arms and when changing clothes. Patient mentions that she has been working overnights for 8 days straight where there aren't any comfortable places to sit. Patient's mother mentions that patient had a heart operation  on 7/20/23 (~2 months ago). Patient reports taking tylenol but with no improvement. She denies any allergies to pain medications. She denies any falls, accidents, or trauma. Patient denies any chance of pregnancy. Patient denies any bilateral arm pain.    She does not identify any waxing or waning symptoms otherwise, exacerbating or alleviating features, associated symptoms except as mentioned.     REVIEW OF SYSTEMS   Review of Systems   Musculoskeletal:  Positive for neck pain (right-sided).        Positive for right-sided shoulder pain. Negative for bilateral arm pain.       PAST MEDICAL HISTORY:  Past Medical History:   Diagnosis Date    Cardiac pacemaker in situ     Complete heart block (H)     History of atrial myxoma, congenital     Metal foreign body in chest 07/2023    Temporary cardiac pacing wire cut and left in situ    Tricuspid regurgitation        PAST SURGICAL HISTORY:  Past Surgical History:   Procedure Laterality Date    ATRIAL MYXOMA EXCISION      in infancy    EP PACEMAKER GENERATOR REPLACEMENT- DUAL N/A 02/24/2023    Procedure: Pacemaker Generator Replacement Dual;  Surgeon: Jordon Regan MD;  Location:  HEART CARDIAC CATH LAB    REDO STERNOTOMY REPAIR VALVE TRICUSPID N/A 7/20/2023    Procedure: REPEAT MEDIAN STERNOTOMY, LYSIS OF ADHESIONS, CARDIOPULMONARY BYPASS, TRICUSPID VALVE REPLACEMENT, TRANSESOPHAGEAL ECHOCARDIOGRAM PER ANESTHESIA;  Surgeon: Bryce Nowak MD;  Location: [facility] OR    REDO STERNOTOMY REPLACE VALVE TRICUSPID N/A 7/20/2023    Procedure: possible Redo sternotomy replace valve tricuspid;  Surgeon: Bryce Nowak MD;  Location: [facility] OR    REPLACE VALVE TRICUSPID  2016           CURRENT MEDICATIONS:    acetaminophen (TYLENOL) 500 MG tablet  aspirin (ASA) 81 MG EC tablet  docusate sodium (COLACE) 100 MG capsule  ferrous sulfate (FEROSUL) 325 (65 Fe) MG tablet  gabapentin (NEURONTIN) 100 MG capsule  methocarbamol (ROBAXIN) 750 MG tablet  oxyCODONE (ROXICODONE) 5 MG  tablet  polyethylene glycol (MIRALAX) 17 GM/Dose powder        ALLERGIES:  No Known Allergies    FAMILY HISTORY:  Family History   Problem Relation Age of Onset    Anesthesia Reaction No family hx of     Deep Vein Thrombosis (DVT) No family hx of        SOCIAL HISTORY:   Social History     Socioeconomic History    Marital status:      Spouse name: None    Number of children: None    Years of education: None    Highest education level: None   Tobacco Use    Smoking status: Never    Smokeless tobacco: Never   Vaping Use    Vaping Use: Never used   Substance and Sexual Activity    Alcohol use: Never    Drug use: Never   No drugs, alcohol, or tobacco.    VITALS:  /61   Pulse 74   Temp 98  F (36.7  C) (Oral)   Resp 16   Wt 72.6 kg (160 lb)   SpO2 100%   BMI 23.06 kg/m      PHYSICAL EXAM    Vital Signs:  /61   Pulse 74   Temp 98  F (36.7  C) (Oral)   Resp 16   Wt 72.6 kg (160 lb)   SpO2 100%   BMI 23.06 kg/m    General:  On entering the room she appears to be in significant pain or discomfort holding the right side of her neck.  Neck:  Neck supple with full range of motion and nontender everywhere, except for tenderness in the right paracervical muscles into the right trapezius muscles and right sternocleidomastoid.  This pain reproduced with palpation and movement.  Back:  Back and spine are nontender.  No costovertebral angle tenderness.    HEENT:  Oropharynx clear with moist mucous membranes.  HEENT unremarkable.    Pulmonary:  Chest clear to auscultation without rhonchi rales or wheezing.    Cardiovascular:  Cardiac regular rate and rhythm without murmurs rubs or gallops.    Abdomen:  Abdomen soft nontender.  There is no rebound or guarding.    Muskuloskeletal:  She moves all 4 without any difficulty and has normal neurovascular exams.  Extremities without clubbing, cyanosis, or edema.  Legs and calves are nontender.    Neuro:  She is alert and oriented ×3 and moves all extremities  symmetrically.    Psych:  Normal affect.    Skin:  Unremarkable and warm and dry.       LAB:  All pertinent labs reviewed and interpreted.  Labs Ordered and Resulted from Time of ED Arrival to Time of ED Departure   BASIC METABOLIC PANEL - Abnormal       Result Value    Sodium 139      Potassium 3.6      Chloride 103      Carbon Dioxide (CO2) 23      Anion Gap 13      Urea Nitrogen 15.2      Creatinine 0.50 (*)     GFR Estimate >90      Calcium 10.1 (*)     Glucose 115 (*)    D DIMER QUANTITATIVE - Abnormal    D-Dimer Quantitative 0.74 (*)    CBC WITH PLATELETS AND DIFFERENTIAL - Abnormal    WBC Count 3.2 (*)     RBC Count 4.63      Hemoglobin 11.9      Hematocrit 37.1      MCV 80      MCH 25.7 (*)     MCHC 32.1      RDW 15.1 (*)     Platelet Count 183      % Neutrophils 60      % Lymphocytes 30      % Monocytes 6      Mids % (Monos, Eos, Basos)        % Eosinophils 4      % Basophils 0      % Immature Granulocytes 0      NRBCs per 100 WBC 0      Absolute Neutrophils 1.9      Absolute Lymphocytes 1.0      Absolute Monocytes 0.2      Mids Abs (Monos, Eos, Basos)        Absolute Eosinophils 0.1      Absolute Basophils 0.0      Absolute Immature Granulocytes 0.0      Absolute NRBCs 0.0     TROPONIN T, HIGH SENSITIVITY - Normal    Troponin T, High Sensitivity <6         RADIOLOGY:  Reviewed all pertinent imaging. Please see official radiology report.  CT Chest Pulmonary Embolism w Contrast   Final Result   IMPRESSION:   1.  There is no pulmonary embolus, aortic aneurysm or dissection. No acute abnormality.   2.  New small subpleural nodule in the left upper lobe laterally. Six-month follow-up recommended.      Cervical spine XR, 2-3 views   Final Result   IMPRESSION: Broad dextroscoliosis. No fracture. Normal vertebral heights and alignment. Normal disc spaces and facets for age. Normal extraspinal structures.                       EKG:        I have independently reviewed and interpreted the EKG(s) documented  above.    PROCEDURES:         I, Shana Dominguez, am serving as a scribe to document services personally performed by Dr. Hyman based on my observation and the provider's statements to me. I, Marino Hyman MD attest that Shana Dominguez is acting in a scribe capacity, has observed my performance of the services and has documented them in accordance with my direction.    Marino Hyman M.D.  Emergency Medicine  [facility] EMERGENCY DEPARTMENT  [address] [place] [id number]  [phone]  Dept: [phone]     Marino Hyman MD  10/06/23 0401

## 2023-10-06 NOTE — ED TRIAGE NOTES
Pt here for c/o neck pain starting yesterday morning. Pain is located on R side of neck and is worse with movement. Pt has flat affect and family with pt. Denies any injury or trauma to the neck.

## 2023-10-06 NOTE — DISCHARGE INSTRUCTIONS
Avoid bending neck for the next few days.  Ice off-and-on to sore areas can help with pain.  Ibuprofen as prescribed.  Percocet as prescribed.  Do not drive with this.  Flexeril as prescribed.  Do not drive with this.  See your clinic for follow-up in the next week.  See them sooner if worse or problems.

## 2023-10-14 LAB
ATRIAL RATE - MUSE: 76 BPM
DIASTOLIC BLOOD PRESSURE - MUSE: NORMAL MMHG
INTERPRETATION ECG - MUSE: NORMAL
P AXIS - MUSE: 37 DEGREES
PR INTERVAL - MUSE: NORMAL MS
QRS DURATION - MUSE: 168 MS
QT - MUSE: 500 MS
QTC - MUSE: 540 MS
R AXIS - MUSE: 186 DEGREES
SYSTOLIC BLOOD PRESSURE - MUSE: NORMAL MMHG
T AXIS - MUSE: -6 DEGREES
VENTRICULAR RATE- MUSE: 70 BPM

## 2023-10-25 ENCOUNTER — OFFICE VISIT (OUTPATIENT)
Dept: CARDIOLOGY | Facility: CLINIC | Age: 28
End: 2023-10-25
Attending: INTERNAL MEDICINE
Payer: COMMERCIAL

## 2023-10-25 VITALS
HEART RATE: 71 BPM | HEIGHT: 69 IN | WEIGHT: 170.4 LBS | BODY MASS INDEX: 25.24 KG/M2 | SYSTOLIC BLOOD PRESSURE: 101 MMHG | DIASTOLIC BLOOD PRESSURE: 66 MMHG

## 2023-10-25 DIAGNOSIS — I36.1 NONRHEUMATIC TRICUSPID VALVE REGURGITATION: ICD-10-CM

## 2023-10-25 DIAGNOSIS — I07.9 TRICUSPID VALVE DISEASE: Primary | ICD-10-CM

## 2023-10-25 DIAGNOSIS — Z95.0 CARDIAC PACEMAKER IN SITU: ICD-10-CM

## 2023-10-25 PROCEDURE — 99215 OFFICE O/P EST HI 40 MIN: CPT | Mod: 25 | Performed by: NURSE PRACTITIONER

## 2023-10-25 NOTE — LETTER
10/25/2023    Physician No Ref-Primary  No address on file    RE: Silvia Otero       Dear Colleague,     I had the pleasure of seeing Silvia Otero in the [facility].    Electrophysiology Clinic Progress Note  Silvia Otero MRN# [id number]   YOB: 1995 Age: 27 year old     Primary cardiologist: Dr. Koch    Reason for visit: Pacemaker follow up    History of presenting illness:    Silvia Otero is a pleasant 27 year old patient with past medical history significant for:    Atrial myxoma involving TV requiring resection in 1997 complicated by tricuspid regurgitation and complete heart block.  She underwent a tricuspid valve replacement in 2001 and in 2016 underwent a mechanical tricuspid valve given that she outgrew the previous valve.  At that time she also had a epicardial lead [employer] permanent pacemaker done in [place]. S/p 3rd tricuspid valve replacement for TV stenosis in 7/2023 by Dr. Nowak at [facility] with 29 mm [employer] Epic valve for  Complete heart block: s/p single-chamber [employer] permanent pacemaker at the time of CV surgery in 2016 with an epicardial lead and placement of the device in her mid upper abdomen.   Atrial flutter: unknown chronicity  Anemia    She was admitted to [facility] in 2/2023 with chest pain and found to have stenosis of on the mechanical valve TV and concern for thrombus.  During his stay he underwent valvular fluoroscopy that showed restricted valve motion and CT showed no clear evidence of thrombus.  Device was checked and she was found to have reached ALLISON therefore she underwent a generator replacement of a single chamber abdominal permanent pacemaker.  Upon discharge she was referred to CV surgery with an INR goal of 3.5-4.0 with repeat echo noting that her tricuspid valve gradient remains elevated.    Therefore, she was referred to CV surgery and evaluated by Dr. Nowak and underwent a third to redo sternotomy with removal of previous  mechanical tricuspid valve and replacement with a bioprosthetic valve.  Up until her valve replacement she was maintained on warfarin.    Today she returns for an overdue pacemaker follow-up.  Overall she is feeling well without any concerns at her sternotomy site or pacemaker site.  She has no concerns overall from a cardiopulmonary standpoint.    Diagnotic studies:  Device check (10/25/2023): AP 99%, VVIR 70, device revealed chronic atrial flutter  Echocardiogram (7/25/2023): showed LV EF 60-65%, moderate reduction of RV function, well seated TV valve with mean gradient 6 mmHg. Abnormal non-specific septal motion noted.   Echocardiogram (7/3/2023) recent echo showed LV EF 55-60%, normal RV function, mechanical TV with average gradient of 14 mmHg.   Echocardiogram (3/20/2023): LVEF 60-65% RV mildly dilated and RA moderately dilated. S/P mechanical TVR; leaflets poorly visualized. Elevated mean gradient 15 mmHg at HR 70 bpm worrisome for prosthesis malfunction. Compared to the piror study on 2/27/2023, no change in TV gradient.          Assessment and Plan:     ASSESSMENT:    Atrial myxoma with history of TV resection in 1997  Underwent tricuspid valve replacement in 2001 and repeat mechanical valve replacement in 2016 (grew out of valve)  S/p second redo tricuspid valve replacement with bioprosthetic valve 7/2023 by Dr. Nowak at [facility] with 29 mm [employer] Epic valve    Atrial flutter  Upon review of her previous ECGs it appears this is paroxysmal.  Dr. Koch kindly reviewed her previous ECGs as well noting that she was in atrial flutter and February 2023 during admission but most recent ECG from 10/6/2023 ventricular paced with underlying sinus rhythm.  Previously anticoagulated on Coumadin for mechanical tricuspid valve.  Coumadin was discontinued in 7/2023 at the time of bioprosthetic valve replacement  [id number] score of 1 (gender)    Complete heart block  Status post permanent pacemaker in 2016 with LV  "epicardial lead and pacemaker placed in mid upper abdomen  Device reached ALLISON in 9/2022 and underwent generator change in 2/2023    PLAN:     Recommend establishing care with general cardiology in 3-4 months with repeat echo that will be 1 year out post TVR.  The patient met PORSCHE Forrester CNP (who has left our group) as well as Dr. Diego during her admission and 2/2023.   A atrial flutter ablation could be pursued in the future given patient's young age.  Although, the atrial flutter is of unknown chronicity.  Follow with routine device checks     Orders this Visit:  Orders Placed This Encounter   Procedures    Follow-Up with Cardiology    Follow-Up with Cardiology    Echocardiogram Complete     No orders of the defined types were placed in this encounter.    There are no discontinued medications.    Today's clinic visit entailed:  Review of prior external note(s) from [facility]  Review of the result(s) of each unique test - Echo, device checks   Ordering of each unique test  Prescription drug management  40 minutes spent by me on the date of the encounter doing chart review, history and exam, documentation and further activities per the note  Provider  Link to [facility] Help Grid     The level of medical decision making during this visit was of moderate complexity.           Review of Systems:     Review of Systems:  Skin:  Negative     Eyes:  Positive for glasses  ENT:  Negative    Respiratory:  Negative    Cardiovascular:  Negative;chest pain;palpitations;syncope or near-syncope;cyanosis;lightheadedness;dizziness;edema;fatigue    Gastroenterology: Negative    Genitourinary:  Negative    Musculoskeletal:  Negative    Neurologic:  Negative    Psychiatric:  Negative    Heme/Lymph/Imm:  Negative    Endocrine:  Negative              Physical Exam:     Vitals: /66   Pulse 71   Ht 1.753 m (5' 9\")   Wt 77.3 kg (170 lb 6.4 oz)   BMI 25.16 kg/m    Constitutional: Well nourished and in no apparent " distress.  Eyes: Pupils equal, round. Sclerae anicteric.   HEENT: Normocephalic, atraumatic.   Neck: Supple. JVD  Respiratory: Breathing non-labored. Lungs clear to auscultation bilaterally. No crackles, wheezes, rhonchi, or rales.  Cardiovascular: Regular rate and rhythm, normal S1 and S2. No murmur, rub, or gallop.  Skin: Warm, dry. No rashes, cyanosis, or xanthelasma.  Extremities: No edema  Neurologic: No gross motor deficits. Alert, awake, and oriented to person, place and time.  Psychiatric: Affect appropriate.        CURRENT MEDICATIONS:  Current Outpatient Medications   Medication Sig Dispense Refill    acetaminophen (TYLENOL) 500 MG tablet Take 2 tablets (1,000 mg) by mouth every 8 hours as needed for mild pain or pain 100 tablet 0    aspirin (ASA) 81 MG EC tablet Take 1 tablet (81 mg) by mouth daily 100 tablet 0    ferrous sulfate (FEROSUL) 325 (65 Fe) MG tablet Take 1 tablet (325 mg) by mouth daily (with breakfast) 30 tablet 0    methocarbamol (ROBAXIN) 750 MG tablet Take 1 tablet (750 mg) by mouth every 6 hours as needed for muscle spasms 120 tablet 0    oxyCODONE (ROXICODONE) 5 MG tablet Take 1 tablet (5 mg) by mouth every 8 hours as needed for moderate to severe pain 16 tablet 0    docusate sodium (COLACE) 100 MG capsule Take 1 capsule (100 mg) by mouth 2 times daily as needed for constipation (Patient not taking: Reported on 8/11/2023) 30 capsule 0    gabapentin (NEURONTIN) 100 MG capsule Take 1 capsule (100 mg) by mouth 3 times daily as needed for other (post-operative pain) (Patient not taking: Reported on 10/25/2023) 60 capsule 0    ibuprofen (ADVIL/MOTRIN) 600 MG tablet Take 1 tablet (600 mg) by mouth every 6 hours as needed for moderate pain (Patient not taking: Reported on 10/25/2023) 28 tablet 0    polyethylene glycol (MIRALAX) 17 GM/Dose powder Take 17 g by mouth daily (Patient not taking: Reported on 8/11/2023) 510 g 0       ALLERGIES  No Known Allergies      PAST MEDICAL HISTORY:  Past  Medical History:   Diagnosis Date    Cardiac pacemaker in situ     Complete heart block (H)     History of atrial myxoma, congenital     Metal foreign body in chest 07/2023    Temporary cardiac pacing wire cut and left in situ    Tricuspid regurgitation        PAST SURGICAL HISTORY:  Past Surgical History:   Procedure Laterality Date    ATRIAL MYXOMA EXCISION      in infancy    EP PACEMAKER GENERATOR REPLACEMENT- DUAL N/A 02/24/2023    Procedure: Pacemaker Generator Replacement Dual;  Surgeon: Jordon Regan MD;  Location: [facility] CARDIAC CATH LAB    REDO STERNOTOMY REPAIR VALVE TRICUSPID N/A 7/20/2023    Procedure: REPEAT MEDIAN STERNOTOMY, LYSIS OF ADHESIONS, CARDIOPULMONARY BYPASS, TRICUSPID VALVE REPLACEMENT, TRANSESOPHAGEAL ECHOCARDIOGRAM PER ANESTHESIA;  Surgeon: Bryce Nowak MD;  Location: [facility] OR    REDO STERNOTOMY REPLACE VALVE TRICUSPID N/A 7/20/2023    Procedure: possible Redo sternotomy replace valve tricuspid;  Surgeon: Bryce Nowak MD;  Location: [facility] OR    REPLACE VALVE TRICUSPID  2016       FAMILY HISTORY:  Family History   Problem Relation Age of Onset    Anesthesia Reaction No family hx of     Deep Vein Thrombosis (DVT) No family hx of        SOCIAL HISTORY:  Social History     Socioeconomic History    Marital status:      Spouse name: None    Number of children: None    Years of education: None    Highest education level: None   Tobacco Use    Smoking status: Never    Smokeless tobacco: Never   Vaping Use    Vaping Use: Never used   Substance and Sexual Activity    Alcohol use: Never    Drug use: Never     Thank you for allowing me to participate in the care of your patient.      Sincerely,     Melody Davis, PORSCHE [facility]  cc:   Dayne Garcia MD  [address]  [place] [id number]

## 2023-10-25 NOTE — PROGRESS NOTES
Electrophysiology Clinic Progress Note  Silvia Otero MRN# [id number]   YOB: 1995 Age: 27 year old     Primary cardiologist: Dr. Koch    Reason for visit: Pacemaker follow up    History of presenting illness:    Silvia Otero is a pleasant 27 year old patient with past medical history significant for:    Atrial myxoma involving TV requiring resection in 1997 complicated by tricuspid regurgitation and complete heart block.  She underwent a tricuspid valve replacement in 2001 and in 2016 underwent a mechanical tricuspid valve given that she outgrew the previous valve.  At that time she also had a epicardial lead [employer] permanent pacemaker done in [place]. S/p 3rd tricuspid valve replacement for TV stenosis in 7/2023 by Dr. Nowak at [facility] with 29 mm [employer] Epic valve for  Complete heart block: s/p single-chamber [employer] permanent pacemaker at the time of CV surgery in 2016 with an epicardial lead and placement of the device in her mid upper abdomen.   Atrial flutter: unknown chronicity  Anemia    She was admitted to [facility] in 2/2023 with chest pain and found to have stenosis of on the mechanical valve TV and concern for thrombus.  During his stay he underwent valvular fluoroscopy that showed restricted valve motion and CT showed no clear evidence of thrombus.  Device was checked and she was found to have reached ALLISON therefore she underwent a generator replacement of a single chamber abdominal permanent pacemaker.  Upon discharge she was referred to CV surgery with an INR goal of 3.5-4.0 with repeat echo noting that her tricuspid valve gradient remains elevated.    Therefore, she was referred to CV surgery and evaluated by Dr. Nowak and underwent a third to redo sternotomy with removal of previous mechanical tricuspid valve and replacement with a bioprosthetic valve.  Up until her valve replacement she was maintained on warfarin.    Today she returns for an overdue pacemaker follow-up.   Overall she is feeling well without any concerns at her sternotomy site or pacemaker site.  She has no concerns overall from a cardiopulmonary standpoint.    Diagnotic studies:  Device check (10/25/2023): AP 99%, VVIR 70, device revealed chronic atrial flutter  Echocardiogram (7/25/2023): showed LV EF 60-65%, moderate reduction of RV function, well seated TV valve with mean gradient 6 mmHg. Abnormal non-specific septal motion noted.   Echocardiogram (7/3/2023) recent echo showed LV EF 55-60%, normal RV function, mechanical TV with average gradient of 14 mmHg.   Echocardiogram (3/20/2023): LVEF 60-65% RV mildly dilated and RA moderately dilated. S/P mechanical TVR; leaflets poorly visualized. Elevated mean gradient 15 mmHg at HR 70 bpm worrisome for prosthesis malfunction. Compared to the piror study on 2/27/2023, no change in TV gradient.          Assessment and Plan:     ASSESSMENT:    Atrial myxoma with history of TV resection in 1997  Underwent tricuspid valve replacement in 2001 and repeat mechanical valve replacement in 2016 (grew out of valve)  S/p second redo tricuspid valve replacement with bioprosthetic valve 7/2023 by Dr. Nowak at [facility] with 29 mm [employer] Epic valve    Atrial flutter  Upon review of her previous ECGs it appears this is paroxysmal.  Dr. Koch kindly reviewed her previous ECGs as well noting that she was in atrial flutter and February 2023 during admission but most recent ECG from 10/6/2023 ventricular paced with underlying sinus rhythm.  Previously anticoagulated on Coumadin for mechanical tricuspid valve.  Coumadin was discontinued in 7/2023 at the time of bioprosthetic valve replacement  [id number] score of 1 (gender)    Complete heart block  Status post permanent pacemaker in 2016 with LV epicardial lead and pacemaker placed in mid upper abdomen  Device reached ALLISON in 9/2022 and underwent generator change in 2/2023    PLAN:     Recommend establishing care with general cardiology in 3-4  "months with repeat echo that will be 1 year out post TVR.  The patient met PORSCHE Forrester CNP (who has left our group) as well as Dr. Diego during her admission and 2/2023.   A atrial flutter ablation could be pursued in the future given patient's young age.  Although, the atrial flutter is of unknown chronicity.  Follow with routine device checks     Orders this Visit:  Orders Placed This Encounter   Procedures    Follow-Up with Cardiology    Follow-Up with Cardiology    Echocardiogram Complete     No orders of the defined types were placed in this encounter.    There are no discontinued medications.    Today's clinic visit entailed:  Review of prior external note(s) from [facility]  Review of the result(s) of each unique test - Echo, device checks   Ordering of each unique test  Prescription drug management  40 minutes spent by me on the date of the encounter doing chart review, history and exam, documentation and further activities per the note  Provider  Link to [facility] Help Grid     The level of medical decision making during this visit was of moderate complexity.           Review of Systems:     Review of Systems:  Skin:  Negative     Eyes:  Positive for glasses  ENT:  Negative    Respiratory:  Negative    Cardiovascular:  Negative;chest pain;palpitations;syncope or near-syncope;cyanosis;lightheadedness;dizziness;edema;fatigue    Gastroenterology: Negative    Genitourinary:  Negative    Musculoskeletal:  Negative    Neurologic:  Negative    Psychiatric:  Negative    Heme/Lymph/Imm:  Negative    Endocrine:  Negative              Physical Exam:     Vitals: /66   Pulse 71   Ht 1.753 m (5' 9\")   Wt 77.3 kg (170 lb 6.4 oz)   BMI 25.16 kg/m    Constitutional: Well nourished and in no apparent distress.  Eyes: Pupils equal, round. Sclerae anicteric.   HEENT: Normocephalic, atraumatic.   Neck: Supple. JVD  Respiratory: Breathing non-labored. Lungs clear to auscultation bilaterally. No crackles, " wheezes, rhonchi, or rales.  Cardiovascular: Regular rate and rhythm, normal S1 and S2. No murmur, rub, or gallop.  Skin: Warm, dry. No rashes, cyanosis, or xanthelasma.  Extremities: No edema  Neurologic: No gross motor deficits. Alert, awake, and oriented to person, place and time.  Psychiatric: Affect appropriate.        CURRENT MEDICATIONS:  Current Outpatient Medications   Medication Sig Dispense Refill    acetaminophen (TYLENOL) 500 MG tablet Take 2 tablets (1,000 mg) by mouth every 8 hours as needed for mild pain or pain 100 tablet 0    aspirin (ASA) 81 MG EC tablet Take 1 tablet (81 mg) by mouth daily 100 tablet 0    ferrous sulfate (FEROSUL) 325 (65 Fe) MG tablet Take 1 tablet (325 mg) by mouth daily (with breakfast) 30 tablet 0    methocarbamol (ROBAXIN) 750 MG tablet Take 1 tablet (750 mg) by mouth every 6 hours as needed for muscle spasms 120 tablet 0    oxyCODONE (ROXICODONE) 5 MG tablet Take 1 tablet (5 mg) by mouth every 8 hours as needed for moderate to severe pain 16 tablet 0    docusate sodium (COLACE) 100 MG capsule Take 1 capsule (100 mg) by mouth 2 times daily as needed for constipation (Patient not taking: Reported on 8/11/2023) 30 capsule 0    gabapentin (NEURONTIN) 100 MG capsule Take 1 capsule (100 mg) by mouth 3 times daily as needed for other (post-operative pain) (Patient not taking: Reported on 10/25/2023) 60 capsule 0    ibuprofen (ADVIL/MOTRIN) 600 MG tablet Take 1 tablet (600 mg) by mouth every 6 hours as needed for moderate pain (Patient not taking: Reported on 10/25/2023) 28 tablet 0    polyethylene glycol (MIRALAX) 17 GM/Dose powder Take 17 g by mouth daily (Patient not taking: Reported on 8/11/2023) 510 g 0       ALLERGIES  No Known Allergies      PAST MEDICAL HISTORY:  Past Medical History:   Diagnosis Date    Cardiac pacemaker in situ     Complete heart block (H)     History of atrial myxoma, congenital     Metal foreign body in chest 07/2023    Temporary cardiac pacing wire cut  and left in situ    Tricuspid regurgitation        PAST SURGICAL HISTORY:  Past Surgical History:   Procedure Laterality Date    ATRIAL MYXOMA EXCISION      in infancy    EP PACEMAKER GENERATOR REPLACEMENT- DUAL N/A 02/24/2023    Procedure: Pacemaker Generator Replacement Dual;  Surgeon: Jordon Regan MD;  Location: [facility] CARDIAC CATH LAB    REDO STERNOTOMY REPAIR VALVE TRICUSPID N/A 7/20/2023    Procedure: REPEAT MEDIAN STERNOTOMY, LYSIS OF ADHESIONS, CARDIOPULMONARY BYPASS, TRICUSPID VALVE REPLACEMENT, TRANSESOPHAGEAL ECHOCARDIOGRAM PER ANESTHESIA;  Surgeon: Bryce Nowak MD;  Location: [facility] OR    REDO STERNOTOMY REPLACE VALVE TRICUSPID N/A 7/20/2023    Procedure: possible Redo sternotomy replace valve tricuspid;  Surgeon: Bryce Nowak MD;  Location: [facility] OR    REPLACE VALVE TRICUSPID  2016       FAMILY HISTORY:  Family History   Problem Relation Age of Onset    Anesthesia Reaction No family hx of     Deep Vein Thrombosis (DVT) No family hx of        SOCIAL HISTORY:  Social History     Socioeconomic History    Marital status:      Spouse name: None    Number of children: None    Years of education: None    Highest education level: None   Tobacco Use    Smoking status: Never    Smokeless tobacco: Never   Vaping Use    Vaping Use: Never used   Substance and Sexual Activity    Alcohol use: Never    Drug use: Never

## 2023-10-25 NOTE — PATIENT INSTRUCTIONS
Today's Recommendations    Please follow up with Dr. Diego in January/February with echocardiogram.    Please send POPVOX message or call [phone] to the RN team with questions or concerns.     Scheduling and after hours number [phone]    PORSCHE Hoffman, CNP

## 2024-01-26 ENCOUNTER — HOSPITAL ENCOUNTER (OUTPATIENT)
Dept: CARDIOLOGY | Facility: CLINIC | Age: 29
Discharge: HOME | End: 2024-01-26
Attending: INTERNAL MEDICINE | Admitting: INTERNAL MEDICINE
Payer: COMMERCIAL

## 2024-01-26 DIAGNOSIS — I36.1 NONRHEUMATIC TRICUSPID VALVE REGURGITATION: ICD-10-CM

## 2024-01-26 DIAGNOSIS — I07.9 TRICUSPID VALVE DISEASE: ICD-10-CM

## 2024-01-26 LAB — LVEF ECHO: NORMAL

## 2024-01-26 PROCEDURE — 93306 TTE W/DOPPLER COMPLETE: CPT | Mod: 26 | Performed by: INTERNAL MEDICINE

## 2024-01-26 PROCEDURE — 93306 TTE W/DOPPLER COMPLETE: CPT

## 2024-04-17 ENCOUNTER — OFFICE VISIT (OUTPATIENT)
Dept: CARDIOLOGY | Facility: CLINIC | Age: 29
End: 2024-04-17
Attending: NURSE PRACTITIONER
Payer: COMMERCIAL

## 2024-04-17 VITALS
HEART RATE: 70 BPM | WEIGHT: 177.1 LBS | BODY MASS INDEX: 26.23 KG/M2 | DIASTOLIC BLOOD PRESSURE: 76 MMHG | OXYGEN SATURATION: 93 % | SYSTOLIC BLOOD PRESSURE: 114 MMHG | HEIGHT: 69 IN

## 2024-04-17 DIAGNOSIS — I07.9 TRICUSPID VALVE DISEASE: ICD-10-CM

## 2024-04-17 DIAGNOSIS — I36.1 NONRHEUMATIC TRICUSPID VALVE REGURGITATION: ICD-10-CM

## 2024-04-17 DIAGNOSIS — Z95.0 CARDIAC PACEMAKER IN SITU: ICD-10-CM

## 2024-04-17 PROCEDURE — 93000 ELECTROCARDIOGRAM COMPLETE: CPT | Performed by: INTERNAL MEDICINE

## 2024-04-17 PROCEDURE — 99214 OFFICE O/P EST MOD 30 MIN: CPT | Performed by: INTERNAL MEDICINE

## 2024-04-17 NOTE — PROGRESS NOTES
CARDIOLOGY CLINIC CONSULTATION    PRIMARY CARE PHYSICIAN:  Physician No Ref-Primary    Tests reviewed/interpreted independently in clinic today:   EKG, Echocardiogram, Blood work.     The level of medical decision making during this visit was of moderate complexity.     HISTORY OF PRESENT ILLNESS:  Today, I had the pleasure of connecting with Silvia Otero.        ASSESSMENT: Pertinent issues addressed/ reviewed during this cardiology visit             Regular follow up in 12 months or sooner if needed.    Simon BILLY, FAC, [facility]  Cardiology - [facility] Heart  April 17, 2024    No orders of the defined types were placed in this encounter.      PAST MEDICAL HISTORY:  Past Medical History:   Diagnosis Date    Cardiac pacemaker in situ     Complete heart block (H)     History of atrial myxoma, congenital     Metal foreign body in chest 07/2023    Temporary cardiac pacing wire cut and left in situ    Tricuspid regurgitation        MEDICATIONS:  Current Outpatient Medications   Medication Sig Dispense Refill    acetaminophen (TYLENOL) 500 MG tablet Take 2 tablets (1,000 mg) by mouth every 8 hours as needed for mild pain or pain 100 tablet 0    aspirin (ASA) 81 MG EC tablet Take 1 tablet (81 mg) by mouth daily 100 tablet 0    ferrous sulfate (FEROSUL) 325 (65 Fe) MG tablet Take 1 tablet (325 mg) by mouth daily (with breakfast) 30 tablet 0    oxyCODONE (ROXICODONE) 5 MG tablet Take 1 tablet (5 mg) by mouth every 8 hours as needed for moderate to severe pain 16 tablet 0     No current facility-administered medications for this visit.       ALLERGIES:  No Known Allergies    SOCIAL HISTORY:  I have reviewed this patient's social history and updated it with pertinent information if needed. Silvia Otero  reports that she has never smoked. She has never used smokeless tobacco. She reports that she does not drink alcohol and does not use drugs.    FAMILY HISTORY:  I have reviewed this patient's family history and updated it  with pertinent information if needed.   Family History   Problem Relation Age of Onset    Anesthesia Reaction No family hx of     Deep Vein Thrombosis (DVT) No family hx of        REVIEW OF SYSTEMS:  Skin:  not assessed     Eyes:  not assessed    ENT:  not assessed    Respiratory:  Positive for dyspnea on exertion  Cardiovascular:    Positive for;palpitations;chest pain  Gastroenterology: not assessed    Genitourinary:       Musculoskeletal:  not assessed    Neurologic:  Positive for headaches  Psychiatric:    anxiety  Heme/Lymph/Imm:  Negative    Endocrine:  Negative        PHYSICAL EXAM:      BP: 114/76 Pulse: 70     SpO2: 93 %      Vital Signs with Ranges  Pulse:  [70] 70  BP: (114)/(76) 114/76  SpO2:  [93 %] 93 %  177 lbs 1.6 oz    Constitutional: alert, no distress  Respiratory: Good bilateral air entry  Cardiovascular: s1 s2 normal, no murmurs  GI: nondistended  Neuropsychiatric: appropriate affact      This note was completed in part using dictation via the Dragon voice recognition software. Some word and grammatical errors may occur and must be interpreted in the appropriate clinical context.  If there are any questions pertaining to this issue, please contact me for further clarification.

## 2024-04-17 NOTE — PROGRESS NOTES
CARDIOLOGY CLINIC CONSULTATION    PRIMARY CARE PHYSICIAN:  Physician No Ref-Primary    Tests reviewed/interpreted independently in clinic today:   EKG, Echocardiogram, Blood work.     The level of medical decision making during this visit was of moderate complexity.     HISTORY OF PRESENT ILLNESS:  Today, I had the pleasure of connecting with Silvia Otero.  She is a very pleasant 28-year-old lady who presents to the clinic in for follow-up visit.  She has previously been seen by my colleagues in electrophysiology.  She has a history of atrial myxoma involving the tricuspid valve which she required tricuspid valve replacement early in life.  Since then she has had 2 more tricuspid valve replacements the last one being in 2023.  A 29 mm bioprosthetic valve was used at the time.  Prior to that the patient had been on warfarin because of mechanical prosthesis but she is currently only on aspirin.  After the initial surgery in 1997 she developed complete heart block and an epicardial pacemaker was placed.  The generator is in the epigastric region.  She then had generator replacement for end-of-life.  At some point she was noted to be in atrial flutter but it was decided to monitor her and consider ablation in case this recurs.  Device interrogations have not been very helpful in determining atrial rhythm because of atrial being in the far field and absence of atrial lead.  On EKG today she has complete AV dyssynchrony.  She is 100% V paced [right bundle].    As far as symptoms are concerned she occasionally feels sharp chest pain at the site of surgery but otherwise feels well.  She takes Tylenol for these chest pain episodes which leads to resolution.  Does not report any difficulty walking or exerting herself.  Last echocardiogram showed well-seated, normally functioning tricuspid valve with no hemodynamically significant paravalvular leak.  LV/RV function remains normal.    ASSESSMENT: Pertinent issues addressed/  reviewed during this cardiology visit    Status post TVR x 3-29 mm bioprosthetic valve implanted 2023-mean gradient 7 mmHg.  No significant PVL.    Complete heart block after initial TAVR in 1997 s/p implantation of single-chamber epicardial pacemaker with recent generator change-she is 100% V paced.  Which is a right bundle associated with pacing.  Given her young age, I wonder if adding a atrial lead would improve long-term prognosis.  I understand that given the subcutaneous nature of her device to be not very comfortable or continued procedure but I think we will seek opinion of multiple facility colleagues regarding this.    History of atrial flutter-appears in sinus rhythm today.  Not on anticoagulation due to low [id number] score    Regular follow up in 12 months or sooner if needed.    Simon BILLY, FACC, [facility]  Cardiology - [facility] Heart  April 17, 2024    Orders Placed This Encounter   Procedures    EKG 12-lead complete w/read - Clinics (performed today)       PAST MEDICAL HISTORY:  Past Medical History:   Diagnosis Date    Cardiac pacemaker in situ     Complete heart block (H)     History of atrial myxoma, congenital     Metal foreign body in chest 07/2023    Temporary cardiac pacing wire cut and left in situ    Tricuspid regurgitation        MEDICATIONS:  Current Outpatient Medications   Medication Sig Dispense Refill    acetaminophen (TYLENOL) 500 MG tablet Take 2 tablets (1,000 mg) by mouth every 8 hours as needed for mild pain or pain 100 tablet 0    aspirin (ASA) 81 MG EC tablet Take 1 tablet (81 mg) by mouth daily 100 tablet 0    ferrous sulfate (FEROSUL) 325 (65 Fe) MG tablet Take 1 tablet (325 mg) by mouth daily (with breakfast) 30 tablet 0    oxyCODONE (ROXICODONE) 5 MG tablet Take 1 tablet (5 mg) by mouth every 8 hours as needed for moderate to severe pain 16 tablet 0     No current facility-administered medications for this visit.       ALLERGIES:  No Known Allergies    SOCIAL HISTORY:  I  have reviewed this patient's social history and updated it with pertinent information if needed. Silvia Otero  reports that she has never smoked. She has never used smokeless tobacco. She reports that she does not drink alcohol and does not use drugs.    FAMILY HISTORY:  I have reviewed this patient's family history and updated it with pertinent information if needed.   Family History   Problem Relation Age of Onset    Anesthesia Reaction No family hx of     Deep Vein Thrombosis (DVT) No family hx of        REVIEW OF SYSTEMS:  Skin:  not assessed     Eyes:  not assessed    ENT:  not assessed    Respiratory:  Positive for dyspnea on exertion  Cardiovascular:    Positive for;palpitations;chest pain  Gastroenterology: not assessed    Genitourinary:       Musculoskeletal:  not assessed    Neurologic:  Positive for headaches  Psychiatric:    anxiety  Heme/Lymph/Imm:  Negative    Endocrine:  Negative        PHYSICAL EXAM:      BP: 114/76 Pulse: 70     SpO2: 93 %      Vital Signs with Ranges  Pulse:  [70] 70  BP: (114)/(76) 114/76  SpO2:  [93 %] 93 %  177 lbs 1.6 oz    Constitutional: alert, no distress  Respiratory: Good bilateral air entry  Cardiovascular: s1 s2 normal, no murmurs  GI: nondistended  Neuropsychiatric: appropriate affact      This note was completed in part using dictation via the Dragon voice recognition software. Some word and grammatical errors may occur and must be interpreted in the appropriate clinical context.  If there are any questions pertaining to this issue, please contact me for further clarification.

## 2024-04-17 NOTE — LETTER
4/17/2024    Physician No Ref-Primary  No address on file    RE: Silvia Hustonla       Dear Colleague,     I had the pleasure of seeing Silvia Otero in the [facility].  CARDIOLOGY CLINIC CONSULTATION    PRIMARY CARE PHYSICIAN:  Physician No Ref-Primary    Tests reviewed/interpreted independently in clinic today:   EKG, Echocardiogram, Blood work.     The level of medical decision making during this visit was of moderate complexity.     HISTORY OF PRESENT ILLNESS:  Today, I had the pleasure of connecting with Silvia Otero.  She is a very pleasant 28-year-old lady who presents to the clinic in for follow-up visit.  She has previously been seen by my colleagues in electrophysiology.  She has a history of atrial myxoma involving the tricuspid valve which she required tricuspid valve replacement early in life.  Since then she has had 2 more tricuspid valve replacements the last one being in 2023.  A 29 mm bioprosthetic valve was used at the time.  Prior to that the patient had been on warfarin because of mechanical prosthesis but she is currently only on aspirin.  After the initial surgery in 1997 she developed complete heart block and an epicardial pacemaker was placed.  The generator is in the epigastric region.  She then had generator replacement for end-of-life.  At some point she was noted to be in atrial flutter but it was decided to monitor her and consider ablation in case this recurs.  Device interrogations have not been very helpful in determining atrial rhythm because of atrial being in the far field and absence of atrial lead.  On EKG today she has complete AV dyssynchrony.  She is 100% V paced [right bundle].    As far as symptoms are concerned she occasionally feels sharp chest pain at the site of surgery but otherwise feels well.  She takes Tylenol for these chest pain episodes which leads to resolution.  Does not report any difficulty walking or exerting herself.  Last  echocardiogram showed well-seated, normally functioning tricuspid valve with no hemodynamically significant paravalvular leak.  LV/RV function remains normal.    ASSESSMENT: Pertinent issues addressed/ reviewed during this cardiology visit    Status post TVR x 3-29 mm bioprosthetic valve implanted 2023-mean gradient 7 mmHg.  No significant PVL.    Complete heart block after initial TAVR in 1997 s/p implantation of single-chamber epicardial pacemaker with recent generator change-she is 100% V paced.  Which is a right bundle associated with pacing.  Given her young age, I wonder if adding a atrial lead would improve long-term prognosis.  I understand that given the subcutaneous nature of her device to be not very comfortable or continued procedure but I think we will seek opinion of multiple facility colleagues regarding this.    History of atrial flutter-appears in sinus rhythm today.  Not on anticoagulation due to low [id number] score    Regular follow up in 12 months or sooner if needed.    Simon BILLY, FACC, FASE  Cardiology - [facility]  April 17, 2024    Orders Placed This Encounter   Procedures    EKG 12-lead complete w/read - Clinics (performed today)       PAST MEDICAL HISTORY:  Past Medical History:   Diagnosis Date    Cardiac pacemaker in situ     Complete heart block (H)     History of atrial myxoma, congenital     Metal foreign body in chest 07/2023    Temporary cardiac pacing wire cut and left in situ    Tricuspid regurgitation        MEDICATIONS:  Current Outpatient Medications   Medication Sig Dispense Refill    acetaminophen (TYLENOL) 500 MG tablet Take 2 tablets (1,000 mg) by mouth every 8 hours as needed for mild pain or pain 100 tablet 0    aspirin (ASA) 81 MG EC tablet Take 1 tablet (81 mg) by mouth daily 100 tablet 0    ferrous sulfate (FEROSUL) 325 (65 Fe) MG tablet Take 1 tablet (325 mg) by mouth daily (with breakfast) 30 tablet 0    oxyCODONE (ROXICODONE) 5 MG tablet Take 1 tablet (5  mg) by mouth every 8 hours as needed for moderate to severe pain 16 tablet 0     No current facility-administered medications for this visit.       ALLERGIES:  No Known Allergies    SOCIAL HISTORY:  I have reviewed this patient's social history and updated it with pertinent information if needed. Silvia Otero  reports that she has never smoked. She has never used smokeless tobacco. She reports that she does not drink alcohol and does not use drugs.    FAMILY HISTORY:  I have reviewed this patient's family history and updated it with pertinent information if needed.   Family History   Problem Relation Age of Onset    Anesthesia Reaction No family hx of     Deep Vein Thrombosis (DVT) No family hx of        REVIEW OF SYSTEMS:  Skin:  not assessed     Eyes:  not assessed    ENT:  not assessed    Respiratory:  Positive for dyspnea on exertion  Cardiovascular:    Positive for;palpitations;chest pain  Gastroenterology: not assessed    Genitourinary:       Musculoskeletal:  not assessed    Neurologic:  Positive for headaches  Psychiatric:    anxiety  Heme/Lymph/Imm:  Negative    Endocrine:  Negative        PHYSICAL EXAM:      BP: 114/76 Pulse: 70     SpO2: 93 %      Vital Signs with Ranges  Pulse:  [70] 70  BP: (114)/(76) 114/76  SpO2:  [93 %] 93 %  177 lbs 1.6 oz    Constitutional: alert, no distress  Respiratory: Good bilateral air entry  Cardiovascular: s1 s2 normal, no murmurs  GI: nondistended  Neuropsychiatric: appropriate affact      This note was completed in part using dictation via the Dragon voice recognition software. Some word and grammatical errors may occur and must be interpreted in the appropriate clinical context.  If there are any questions pertaining to this issue, please contact me for further clarification.    CARDIOLOGY CLINIC CONSULTATION    PRIMARY CARE PHYSICIAN:  Physician No Ref-Primary    Tests reviewed/interpreted independently in clinic today:   EKG, Echocardiogram, Blood work.     The  level of medical decision making during this visit was of moderate complexity.     HISTORY OF PRESENT ILLNESS:  Today, I had the pleasure of connecting with Silvia Otero.        ASSESSMENT: Pertinent issues addressed/ reviewed during this cardiology visit             Regular follow up in 12 months or sooner if needed.    Simon BILLY, [facility], [facility]  Cardiology - [facility]  April 17, 2024    No orders of the defined types were placed in this encounter.      PAST MEDICAL HISTORY:  Past Medical History:   Diagnosis Date    Cardiac pacemaker in situ     Complete heart block (H)     History of atrial myxoma, congenital     Metal foreign body in chest 07/2023    Temporary cardiac pacing wire cut and left in situ    Tricuspid regurgitation        MEDICATIONS:  Current Outpatient Medications   Medication Sig Dispense Refill    acetaminophen (TYLENOL) 500 MG tablet Take 2 tablets (1,000 mg) by mouth every 8 hours as needed for mild pain or pain 100 tablet 0    aspirin (ASA) 81 MG EC tablet Take 1 tablet (81 mg) by mouth daily 100 tablet 0    ferrous sulfate (FEROSUL) 325 (65 Fe) MG tablet Take 1 tablet (325 mg) by mouth daily (with breakfast) 30 tablet 0    oxyCODONE (ROXICODONE) 5 MG tablet Take 1 tablet (5 mg) by mouth every 8 hours as needed for moderate to severe pain 16 tablet 0     No current facility-administered medications for this visit.       ALLERGIES:  No Known Allergies    SOCIAL HISTORY:  I have reviewed this patient's social history and updated it with pertinent information if needed. Silvia Otero  reports that she has never smoked. She has never used smokeless tobacco. She reports that she does not drink alcohol and does not use drugs.    FAMILY HISTORY:  I have reviewed this patient's family history and updated it with pertinent information if needed.   Family History   Problem Relation Age of Onset    Anesthesia Reaction No family hx of     Deep Vein Thrombosis (DVT) No family hx of        REVIEW  OF SYSTEMS:  Skin:  not assessed     Eyes:  not assessed    ENT:  not assessed    Respiratory:  Positive for dyspnea on exertion  Cardiovascular:    Positive for;palpitations;chest pain  Gastroenterology: not assessed    Genitourinary:       Musculoskeletal:  not assessed    Neurologic:  Positive for headaches  Psychiatric:    anxiety  Heme/Lymph/Imm:  Negative    Endocrine:  Negative        PHYSICAL EXAM:      BP: 114/76 Pulse: 70     SpO2: 93 %      Vital Signs with Ranges  Pulse:  [70] 70  BP: (114)/(76) 114/76  SpO2:  [93 %] 93 %  177 lbs 1.6 oz    Constitutional: alert, no distress  Respiratory: Good bilateral air entry  Cardiovascular: s1 s2 normal, no murmurs  GI: nondistended  Neuropsychiatric: appropriate affact      This note was completed in part using dictation via the Dragon voice recognition software. Some word and grammatical errors may occur and must be interpreted in the appropriate clinical context.  If there are any questions pertaining to this issue, please contact me for further clarification.      Thank you for allowing me to participate in the care of your patient.      Sincerely,     Simon Diego MD     [facility]  cc:   PORSCHE Hoffman CNP  [address]  [place] [address]

## 2024-07-03 ENCOUNTER — DOCUMENTATION ONLY (OUTPATIENT)
Dept: CARDIOLOGY | Facility: CLINIC | Age: 29
End: 2024-07-03
Payer: COMMERCIAL

## 2024-07-03 NOTE — PROGRESS NOTES
Multiple attempts made to contact patient to schedule a device check. After failed attempts, patient has been inactivated from our device clinic. Patient can call the device clinic at any time to re-establish care.      SIVA Hay  Device Clinic

## 2024-09-22 ENCOUNTER — APPOINTMENT (OUTPATIENT)
Dept: CT IMAGING | Facility: CLINIC | Age: 29
End: 2024-09-22
Attending: EMERGENCY MEDICINE

## 2024-09-22 ENCOUNTER — HOSPITAL ENCOUNTER (EMERGENCY)
Facility: CLINIC | Age: 29
Discharge: HOME | End: 2024-09-23
Attending: EMERGENCY MEDICINE | Admitting: EMERGENCY MEDICINE

## 2024-09-22 ENCOUNTER — APPOINTMENT (OUTPATIENT)
Dept: ULTRASOUND IMAGING | Facility: CLINIC | Age: 29
End: 2024-09-22
Attending: EMERGENCY MEDICINE

## 2024-09-22 DIAGNOSIS — N93.9 VAGINAL BLEEDING: ICD-10-CM

## 2024-09-22 DIAGNOSIS — R10.9 ABDOMINAL PAIN, UNSPECIFIED ABDOMINAL LOCATION: ICD-10-CM

## 2024-09-22 LAB
ALBUMIN SERPL BCG-MCNC: 4.9 G/DL (ref 3.5–5.2)
ALP SERPL-CCNC: 95 U/L (ref 40–150)
ALT SERPL W P-5'-P-CCNC: 16 U/L (ref 0–50)
ANION GAP SERPL CALCULATED.3IONS-SCNC: 12 MMOL/L (ref 7–15)
AST SERPL W P-5'-P-CCNC: 22 U/L (ref 0–45)
BASOPHILS # BLD AUTO: 0 10E3/UL (ref 0–0.2)
BASOPHILS NFR BLD AUTO: 1 %
BILIRUB DIRECT SERPL-MCNC: <0.2 MG/DL (ref 0–0.3)
BILIRUB SERPL-MCNC: 0.6 MG/DL
BUN SERPL-MCNC: 15.6 MG/DL (ref 6–20)
CALCIUM SERPL-MCNC: 9.8 MG/DL (ref 8.8–10.4)
CHLORIDE SERPL-SCNC: 104 MMOL/L (ref 98–107)
CREAT SERPL-MCNC: 0.59 MG/DL (ref 0.51–0.95)
EGFRCR SERPLBLD CKD-EPI 2021: >90 ML/MIN/1.73M2
EOSINOPHIL # BLD AUTO: 0.1 10E3/UL (ref 0–0.7)
EOSINOPHIL NFR BLD AUTO: 2 %
ERYTHROCYTE [DISTWIDTH] IN BLOOD BY AUTOMATED COUNT: 18.8 % (ref 10–15)
GLUCOSE SERPL-MCNC: 110 MG/DL (ref 70–99)
HCG SERPL QL: NEGATIVE
HCO3 SERPL-SCNC: 26 MMOL/L (ref 22–29)
HCT VFR BLD AUTO: 30.7 % (ref 35–47)
HGB BLD-MCNC: 9 G/DL (ref 11.7–15.7)
IMM GRANULOCYTES # BLD: 0 10E3/UL
IMM GRANULOCYTES NFR BLD: 0 %
LYMPHOCYTES # BLD AUTO: 1.1 10E3/UL (ref 0.8–5.3)
LYMPHOCYTES NFR BLD AUTO: 33 %
MCH RBC QN AUTO: 20.4 PG (ref 26.5–33)
MCHC RBC AUTO-ENTMCNC: 29.3 G/DL (ref 31.5–36.5)
MCV RBC AUTO: 70 FL (ref 78–100)
MONOCYTES # BLD AUTO: 0.2 10E3/UL (ref 0–1.3)
MONOCYTES NFR BLD AUTO: 6 %
NEUTROPHILS # BLD AUTO: 1.9 10E3/UL (ref 1.6–8.3)
NEUTROPHILS NFR BLD AUTO: 59 %
NRBC # BLD AUTO: 0 10E3/UL
NRBC BLD AUTO-RTO: 0 /100
PLATELET # BLD AUTO: 187 10E3/UL (ref 150–450)
POTASSIUM SERPL-SCNC: 3.7 MMOL/L (ref 3.4–5.3)
PROT SERPL-MCNC: 7.9 G/DL (ref 6.4–8.3)
RBC # BLD AUTO: 4.41 10E6/UL (ref 3.8–5.2)
SODIUM SERPL-SCNC: 142 MMOL/L (ref 135–145)
WBC # BLD AUTO: 3.3 10E3/UL (ref 4–11)

## 2024-09-22 PROCEDURE — 76830 TRANSVAGINAL US NON-OB: CPT

## 2024-09-22 PROCEDURE — 84703 CHORIONIC GONADOTROPIN ASSAY: CPT | Performed by: EMERGENCY MEDICINE

## 2024-09-22 PROCEDURE — 36415 COLL VENOUS BLD VENIPUNCTURE: CPT | Performed by: EMERGENCY MEDICINE

## 2024-09-22 PROCEDURE — 76856 US EXAM PELVIC COMPLETE: CPT

## 2024-09-22 PROCEDURE — 85025 COMPLETE CBC W/AUTO DIFF WBC: CPT | Performed by: EMERGENCY MEDICINE

## 2024-09-22 PROCEDURE — 82374 ASSAY BLOOD CARBON DIOXIDE: CPT | Performed by: EMERGENCY MEDICINE

## 2024-09-22 PROCEDURE — 250N000009 HC RX 250: Performed by: EMERGENCY MEDICINE

## 2024-09-22 PROCEDURE — 250N000011 HC RX IP 250 OP 636: Performed by: EMERGENCY MEDICINE

## 2024-09-22 PROCEDURE — 74177 CT ABD & PELVIS W/CONTRAST: CPT

## 2024-09-22 PROCEDURE — 99285 EMERGENCY DEPT VISIT HI MDM: CPT | Mod: 25

## 2024-09-22 PROCEDURE — 258N000003 HC RX IP 258 OP 636: Performed by: EMERGENCY MEDICINE

## 2024-09-22 PROCEDURE — 82248 BILIRUBIN DIRECT: CPT | Performed by: EMERGENCY MEDICINE

## 2024-09-22 PROCEDURE — 96360 HYDRATION IV INFUSION INIT: CPT | Mod: 59

## 2024-09-22 PROCEDURE — 96361 HYDRATE IV INFUSION ADD-ON: CPT

## 2024-09-22 RX ORDER — KETOROLAC TROMETHAMINE 15 MG/ML
15 INJECTION, SOLUTION INTRAMUSCULAR; INTRAVENOUS ONCE
Status: COMPLETED | OUTPATIENT
Start: 2024-09-22 | End: 2024-09-22

## 2024-09-22 RX ORDER — IOPAMIDOL 755 MG/ML
89 INJECTION, SOLUTION INTRAVASCULAR ONCE
Status: COMPLETED | OUTPATIENT
Start: 2024-09-22 | End: 2024-09-22

## 2024-09-22 RX ADMIN — SODIUM CHLORIDE 63 ML: 9 INJECTION, SOLUTION INTRAVENOUS at 23:30

## 2024-09-22 RX ADMIN — SODIUM CHLORIDE 1000 ML: 9 INJECTION, SOLUTION INTRAVENOUS at 21:07

## 2024-09-22 RX ADMIN — IOPAMIDOL 89 ML: 755 INJECTION, SOLUTION INTRAVENOUS at 23:30

## 2024-09-22 ASSESSMENT — ACTIVITIES OF DAILY LIVING (ADL)
ADLS_ACUITY_SCORE: 37
ADLS_ACUITY_SCORE: 35
ADLS_ACUITY_SCORE: 37

## 2024-09-22 ASSESSMENT — COLUMBIA-SUICIDE SEVERITY RATING SCALE - C-SSRS
6. HAVE YOU EVER DONE ANYTHING, STARTED TO DO ANYTHING, OR PREPARED TO DO ANYTHING TO END YOUR LIFE?: NO
2. HAVE YOU ACTUALLY HAD ANY THOUGHTS OF KILLING YOURSELF IN THE PAST MONTH?: NO
1. IN THE PAST MONTH, HAVE YOU WISHED YOU WERE DEAD OR WISHED YOU COULD GO TO SLEEP AND NOT WAKE UP?: NO

## 2024-09-23 VITALS
RESPIRATION RATE: 18 BRPM | HEART RATE: 70 BPM | DIASTOLIC BLOOD PRESSURE: 85 MMHG | OXYGEN SATURATION: 98 % | SYSTOLIC BLOOD PRESSURE: 129 MMHG | TEMPERATURE: 98 F

## 2024-09-23 NOTE — ED TRIAGE NOTES
"Pt arrives via triage with c/o irregular vaginal bleeding, reporting that she got her menstrual cycle on time at the end of August and has since had 2 more episodes of bleeding. Last week was one episode and today began another episode. Pt reports the blood is red, \"but lighter than period blood\". Pt reports the bleeding is accompanied by severe headaches, abdominal pain, and 1 episode of urinary incontinence today. Pt reports she \"messed myself and didn't even know\".         "

## 2024-09-23 NOTE — ED PROVIDER NOTES
Emergency Department Note      History of Present Illness     Chief Complaint   Abdominal Pain and Vaginal Bleeding      HPI   Silvia Otero is a 28 year old female who presents for evaluation of abdominal pain and vaginal bleeding. Patient reports that she typically has a regular menstrual cycle and her last regular period was 2-3 days of light bleeding at the end of August, though she states that last week she experienced an episode of new bleeding which then stopped and today her bleeding began again but much lighter in color. She states that she was out of her home today and felt as though she had to urinate and when she got home she noticed she had bled through her pad, underwear, and pants.     Patient states that she has right-sided pelvic pain, abdominal pain.  States that it is not similar to her cramp.  She reports no changes in bowel movements.  No pain or difficulty with urination.  Denies any nausea or vomiting.  She reports no concern for pregnancy or any STDs. She does not see an OBGYN regularly. She denies a history of abdominal surgeries or anemia diagnosis. No vomiting, abnormal bowel movements, loss of appetite, cramps, or typical period pains.       Independent Historian   None    Review of External Notes   Reviewed cardiology note from April 2024    Past Medical History     Medical History and Problem List   Cardiac pacemaker in situ  Complete heart block  Atrial myxoma, congenital  Metal foreign body in chest  Tricuspid regurgitation      Medications   Warfarin   Roxicodone  Ferosul  Aspirin 81mg      Surgical History   Replace valve tricuspid   Redo sternotomy replace valve tricuspid  Pacemaker generator replacement-dual  Atrial myxoma excision      Physical Exam     Patient Vitals for the past 24 hrs:   BP Temp Temp src Pulse Resp SpO2   09/22/24 2356 129/85 -- -- 70 18 98 %   09/22/24 1957 110/67 98  F (36.7  C) Oral 62 16 98 %     Physical Exam  Vitals reviewed.   Constitutional:        General: She is not in acute distress.     Appearance: She is not ill-appearing.   HENT:      Head: Normocephalic and atraumatic.   Eyes:      Extraocular Movements: Extraocular movements intact.   Cardiovascular:      Rate and Rhythm: Normal rate and regular rhythm.   Pulmonary:      Effort: Pulmonary effort is normal. No respiratory distress.      Breath sounds: Normal breath sounds. No wheezing.   Abdominal:      Palpations: Abdomen is soft.      Tenderness: There is no abdominal tenderness. There is no guarding or rebound.   Musculoskeletal:      Cervical back: Normal range of motion.   Skin:     General: Skin is warm and dry.   Neurological:      Mental Status: She is alert and oriented to person, place, and time.      GCS: GCS eye subscore is 4. GCS verbal subscore is 5. GCS motor subscore is 6.   Psychiatric:         Behavior: Behavior normal.           Diagnostics     Lab Results   Labs Ordered and Resulted from Time of ED Arrival to Time of ED Departure   BASIC METABOLIC PANEL - Abnormal       Result Value    Sodium 142      Potassium 3.7      Chloride 104      Carbon Dioxide (CO2) 26      Anion Gap 12      Urea Nitrogen 15.6      Creatinine 0.59      GFR Estimate >90      Calcium 9.8      Glucose 110 (*)    CBC WITH PLATELETS AND DIFFERENTIAL - Abnormal    WBC Count 3.3 (*)     RBC Count 4.41      Hemoglobin 9.0 (*)     Hematocrit 30.7 (*)     MCV 70 (*)     MCH 20.4 (*)     MCHC 29.3 (*)     RDW 18.8 (*)     Platelet Count 187      % Neutrophils 59      % Lymphocytes 33      % Monocytes 6      % Eosinophils 2      % Basophils 1      % Immature Granulocytes 0      NRBCs per 100 WBC 0      Absolute Neutrophils 1.9      Absolute Lymphocytes 1.1      Absolute Monocytes 0.2      Absolute Eosinophils 0.1      Absolute Basophils 0.0      Absolute Immature Granulocytes 0.0      Absolute NRBCs 0.0     HCG QUALITATIVE PREGNANCY - Normal    hCG Serum Qualitative Negative     HEPATIC FUNCTION PANEL - Normal     Protein Total 7.9      Albumin 4.9      Bilirubin Total 0.6      Alkaline Phosphatase 95      AST 22      ALT 16      Bilirubin Direct <0.20     ROUTINE UA WITH MICROSCOPIC REFLEX TO CULTURE       Imaging   CT Abdomen Pelvis w Contrast   Final Result   IMPRESSION:    1.  No acute findings or inflammatory changes in the abdomen or pelvis. No acute appendicitis.      2.  Nutmeg appearance of the liver, likely due to hepatic venous congestion and/or congestive heart failure.      US Pelvis Cmplt w Transvag & Doppler LmtPel Duplex Limited   Final Result   IMPRESSION:     1.  Normal pelvic ultrasound.   2.  No evidence of ovarian torsion.                       ED Course      Medications Administered   Medications   sodium chloride 0.9% BOLUS 1,000 mL (0 mLs Intravenous Stopped 9/22/24 2359)   ketorolac (TORADOL) injection 15 mg (15 mg Intravenous Not Given 9/22/24 2359)   iopamidol (ISOVUE-370) solution 89 mL (89 mLs Intravenous $Given 9/22/24 2330)   sodium chloride 0.9 % CT scan flush use (63 mLs Intravenous $Given 9/22/24 2330)       Procedures   Procedures     Discussion of Management   None    ED Course   ED Course as of 09/23/24 0101   Sun Sep 22, 2024   2022 I obtained patient history and performed a physical exam.    2301 Assessed patient she states pain has improved but continues have 6 out of 10 pain.  Updated on ultrasound and lab results.  Discussed plan for CT scan to rule out appendicitis, other intra-abdominal process.       Additional Documentation  None    Medical Decision Making / Diagnosis     CMS Diagnoses: None    MIPS       None    MDM   Silvia Otero is a 28 year old female who presents today with irregular vaginal bleeding and abdominal pain.  Patient states that today she developed irregular vaginal bleeding as well as right sided pelvic, abdominal pain.  She reports no fevers.  She is hemodynamically stable, afebrile on arrival.  Abdomen soft nontender throughout.  Discussed plan for basic  blood work and ultrasound of the pelvis.  Her hemoglobin is stable at 9.9.  Updated patient on lab results.  Ultrasound showed a normal pelvic ultrasound.  She was updated on results.  She states that the pain had improved but still persisted.  Discussed plan for CT to rule out appendicitis.  She agreed with plan of care.  CT showed no appendicitis.  She was updated on results.  Discussed plan for referral to gynecology to evaluate irregular vaginal bleeding.  Discussed with her care instructions and return precautions.  She is comfortable with plan of care at this time.      Disposition   The patient was discharged.     Diagnosis     ICD-10-CM    1. Abdominal pain, unspecified abdominal location  R10.9       2. Vaginal bleeding  N93.9 Ob/Gyn  Referral           Discharge Medications   Discharge Medication List as of 9/23/2024 12:02 AM            Scribe Disclosure:  Cathy RIGGS, am serving as a scribe at 8:40 PM on 9/22/2024 to document services personally performed by Lillian Anguiano DO based on my observations and the provider's statements to me.        Lillian Anguiano DO  09/23/24 0108

## 2024-09-23 NOTE — DISCHARGE INSTRUCTIONS
Referral was placed to OB/GYN please make an appointment for follow-up regarding your irregular vaginal bleeding    Follow-up with your primary care doctor in 1 to 2 days    Return to the ER for any worsening or concerning symptoms

## 2024-10-06 ENCOUNTER — HEALTH MAINTENANCE LETTER (OUTPATIENT)
Age: 29
End: 2024-10-06

## 2025-02-28 ENCOUNTER — OFFICE VISIT (OUTPATIENT)
Dept: FAMILY MEDICINE | Facility: CLINIC | Age: 30
End: 2025-02-28

## 2025-02-28 VITALS
WEIGHT: 173 LBS | BODY MASS INDEX: 25.62 KG/M2 | DIASTOLIC BLOOD PRESSURE: 70 MMHG | HEART RATE: 70 BPM | TEMPERATURE: 97.7 F | HEIGHT: 69 IN | RESPIRATION RATE: 20 BRPM | OXYGEN SATURATION: 98 % | SYSTOLIC BLOOD PRESSURE: 109 MMHG

## 2025-02-28 DIAGNOSIS — N92.6 IRREGULAR MENSTRUAL BLEEDING: Primary | ICD-10-CM

## 2025-02-28 DIAGNOSIS — D50.0 IRON DEFICIENCY ANEMIA DUE TO CHRONIC BLOOD LOSS: ICD-10-CM

## 2025-02-28 LAB
ERYTHROCYTE [DISTWIDTH] IN BLOOD BY AUTOMATED COUNT: 17.4 % (ref 10–15)
FERRITIN SERPL-MCNC: 9 NG/ML (ref 6–175)
HCT VFR BLD AUTO: 31.3 % (ref 35–47)
HGB BLD-MCNC: 8.5 G/DL (ref 11.7–15.7)
IRON BINDING CAPACITY (ROCHE): 440 UG/DL (ref 240–430)
IRON SATN MFR SERPL: 3 % (ref 15–46)
IRON SERPL-MCNC: 15 UG/DL (ref 37–145)
MCH RBC QN AUTO: 19.4 PG (ref 26.5–33)
MCHC RBC AUTO-ENTMCNC: 27.2 G/DL (ref 31.5–36.5)
MCV RBC AUTO: 71 FL (ref 78–100)
PLATELET # BLD AUTO: 237 10E3/UL (ref 150–450)
RBC # BLD AUTO: 4.39 10E6/UL (ref 3.8–5.2)
TSH SERPL DL<=0.005 MIU/L-ACNC: 1.25 UIU/ML (ref 0.3–4.2)
WBC # BLD AUTO: 3.5 10E3/UL (ref 4–11)

## 2025-02-28 PROCEDURE — 83550 IRON BINDING TEST: CPT

## 2025-02-28 PROCEDURE — 82728 ASSAY OF FERRITIN: CPT

## 2025-02-28 PROCEDURE — G2211 COMPLEX E/M VISIT ADD ON: HCPCS

## 2025-02-28 PROCEDURE — 85027 COMPLETE CBC AUTOMATED: CPT

## 2025-02-28 PROCEDURE — 84443 ASSAY THYROID STIM HORMONE: CPT

## 2025-02-28 PROCEDURE — 36415 COLL VENOUS BLD VENIPUNCTURE: CPT

## 2025-02-28 PROCEDURE — 99213 OFFICE O/P EST LOW 20 MIN: CPT

## 2025-02-28 PROCEDURE — 83540 ASSAY OF IRON: CPT

## 2025-02-28 RX ORDER — FERROUS SULFATE 325(65) MG
325 TABLET ORAL
Qty: 90 TABLET | Refills: 3 | Status: SHIPPED | OUTPATIENT
Start: 2025-02-28

## 2025-02-28 ASSESSMENT — PAIN SCALES - GENERAL: PAINLEVEL_OUTOF10: NO PAIN (0)

## 2025-02-28 ASSESSMENT — PATIENT HEALTH QUESTIONNAIRE - PHQ9
SUM OF ALL RESPONSES TO PHQ QUESTIONS 1-9: 6
SUM OF ALL RESPONSES TO PHQ QUESTIONS 1-9: 6
10. IF YOU CHECKED OFF ANY PROBLEMS, HOW DIFFICULT HAVE THESE PROBLEMS MADE IT FOR YOU TO DO YOUR WORK, TAKE CARE OF THINGS AT HOME, OR GET ALONG WITH OTHER PEOPLE: SOMEWHAT DIFFICULT

## 2025-02-28 NOTE — PROGRESS NOTES
"  Assessment & Plan     Irregular menstrual bleeding  Iron deficiency anemia due to chronic blood loss  Symptoms per HPI. Reviewed US and abdominal CT from 9/2024, grossly unrevealing but hx of right ovary cyst which may explain the occasional pelvic discomfort.  No red flag symptoms today or recently and without significant change from ED visit. Hx of anemia, will check iron studies. Recommend iron supplementation for chronic low volume blood loss likely from irregular menses. Will repeat pelvic US with plan to follow-up with OBGYN for ongoing management and recommendations of symptoms.  - Ob/Gyn  Referral; Future  - CBC with platelets  - Ferritin  - Iron & Iron Binding Capacity  - TSH with free T4 reflex  - ferrous sulfate (FEROSUL) 325 (65 Fe) MG tablet; Take 1 tablet (325 mg) by mouth daily (with breakfast).  - US Pelvic Complete with Transvaginal; Future            BMI  Estimated body mass index is 25.55 kg/m  as calculated from the following:    Height as of this encounter: 1.753 m (5' 9\").    Weight as of this encounter: 78.5 kg (173 lb).         MEDICATIONS:        - Start taking Iron supplementation       - Continue other medications without change  FURTHER TESTING:       - Pelvic ultrasound  CONSULTATION/REFERRAL to OBGYN  FUTURE APPOINTMENTS:       - Make appointment with OBGYN specialist       - Follow-up for annual visit or as needed    Subjective   curry is a 29 year old, presenting for the following health issues:  Rash (Patient is here with rash possible ringworm on her breast.)        2/28/2025     9:12 AM   Additional Questions   Roomed by Valente NGUYEN MA   Accompanied by None     Here for menstrual cycle irregularity, has been going on since last year.  Did have similar symptoms in September when she was seen in the ER, had imaging done  Historically has been regular and lasts 5-6 days but this has changed within the last year  No cramps but occasionally has pelvic pain, not " "current  Continues to have heavy periods during her \"first\" period. After two weeks, she then notices more bleeding - will come and go, sometimes spotting.  Some dizzy/lightheadedness, fatigue    History of Present Illness      She is taking medications regularly.                Review of Systems  Constitutional, HEENT, cardiovascular, pulmonary, gi and gu systems are negative, except as otherwise noted.      Objective    /70 (BP Location: Left arm, Patient Position: Sitting, Cuff Size: Adult Large)   Pulse 70   Temp 97.7  F (36.5  C) (Temporal)   Resp 20   Ht 1.753 m (5' 9\")   Wt 78.5 kg (173 lb)   LMP 02/14/2025 (Approximate)   SpO2 98%   BMI 25.55 kg/m    Body mass index is 25.55 kg/m .  Physical Exam  Vitals reviewed.   Constitutional:       Appearance: Normal appearance.   HENT:      Head: Normocephalic.   Eyes:      Pupils: Pupils are equal, round, and reactive to light.   Cardiovascular:      Rate and Rhythm: Normal rate.   Pulmonary:      Effort: Pulmonary effort is normal. No respiratory distress.   Abdominal:      Tenderness: There is no abdominal tenderness.   Musculoskeletal:         General: Normal range of motion.   Skin:     General: Skin is warm.   Neurological:      Mental Status: She is alert and oriented to person, place, and time.   Psychiatric:         Mood and Affect: Mood normal.         Behavior: Behavior normal.         Thought Content: Thought content normal.         Judgment: Judgment normal.              EXAM: US PELVIS COMPLETE W TRANSVAGINAL AND DOPPLER LIMITED  LOCATION: [facility]  DATE: 9/22/2024     INDICATION: Right sided pelvic pain  COMPARISON: None.  TECHNIQUE: Transabdominal scans were performed. Endovaginal ultrasound was performed to better visualize the adnexa. Color flow with spectral Doppler and waveform analysis performed.     FINDINGS:     UTERUS: 10 x 6 x 4 cm. Normal in size and position with no masses.     ENDOMETRIUM: 2 mm. " Normal smooth endometrium.     RIGHT OVARY: 4.1 x 3.2 x 3.1 cm. Normal with arterial and venous duplex flow identified.     LEFT OVARY: 3.7 x 3.8 x 2.9 cm. Benign physiologic dominant 2.0 cm left ovarian follicle requires no follow-up. Normal arterial and venous duplex flow identified.     Trace amount of free simple anechoic physiologic fluid in pelvic cul-de-sac.                                                                      IMPRESSION:    1.  Normal pelvic ultrasound.  2.  No evidence of ovarian torsion.         EXAM: CT ABDOMEN PELVIS W CONTRAST  LOCATION: [facility]  DATE: 9/22/2024     INDICATION: Right lower quadrant abdominal pain.  COMPARISON: None.  TECHNIQUE: CT scan of the abdomen and pelvis was performed following injection of IV contrast. Multiplanar reformats were obtained. Dose reduction techniques were used.  CONTRAST: 89mL Isovue 370     FINDINGS:   LOWER CHEST: Partial visualized prosthetic tricuspid valve. Partially visualized epicardial pacemaker and leads. No focal airspace disease.     HEPATOBILIARY: Nutmeg appearance of the liver.     PANCREAS: Normal.     SPLEEN: Normal.     ADRENAL GLANDS: Normal.     KIDNEYS/BLADDER: Left renal simple cysts; no follow-up indicated. No hydronephrosis. Urinary bladder appears normal.     BOWEL: No obstruction or inflammatory change. Normal appendix. No evidence of acute appendicitis.     LYMPH NODES: No lymphadenopathy.     VASCULATURE: Unremarkable.     PELVIC ORGANS: Left ovarian simple cyst measuring 1.7 cm. Uterus and right ovary appear unremarkable. Trace pelvic free fluid, within physiologic limits.     MUSCULOSKELETAL: Unremarkable.                                                                      IMPRESSION:   1.  No acute findings or inflammatory changes in the abdomen or pelvis. No acute appendicitis.     2.  Nutmeg appearance of the liver, likely due to hepatic venous congestion and/or congestive heart  failure.                Signed Electronically by: Alisha Neal, TRACEY, APRN, CNP

## 2025-02-28 NOTE — RESULT ENCOUNTER NOTE
Nick Vega,    Your blood count still shows some pretty severe anemia, which would be contributing to the fatigue. Please follow-up with the OBGYN to follow-up on if it is related to the chronic blood loss from menses. Please also start the iron supplement. I'd recommend repeating your labs again 2-3 months.    Let me know if you have any questions or concerns,   Alisha Neal, DNP, APRN, CNP

## 2025-03-06 NOTE — RESULT ENCOUNTER NOTE
Nick Vega,    Again, your iron tests show anemia with low iron so please start taking the iron supplement. I'd recommend repeating labs in about 3 months. Thyroid hormone is normal.    Let me know if you have any questions or concerns,   Alisha Neal, DNP, APRN, CNP

## 2025-04-02 ENCOUNTER — HOSPITAL ENCOUNTER (OUTPATIENT)
Dept: ULTRASOUND IMAGING | Facility: CLINIC | Age: 30
Discharge: HOME | End: 2025-04-02
Payer: COMMERCIAL

## 2025-04-02 DIAGNOSIS — D50.0 IRON DEFICIENCY ANEMIA DUE TO CHRONIC BLOOD LOSS: ICD-10-CM

## 2025-04-02 PROCEDURE — 76856 US EXAM PELVIC COMPLETE: CPT

## 2025-04-02 PROCEDURE — 76830 TRANSVAGINAL US NON-OB: CPT

## 2025-04-03 ENCOUNTER — TELEPHONE (OUTPATIENT)
Dept: CARDIOLOGY | Facility: CLINIC | Age: 30
End: 2025-04-03

## 2025-04-03 ENCOUNTER — OFFICE VISIT (OUTPATIENT)
Dept: CARDIOLOGY | Facility: CLINIC | Age: 30
End: 2025-04-03
Attending: OBSTETRICS & GYNECOLOGY

## 2025-04-03 VITALS
BODY MASS INDEX: 26.51 KG/M2 | OXYGEN SATURATION: 98 % | DIASTOLIC BLOOD PRESSURE: 69 MMHG | SYSTOLIC BLOOD PRESSURE: 110 MMHG | WEIGHT: 179 LBS | HEIGHT: 69 IN | HEART RATE: 69 BPM

## 2025-04-03 DIAGNOSIS — N92.6 IRREGULAR MENSTRUAL BLEEDING: ICD-10-CM

## 2025-04-03 DIAGNOSIS — I48.92 ATRIAL FLUTTER, UNSPECIFIED TYPE (H): ICD-10-CM

## 2025-04-03 DIAGNOSIS — Z95.0 CARDIAC PACEMAKER IN SITU: Primary | ICD-10-CM

## 2025-04-03 DIAGNOSIS — Z95.2 HISTORY OF TRICUSPID VALVE REPLACEMENT WITH MECHANICAL VALVE: ICD-10-CM

## 2025-04-03 NOTE — TELEPHONE ENCOUNTER
Pt overdue for device follow up. Per Dr. Diego, to bee seen with in 2 weeks. Open appts on 4/17/25 at 0900 and 0945. Called pt and LM with dates and times. Also included that at home monitor is not connecting and to call [employer] at [phone] for trouble shooting and possibility of new monitor or parts. Left [facility] RN callback number: [phone]   to return call to schedule in clinic. DAXA Herrera

## 2025-04-03 NOTE — LETTER
4/3/2025    Physician No Ref-Primary  No address on file    RE: Silvia Hustonla       Dear Colleague,     I had the pleasure of seeing Silvia Otero in the [facility].  CARDIOLOGY CLINIC CONSULTATION    PRIMARY CARE PHYSICIAN:  Physician Kathy Ref-Primary    Tests reviewed/interpreted independently in clinic today:   EKG, Echocardiogram, Blood work.     The level of medical decision making during this visit was of moderate complexity.     HISTORY OF PRESENT ILLNESS:  Today, I had the pleasure of connecting with Silvia Otero.  She is a very pleasant 29-year-old lady who presents to the clinic in for follow-up visit.  .  She has a history of atrial myxoma involving the tricuspid valve which she required tricuspid valve replacement early in life.  Since then she has had 2 more tricuspid valve replacements the last one being in 2023.  A 29 mm bioprosthetic valve was used at the time.  Prior to that the patient had been on warfarin because of mechanical prosthesis but she is currently only on aspirin.  After the initial surgery in 1997 she developed complete heart block and an epicardial pacemaker was placed.  The generator is in the epigastric region.  She then had generator replacement for end-of-life.  At some point she was noted to be in atrial flutter but it was decided to monitor her and consider ablation in case this recurs.  Device interrogations have not been very helpful in determining atrial rhythm because of atrial being in the far field and absence of atrial lead.  She is 100% V paced [right bundle].  She has not had her pacemaker interrogated recently.    As far as symptoms are concerned she occasionally feels sharp chest pain at the site of surgery but otherwise feels well.  She takes Tylenol for these chest pain episodes which leads to resolution.  Does not report any difficulty walking or exerting herself.  Last echocardiogram showed well-seated, normally functioning tricuspid valve  with no hemodynamically significant paravalvular leak.  LV/RV function remains normal.    She had an episode of menorrhagia but is otherwise stable.  Hormonal contraceptiveis being considered for that reason and I have been asked to comment on this.  Menorrhagia has now subsided.  She has anemia for which she is taking iron pills.    ASSESSMENT: Pertinent issues addressed/ reviewed during this cardiology visit    Status post TVR x 3-29 mm bioprosthetic valve implanted 2023-mean gradient 7 mmHg.  No significant PVL.    Complete heart block after initial TAVR in 1997 s/p implantation of single-chamber epicardial pacemaker with recent generator change-she is 100% V paced and has right bundle associated with pacing.      History of atrial flutter-appears in sinus rhythm today.  Not on anticoagulation due to low [id number] score    Menorrhagia-given history of valve replacement and the patient is high risk for thrombosis if hormonal contraceptives used.  This is even with the use of bioprosthesis.  My recommendation would be to avoid hormonal contraceptive unless absolutely necessary.  I discussed this with the patient.    Regular follow up in 12 months or sooner if needed.    Siomn BILLY, FACC, FASE  Cardiology - [facility]  April 17, 2024    No orders of the defined types were placed in this encounter.      PAST MEDICAL HISTORY:  Past Medical History:   Diagnosis Date     Cardiac pacemaker in situ      Complete heart block (H)      History of atrial myxoma, congenital      Metal foreign body in chest 07/2023    Temporary cardiac pacing wire cut and left in situ     Tricuspid regurgitation        MEDICATIONS:  Current Outpatient Medications   Medication Sig Dispense Refill     ferrous sulfate (FEROSUL) 325 (65 Fe) MG tablet Take 1 tablet (325 mg) by mouth daily (with breakfast). 90 tablet 3     No current facility-administered medications for this visit.       ALLERGIES:  Allergies   Allergen Reactions      Shellfish-Derived Products Anaphylaxis and Swelling     Lobster,crab     Banana Itching       SOCIAL HISTORY:  I have reviewed this patient's social history and updated it with pertinent information if needed. Silvia Otero  reports that she has never smoked. She has never used smokeless tobacco. She reports that she does not drink alcohol and does not use drugs.    FAMILY HISTORY:  I have reviewed this patient's family history and updated it with pertinent information if needed.   Family History   Problem Relation Age of Onset     No Known Problems Mother      No Known Problems Father      Anesthesia Reaction No family hx of      Deep Vein Thrombosis (DVT) No family hx of        REVIEW OF SYSTEMS:  Skin:        Eyes:       ENT:       Respiratory:       Cardiovascular:       Gastroenterology:      Genitourinary:       Musculoskeletal:       Neurologic:       Psychiatric:       Heme/Lymph/Imm:       Endocrine:           PHYSICAL EXAM:      BP: 110/69 Pulse: 69     SpO2: 98 %      Vital Signs with Ranges  Pulse:  [69] 69  BP: (110)/(69) 110/69  SpO2:  [98 %] 98 %  179 lbs 0 oz    Constitutional: alert, no distress  Respiratory: Good bilateral air entry  Cardiovascular: s1 s2 normal, no murmurs  GI: nondistended  Neuropsychiatric: appropriate affact      This note was completed in part using dictation via the Dragon voice recognition software. Some word and grammatical errors may occur and must be interpreted in the appropriate clinical context.  If there are any questions pertaining to this issue, please contact me for further clarification.      Thank you for allowing me to participate in the care of your patient.      Sincerely,     Simon Diego MD     [facility]  cc:   [place]  [address] [address] [place]  [place] [id number]

## 2025-04-03 NOTE — PROGRESS NOTES
CARDIOLOGY CLINIC CONSULTATION    PRIMARY CARE PHYSICIAN:  Physician No Ref-Primary    Tests reviewed/interpreted independently in clinic today:   EKG, Echocardiogram, Blood work.     The level of medical decision making during this visit was of moderate complexity.     HISTORY OF PRESENT ILLNESS:  Today, I had the pleasure of connecting with Silvia Otero.  She is a very pleasant 29-year-old lady who presents to the clinic in for follow-up visit.  .  She has a history of atrial myxoma involving the tricuspid valve which she required tricuspid valve replacement early in life.  Since then she has had 2 more tricuspid valve replacements the last one being in 2023.  A 29 mm bioprosthetic valve was used at the time.  Prior to that the patient had been on warfarin because of mechanical prosthesis but she is currently only on aspirin.  After the initial surgery in 1997 she developed complete heart block and an epicardial pacemaker was placed.  The generator is in the epigastric region.  She then had generator replacement for end-of-life.  At some point she was noted to be in atrial flutter but it was decided to monitor her and consider ablation in case this recurs.  Device interrogations have not been very helpful in determining atrial rhythm because of atrial being in the far field and absence of atrial lead.  She is 100% V paced [right bundle].  She has not had her pacemaker interrogated recently.    As far as symptoms are concerned she occasionally feels sharp chest pain at the site of surgery but otherwise feels well.  She takes Tylenol for these chest pain episodes which leads to resolution.  Does not report any difficulty walking or exerting herself.  Last echocardiogram showed well-seated, normally functioning tricuspid valve with no hemodynamically significant paravalvular leak.  LV/RV function remains normal.    She had an episode of menorrhagia but is otherwise stable.  Hormonal contraceptiveis being  considered for that reason and I have been asked to comment on this.  Menorrhagia has now subsided.  She has anemia for which she is taking iron pills.    ASSESSMENT: Pertinent issues addressed/ reviewed during this cardiology visit    Status post TVR x 3-29 mm bioprosthetic valve implanted 2023-mean gradient 7 mmHg.  No significant PVL.    Complete heart block after initial TAVR in 1997 s/p implantation of single-chamber epicardial pacemaker with recent generator change-she is 100% V paced and has right bundle associated with pacing.      History of atrial flutter-appears in sinus rhythm today.  Not on anticoagulation due to low [id number] score    Menorrhagia-given history of valve replacement and the patient is high risk for thrombosis if hormonal contraceptives used.  This is even with the use of bioprosthesis.  My recommendation would be to avoid hormonal contraceptive unless absolutely necessary.  I discussed this with the patient.    Regular follow up in 12 months or sooner if needed.    Simon IBLLY, FACC, On license of [facility]  Cardiology - [facility]  April 17, 2024    No orders of the defined types were placed in this encounter.      PAST MEDICAL HISTORY:  Past Medical History:   Diagnosis Date    Cardiac pacemaker in situ     Complete heart block (H)     History of atrial myxoma, congenital     Metal foreign body in chest 07/2023    Temporary cardiac pacing wire cut and left in situ    Tricuspid regurgitation        MEDICATIONS:  Current Outpatient Medications   Medication Sig Dispense Refill    ferrous sulfate (FEROSUL) 325 (65 Fe) MG tablet Take 1 tablet (325 mg) by mouth daily (with breakfast). 90 tablet 3     No current facility-administered medications for this visit.       ALLERGIES:  Allergies   Allergen Reactions    Shellfish-Derived Products Anaphylaxis and Swelling     Lobster,crab    Banana Itching       SOCIAL HISTORY:  I have reviewed this patient's social history and updated it with pertinent  information if needed. Silvia Otero  reports that she has never smoked. She has never used smokeless tobacco. She reports that she does not drink alcohol and does not use drugs.    FAMILY HISTORY:  I have reviewed this patient's family history and updated it with pertinent information if needed.   Family History   Problem Relation Age of Onset    No Known Problems Mother     No Known Problems Father     Anesthesia Reaction No family hx of     Deep Vein Thrombosis (DVT) No family hx of        REVIEW OF SYSTEMS:  Skin:        Eyes:       ENT:       Respiratory:       Cardiovascular:       Gastroenterology:      Genitourinary:       Musculoskeletal:       Neurologic:       Psychiatric:       Heme/Lymph/Imm:       Endocrine:           PHYSICAL EXAM:      BP: 110/69 Pulse: 69     SpO2: 98 %      Vital Signs with Ranges  Pulse:  [69] 69  BP: (110)/(69) 110/69  SpO2:  [98 %] 98 %  179 lbs 0 oz    Constitutional: alert, no distress  Respiratory: Good bilateral air entry  Cardiovascular: s1 s2 normal, no murmurs  GI: nondistended  Neuropsychiatric: appropriate affact      This note was completed in part using dictation via the Dragon voice recognition software. Some word and grammatical errors may occur and must be interpreted in the appropriate clinical context.  If there are any questions pertaining to this issue, please contact me for further clarification.

## 2025-04-03 NOTE — TELEPHONE ENCOUNTER
*pt returned call. She will call [facility] for at home monitor and appt made for 4/17/25 at 0900 for in clinic device check. DAXA Cool

## 2025-07-12 NOTE — PROGRESS NOTES
Patient arrives from [facility] for an unwitnessed fall. Not on thinners. Patient is alert x2 which is her baseline.  No complaints of pain, but she does have a laceration above her right eye.    Patient new to [facility]    INR indication: [place]ural thrombus of heart   History of tricuspid valve replacement with mechanical valve  Atrial flutter   unspecified type (H)     Goal 3.5-4.0    Patient establishing care with Dr. America Christianson at [facility] on 3/10 at 10 am. Referral pended and sent to Dr. Christianson for signature.     [facility] INR team to manage--emergency dosing until referral signed    Patient has INR scheduled at [facility] tomorrow, 3/3 at 12:00pm    Writer spoke with Jacqui Auguste care coordinator involved in patient care. Patient recently migrated from [place] last year. Has not established care in the [place] It is unclear how patient has been managing anticoagulation as  was not aware she was on an anticoagulant.   Patient needs education and support from INR team to remain successful on anticoagulant.    Patient needs New Patient packet sent to home.    Jacqui care coordinator number for follow up questions: [phone]. Kalyani off tomorrow, 3/3. Kalyani's colleagues will be able to answer any additional questions.     Thanks! Jennifer Adame RN

## (undated) DEVICE — PACK GOWN 3/PK DISP XL SBA32GPFCB

## (undated) DEVICE — DRAIN CHEST TUBE 36FR STR 8036

## (undated) DEVICE — TIES BANDING T50R

## (undated) DEVICE — SU ETHIBOND 2-0 V-7DA 10X30" 10X72

## (undated) DEVICE — SU PLEDGET SOFT TFE 3/8"X3/26"X1/16" PCP40

## (undated) DEVICE — SU SILK 0 TIE 6X30" A306H

## (undated) DEVICE — ESU PENCIL SMOKE EVAC W/ROCKER SWITCH 0703-047-000

## (undated) DEVICE — SUCTION TIP YANKAUER STR K87

## (undated) DEVICE — DRAPE IOBAN INCISE 23X17" 6650EZ

## (undated) DEVICE — LINEN TOWEL PACK X30 5481

## (undated) DEVICE — SUCTION DRY CHEST DRAIN OASIS 3600-100

## (undated) DEVICE — LEAD PACER MYOCARDIAL BIPOLAR TEMPORARY 53CM 6495F

## (undated) DEVICE — TUBING SUCTION DRAINAGE PLEURAL DUAL 8884714200

## (undated) DEVICE — SU STEEL 6 CCS 4X18" M654G

## (undated) DEVICE — SOL NACL 0.9% IRRIG 1000ML BOTTLE 2F7124

## (undated) DEVICE — SU VICRYL 0 CTX 36" J370H

## (undated) DEVICE — DRSG DRAIN 4X4" 7086

## (undated) DEVICE — SURGICEL HEMOSTAT 4X8" 1952

## (undated) DEVICE — DECANTER BAG 2002S

## (undated) DEVICE — DEFIB PRO-PADZ LVP LQD GEL ADULT 8900-2105-01

## (undated) DEVICE — ESU HOLSTER PLASTIC DISP E2400

## (undated) DEVICE — SU ETHIBOND 0 CT-1 CR 8X18" CX21D

## (undated) DEVICE — PROTECTOR ARM ONE-STEP TRENDELENBURG 40418

## (undated) DEVICE — WIPES FOLEY CARE SURESTEP PROVON DFC100

## (undated) DEVICE — SU ETHIBOND 2-0 SHDA 30" X563H

## (undated) DEVICE — BLADE CLIPPER SGL USE 9680

## (undated) DEVICE — DRSG TELFA 3X8" 1238

## (undated) DEVICE — SU PROLENE 5-0 RB-1DA 36"  8556H

## (undated) DEVICE — Device

## (undated) DEVICE — SU PROLENE 4-0 RB-1DA 36" 8557H

## (undated) DEVICE — SU STEEL MYO/WIRE II STERNOTOMY 8 BE-1 3X14" 048-217

## (undated) DEVICE — BASIN EMESIS STERILE  SSK9005A

## (undated) DEVICE — SUCTION CATH AIRLIFE TRI-FLO W/CONTROL PORT 14FR  T60C

## (undated) DEVICE — PREP CHLORAPREP 26ML TINTED HI-LITE ORANGE 930815

## (undated) DEVICE — SU DEVICE ENDO COR KNOT QUICK LOAD 030850

## (undated) DEVICE — DEVICE TISSUE STABILIZATION OCTOBASE 28707

## (undated) DEVICE — SPONGE RAY-TEC 4X8" 7318

## (undated) DEVICE — SU PROLENE 4-0 SHDA 36" 8521H

## (undated) DEVICE — TUBING INSUFFLATION PNEUMOCLEAR 0620050100

## (undated) DEVICE — SU PROLENE 5-0 RB-2DA 30" 8710H

## (undated) DEVICE — SU DEVICE COR-KNOT MINI 4X14MM 031350

## (undated) DEVICE — SU DERMABOND ADVANCED .7ML DNX12

## (undated) DEVICE — SU VICRYL 2-0 CT-1 27" UND J259H

## (undated) DEVICE — BLADE ESU PLASMABLADE SPATULA TIP 4MM PS200-040

## (undated) DEVICE — SU ETHIBOND 3-0 BBDA 36" X588H

## (undated) DEVICE — TAPE MEDIPORE 4"X2YD 2864

## (undated) DEVICE — DRSG MEDIPORE 3 1/2X13 3/4" 3573

## (undated) DEVICE — LINEN TOWEL PACK X6 WHITE 5487

## (undated) DEVICE — DRAPE FLUID WARMING 52 X 60" ORS-321

## (undated) DEVICE — DRAIN CHEST TUBE RIGHT ANGLED 28FR 8128

## (undated) DEVICE — SOL NACL 0.9% 10ML VIAL 0409-4888-02

## (undated) DEVICE — PACK ADULT HEART UMMC PV15CG92D

## (undated) DEVICE — SU VICRYL 0 CT-1 27" UND J260H

## (undated) DEVICE — DRSG ABDOMINAL 07 1/2X8" 7197D

## (undated) DEVICE — TOURNIQUET VASCULAR KIT 7 1/2" 79012

## (undated) DEVICE — SOL NACL 0.9% IRRIG 3000ML BAG 2B7477

## (undated) DEVICE — ESU ELEC BLADE E-SEP INSULATED NEPTUNE 70MM 0703-070-002

## (undated) DEVICE — GLOVE BIOGEL PI SZ 7.0 40870

## (undated) DEVICE — NDL COUNTER 40CT  31142311

## (undated) DEVICE — BLADE SAW OFFSET FAN STRK 30X30X0.38MM 5400-134-279

## (undated) DEVICE — SU VICRYL 3-0 FS-1 27" J442H

## (undated) DEVICE — SUCTION MANIFOLD NEPTUNE 2 SYS 4 PORT 0702-020-000

## (undated) DEVICE — BLADE KNIFE SURG 11 371111

## (undated) RX ORDER — FENTANYL CITRATE 50 UG/ML
INJECTION, SOLUTION INTRAMUSCULAR; INTRAVENOUS
Status: DISPENSED
Start: 2023-02-24

## (undated) RX ORDER — ACETAMINOPHEN 325 MG/1
TABLET ORAL
Status: DISPENSED
Start: 2023-07-20

## (undated) RX ORDER — CEFAZOLIN SODIUM/WATER 2 G/20 ML
SYRINGE (ML) INTRAVENOUS
Status: DISPENSED
Start: 2023-07-20

## (undated) RX ORDER — FENTANYL CITRATE 50 UG/ML
INJECTION, SOLUTION INTRAMUSCULAR; INTRAVENOUS
Status: DISPENSED
Start: 2023-07-20

## (undated) RX ORDER — HEPARIN SODIUM 1000 [USP'U]/ML
INJECTION, SOLUTION INTRAVENOUS; SUBCUTANEOUS
Status: DISPENSED
Start: 2023-07-20

## (undated) RX ORDER — GABAPENTIN 300 MG/1
CAPSULE ORAL
Status: DISPENSED
Start: 2023-07-20

## (undated) RX ORDER — FLUMAZENIL 0.1 MG/ML
INJECTION, SOLUTION INTRAVENOUS
Status: DISPENSED
Start: 2023-02-24

## (undated) RX ORDER — PROPOFOL 10 MG/ML
INJECTION, EMULSION INTRAVENOUS
Status: DISPENSED
Start: 2023-07-20

## (undated) RX ORDER — CEFAZOLIN SODIUM 1 G/3ML
INJECTION, POWDER, FOR SOLUTION INTRAMUSCULAR; INTRAVENOUS
Status: DISPENSED
Start: 2023-07-20

## (undated) RX ORDER — BUPIVACAINE HYDROCHLORIDE 2.5 MG/ML
INJECTION, SOLUTION EPIDURAL; INFILTRATION; INTRACAUDAL
Status: DISPENSED
Start: 2023-02-24

## (undated) RX ORDER — LIDOCAINE HYDROCHLORIDE 10 MG/ML
INJECTION, SOLUTION EPIDURAL; INFILTRATION; INTRACAUDAL; PERINEURAL
Status: DISPENSED
Start: 2023-02-24

## (undated) RX ORDER — CEFAZOLIN SODIUM 2 G/100ML
INJECTION, SOLUTION INTRAVENOUS
Status: DISPENSED
Start: 2023-02-24

## (undated) RX ORDER — FAMOTIDINE 20 MG/1
TABLET, FILM COATED ORAL
Status: DISPENSED
Start: 2023-07-20

## (undated) RX ORDER — LIDOCAINE HYDROCHLORIDE 40 MG/ML
SOLUTION TOPICAL
Status: DISPENSED
Start: 2023-02-24

## (undated) RX ORDER — KETOROLAC TROMETHAMINE 30 MG/ML
INJECTION, SOLUTION INTRAMUSCULAR; INTRAVENOUS
Status: DISPENSED
Start: 2023-02-24

## (undated) RX ORDER — NALOXONE HYDROCHLORIDE 0.4 MG/ML
INJECTION, SOLUTION INTRAMUSCULAR; INTRAVENOUS; SUBCUTANEOUS
Status: DISPENSED
Start: 2023-02-24

## (undated) RX ORDER — ASPIRIN 81 MG/1
TABLET ORAL
Status: DISPENSED
Start: 2023-07-20

## (undated) RX ORDER — HYDROMORPHONE HYDROCHLORIDE 1 MG/ML
INJECTION, SOLUTION INTRAMUSCULAR; INTRAVENOUS; SUBCUTANEOUS
Status: DISPENSED
Start: 2023-07-20

## (undated) RX ORDER — GLYCOPYRROLATE 0.2 MG/ML
INJECTION, SOLUTION INTRAMUSCULAR; INTRAVENOUS
Status: DISPENSED
Start: 2023-02-24

## (undated) RX ORDER — HEPARIN SODIUM,PORCINE 10 UNIT/ML
VIAL (ML) INTRAVENOUS
Status: DISPENSED
Start: 2023-07-20